# Patient Record
Sex: MALE | Race: WHITE | NOT HISPANIC OR LATINO | URBAN - METROPOLITAN AREA
[De-identification: names, ages, dates, MRNs, and addresses within clinical notes are randomized per-mention and may not be internally consistent; named-entity substitution may affect disease eponyms.]

---

## 2020-06-29 PROBLEM — Z00.00 ENCOUNTER FOR PREVENTIVE HEALTH EXAMINATION: Status: ACTIVE | Noted: 2020-06-29

## 2023-12-22 ENCOUNTER — INPATIENT (INPATIENT)
Facility: HOSPITAL | Age: 58
LOS: 0 days | Discharge: HOME | DRG: 72 | End: 2023-12-23
Attending: STUDENT IN AN ORGANIZED HEALTH CARE EDUCATION/TRAINING PROGRAM | Admitting: STUDENT IN AN ORGANIZED HEALTH CARE EDUCATION/TRAINING PROGRAM
Payer: COMMERCIAL

## 2023-12-22 VITALS
HEART RATE: 85 BPM | DIASTOLIC BLOOD PRESSURE: 87 MMHG | WEIGHT: 186.95 LBS | HEIGHT: 67 IN | OXYGEN SATURATION: 99 % | SYSTOLIC BLOOD PRESSURE: 130 MMHG | TEMPERATURE: 98 F | RESPIRATION RATE: 16 BRPM

## 2023-12-22 DIAGNOSIS — Z98.890 OTHER SPECIFIED POSTPROCEDURAL STATES: Chronic | ICD-10-CM

## 2023-12-22 DIAGNOSIS — G93.89 OTHER SPECIFIED DISORDERS OF BRAIN: ICD-10-CM

## 2023-12-22 LAB
APTT BLD: 28.2 SEC — SIGNIFICANT CHANGE UP (ref 24.5–35.6)
APTT BLD: 28.2 SEC — SIGNIFICANT CHANGE UP (ref 24.5–35.6)
HCT VFR BLD CALC: 46.8 % — SIGNIFICANT CHANGE UP (ref 39–50)
HCT VFR BLD CALC: 46.8 % — SIGNIFICANT CHANGE UP (ref 39–50)
HGB BLD-MCNC: 16.5 G/DL — SIGNIFICANT CHANGE UP (ref 13–17)
HGB BLD-MCNC: 16.5 G/DL — SIGNIFICANT CHANGE UP (ref 13–17)
INR BLD: 0.98 RATIO — SIGNIFICANT CHANGE UP (ref 0.85–1.18)
INR BLD: 0.98 RATIO — SIGNIFICANT CHANGE UP (ref 0.85–1.18)
MAGNESIUM SERPL-MCNC: 2 MG/DL — SIGNIFICANT CHANGE UP (ref 1.8–2.6)
MAGNESIUM SERPL-MCNC: 2 MG/DL — SIGNIFICANT CHANGE UP (ref 1.8–2.6)
MCHC RBC-ENTMCNC: 29.7 PG — SIGNIFICANT CHANGE UP (ref 27–34)
MCHC RBC-ENTMCNC: 29.7 PG — SIGNIFICANT CHANGE UP (ref 27–34)
MCHC RBC-ENTMCNC: 35.3 GM/DL — SIGNIFICANT CHANGE UP (ref 32–36)
MCHC RBC-ENTMCNC: 35.3 GM/DL — SIGNIFICANT CHANGE UP (ref 32–36)
MCV RBC AUTO: 84.3 FL — SIGNIFICANT CHANGE UP (ref 80–100)
MCV RBC AUTO: 84.3 FL — SIGNIFICANT CHANGE UP (ref 80–100)
PHOSPHATE SERPL-MCNC: 2.9 MG/DL — SIGNIFICANT CHANGE UP (ref 2.4–4.7)
PHOSPHATE SERPL-MCNC: 2.9 MG/DL — SIGNIFICANT CHANGE UP (ref 2.4–4.7)
PLATELET # BLD AUTO: 371 K/UL — SIGNIFICANT CHANGE UP (ref 150–400)
PLATELET # BLD AUTO: 371 K/UL — SIGNIFICANT CHANGE UP (ref 150–400)
PROTHROM AB SERPL-ACNC: 10.9 SEC — SIGNIFICANT CHANGE UP (ref 9.5–13)
PROTHROM AB SERPL-ACNC: 10.9 SEC — SIGNIFICANT CHANGE UP (ref 9.5–13)
RBC # BLD: 5.55 M/UL — SIGNIFICANT CHANGE UP (ref 4.2–5.8)
RBC # BLD: 5.55 M/UL — SIGNIFICANT CHANGE UP (ref 4.2–5.8)
RBC # FLD: 12.5 % — SIGNIFICANT CHANGE UP (ref 10.3–14.5)
RBC # FLD: 12.5 % — SIGNIFICANT CHANGE UP (ref 10.3–14.5)
WBC # BLD: 5.71 K/UL — SIGNIFICANT CHANGE UP (ref 3.8–10.5)
WBC # BLD: 5.71 K/UL — SIGNIFICANT CHANGE UP (ref 3.8–10.5)
WBC # FLD AUTO: 5.71 K/UL — SIGNIFICANT CHANGE UP (ref 3.8–10.5)
WBC # FLD AUTO: 5.71 K/UL — SIGNIFICANT CHANGE UP (ref 3.8–10.5)

## 2023-12-22 PROCEDURE — 99285 EMERGENCY DEPT VISIT HI MDM: CPT

## 2023-12-22 PROCEDURE — 93010 ELECTROCARDIOGRAM REPORT: CPT

## 2023-12-22 PROCEDURE — 74177 CT ABD & PELVIS W/CONTRAST: CPT | Mod: 26,MA

## 2023-12-22 PROCEDURE — 71260 CT THORAX DX C+: CPT | Mod: 26,MA

## 2023-12-22 PROCEDURE — 70553 MRI BRAIN STEM W/O & W/DYE: CPT | Mod: 26,MA

## 2023-12-22 RX ORDER — ACETAMINOPHEN 500 MG
650 TABLET ORAL EVERY 6 HOURS
Refills: 0 | Status: DISCONTINUED | OUTPATIENT
Start: 2023-12-22 | End: 2023-12-23

## 2023-12-22 RX ORDER — ENOXAPARIN SODIUM 100 MG/ML
40 INJECTION SUBCUTANEOUS EVERY 24 HOURS
Refills: 0 | Status: DISCONTINUED | OUTPATIENT
Start: 2023-12-22 | End: 2023-12-23

## 2023-12-22 RX ORDER — POLYETHYLENE GLYCOL 3350 17 G/17G
17 POWDER, FOR SOLUTION ORAL DAILY
Refills: 0 | Status: DISCONTINUED | OUTPATIENT
Start: 2023-12-22 | End: 2023-12-23

## 2023-12-22 RX ORDER — OXYCODONE HYDROCHLORIDE 5 MG/1
10 TABLET ORAL EVERY 4 HOURS
Refills: 0 | Status: DISCONTINUED | OUTPATIENT
Start: 2023-12-22 | End: 2023-12-23

## 2023-12-22 RX ORDER — ONDANSETRON 8 MG/1
4 TABLET, FILM COATED ORAL EVERY 6 HOURS
Refills: 0 | Status: DISCONTINUED | OUTPATIENT
Start: 2023-12-22 | End: 2023-12-23

## 2023-12-22 RX ORDER — LEVETIRACETAM 250 MG/1
500 TABLET, FILM COATED ORAL EVERY 12 HOURS
Refills: 0 | Status: DISCONTINUED | OUTPATIENT
Start: 2023-12-22 | End: 2023-12-23

## 2023-12-22 RX ORDER — INFLUENZA VIRUS VACCINE 15; 15; 15; 15 UG/.5ML; UG/.5ML; UG/.5ML; UG/.5ML
0.5 SUSPENSION INTRAMUSCULAR ONCE
Refills: 0 | Status: DISCONTINUED | OUTPATIENT
Start: 2023-12-22 | End: 2023-12-23

## 2023-12-22 RX ORDER — LABETALOL HCL 100 MG
10 TABLET ORAL
Refills: 0 | Status: DISCONTINUED | OUTPATIENT
Start: 2023-12-22 | End: 2023-12-23

## 2023-12-22 RX ORDER — HYDRALAZINE HCL 50 MG
10 TABLET ORAL
Refills: 0 | Status: DISCONTINUED | OUTPATIENT
Start: 2023-12-22 | End: 2023-12-23

## 2023-12-22 RX ORDER — OXYCODONE HYDROCHLORIDE 5 MG/1
5 TABLET ORAL EVERY 4 HOURS
Refills: 0 | Status: DISCONTINUED | OUTPATIENT
Start: 2023-12-22 | End: 2023-12-23

## 2023-12-22 RX ORDER — SENNA PLUS 8.6 MG/1
2 TABLET ORAL AT BEDTIME
Refills: 0 | Status: DISCONTINUED | OUTPATIENT
Start: 2023-12-22 | End: 2023-12-23

## 2023-12-22 RX ADMIN — ENOXAPARIN SODIUM 40 MILLIGRAM(S): 100 INJECTION SUBCUTANEOUS at 23:29

## 2023-12-22 RX ADMIN — SENNA PLUS 2 TABLET(S): 8.6 TABLET ORAL at 23:29

## 2023-12-22 NOTE — H&P ADULT - NSHPLABSRESULTS_GEN_ALL_CORE
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 Larger zone FLAIR signal involves basal ganglia, insula and   further sites of frontal and temporal lobes.    Likely diagnosis is high-grade glioma; correlate with surgical pathology.    ACC: [id number] EXAM: CT ANGIO BRAIN (W)AW IC  ACC: [id number] EXAM: CT ANGIO NECK (W)AW IC   ACC: [id number] EXAM: CT BRAIN O    PROCEDURE DATE: 12/22/2023    IMPRESSION: Right frontal lesion is suspect described above. Contrast enhanced MRI of brain is recommended for further evaluation.    CTA of the neck demonstrates no significant stenosis. CTA of the [place] demonstrates no evidence of aneurysms or significant stenosis.     CT Abdomen and Pelvis w/ IV Cont (12.22.23 @ 17:27)     IMPRESSION:  1.  No findings to suggest primary malignancy or metastatic disease in   the chest, abdomen or pelvis.    2.  Nonspecific 5 mm left lower lobe pulmonary nodule. Additional   calcified nodules in the left lower lobe lung likely postinfectious or   inflammatory.    3.  Diffuse bladder wall thickening and trabeculation. Findings could be   related to bladder outlet obstruction the setting of prostatomegaly or   cystitis. Correlate with urinalysis. 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 Larger zone FLAIR signal involves basal ganglia, insula and   further sites of frontal and temporal lobes.    Likely diagnosis is high-grade glioma; correlate with surgical pathology.    ACC: [id number] EXAM: CT ANGIO BRAIN (W)AW IC  ACC: [id number] EXAM: CT ANGIO NECK (W)AW IC   ACC: [id number] EXAM: CT BRAIN O    PROCEDURE DATE: 12/22/2023    IMPRESSION: Right frontal lesion is suspect described above. Contrast enhanced MRI of brain is recommended for further evaluation.    CTA of the neck demonstrates no significant stenosis. CTA of the [place] demonstrates no evidence of aneurysms or significant stenosis.     CT Abdomen and Pelvis w/ IV Cont (12.22.23 @ 17:27)     IMPRESSION:  1.  No findings to suggest primary malignancy or metastatic disease in   the chest, abdomen or pelvis.    2.  Nonspecific 5 mm left lower lobe pulmonary nodule. Additional   calcified nodules in the left lower lobe lung likely postinfectious or   inflammatory.    3.  Diffuse bladder wall thickening and trabeculation. Findings could be   related to bladder outlet obstruction the setting of prostatomegaly or   cystitis. Correlate with urinalysis.

## 2023-12-22 NOTE — ED PROVIDER NOTE - PROGRESS NOTE DETAILS
Cory: Patient signed out to me by previous team. Patient evaluated bedside and is comfortable w/o complaints at this time. Vitals stable. Pending MR read and neurosurgery recommendations. Keppra given.

## 2023-12-22 NOTE — ED ADULT TRIAGE NOTE - CHIEF COMPLAINT QUOTE
Pt BIBA transfer from [place] for evaluation of mass in the brain. Pt has been having intermittent and now worsening symptoms for the last 2 months. Pt with periods of confusion, slurred speech, vision changes and facial droop. Pt BIBA transfer from [place] for evaluation of mass in the brain. Pt has been having intermittent and now worsening symptoms for the last 2 months. Pt with periods of confusion, slurred speech, vision changes and facial droop.

## 2023-12-22 NOTE — CONSULT NOTE ADULT - ASSESSMENT
Assessment:  58M with PMH HTN, HLD, melanoma x3 who presented with 1 month of confusion, altered speaking, facial droop, and HA. Patient [place] [facility] [place], [place] showed R frontal lesion x2. Patient transferred to [facility] for further neurosurgical w/u and evaluation.      Plan:  - Discussed with Dr. Ventura  - Q2 hour neuro checks  - SBP goal normotensive  - Keppra 500 BID  - MRI brain w/wo stereotactic  - CT CAP for mets w/u   - No steroids  - Further plan pending imaging  Assessment:  58M with PMH HTN, HLD, melanoma x3 who presented with 1 month of confusion, altered speaking, facial droop, and HA. Patient presented to [facility] [place], CTH showed R frontal lesion x2. Patient transferred to [facility] for further neurosurgical w/u and evaluation.      Plan:  - Discussed with Dr. Ventura  - Q2 hour neuro checks  - SBP goal normotensive  - Keppra 500 BID  - MRI brain w/wo stereotactic  - CT CAP for mets w/u   - No steroids  - Further plan pending imaging

## 2023-12-22 NOTE — PATIENT PROFILE ADULT - FALL HARM RISK - UNIVERSAL INTERVENTIONS
Bed in lowest position, wheels locked, appropriate side rails in place/Call bell, personal items and telephone in reach/Instruct patient to call for assistance before getting out of bed or chair/Non-slip footwear when patient is out of bed/[place] to call system/Physically safe environment - no spills, clutter or unnecessary equipment/Purposeful Proactive Rounding/Room/bathroom lighting operational, light cord in reach Bed in lowest position, wheels locked, appropriate side rails in place/Call bell, personal items and telephone in reach/Instruct patient to call for assistance before getting out of bed or chair/Non-slip footwear when patient is out of bed/[place] to call system/Physically safe environment - no spills, clutter or unnecessary equipment/Purposeful Proactive Rounding/Room/bathroom lighting operational, light cord in reach

## 2023-12-22 NOTE — ED ADULT NURSE REASSESSMENT NOTE - NS ED NURSE REASSESS COMMENT FT1
Assumed care of patient from KANU Bejarano and [patient resting on stretcher quietly, no acute distress noted at this time, family at bedside and pt pending MRI results.

## 2023-12-22 NOTE — CONSULT NOTE ADULT - SUBJECTIVE AND OBJECTIVE BOX
HPI:  58M with PMH HTN, HLD, melanoma x3 last resection 1 year ago never requiring systemic treatment who presented today with being altered, L facial droop, and HA. Per patient, his children have noticed him being "off" for the past 1 month with intermittent confusion and altered speaking while talking with him on the phone. Yesterday, patient flew from [place] to visit family, and his children confirmed altered speaking and facial droop which prompted patient to visit [facility] this am. Patient admits to severe HA 10/10 currently, has tried using extra strength tylenol without relief, and nausea this am now resolved. Patient also admits to worsening vision / blurred vision x1 month. Patient denies weakness, numbness, tingling, difficulty walking, weight loss, CP, SOB, N/V.       PAST MEDICAL & SURGICAL HISTORY:  HTN  HLD  Melanoma x3 last resected 1 year ago, never required systemic treatment      SOCIAL HISTORY:  Tobacco Use: denies  EtOH use: former etoh abuse, quit 1 year ago  Substance: denies       Allergies  No Known Allergies      REVIEW OF SYSTEMS  Negative except as noted in HPI  CONSTITUTIONAL: No fever, weight loss, or fatigue  EYES: + blurred vision. No eye pain, visual disturbances, or discharge  ENMT:  No difficulty hearing, tinnitus, vertigo; No sinus or throat pain  RESPIRATORY: No cough; No shortness of breath  CARDIOVASCULAR: No chest pain  GASTROINTESTINAL: + nausea intermittent, No abdominal pain  NEUROLOGICAL: + headache, No memory loss, loss of strength, numbness, or tremors  MUSCULOSKELETAL: No joint pain or swelling; No muscle, back, or extremity pain      Home medications:  HCTZ   Statin       Vital Signs Last 24 Hrs  T(C): 36.7 (22 Dec 2023 13:36), Max: 36.7 (22 Dec 2023 13:36)  T(F): 98.1 (22 Dec 2023 13:36), Max: 98.1 (22 Dec 2023 13:36)  HR: 85 (22 Dec 2023 13:36) (85 - 85)  BP: 130/87 (22 Dec 2023 13:36) (130/87 - 130/87)  RR: 16 (22 Dec 2023 13:36) (16 - 16)  SpO2: 99% (22 Dec 2023 13:36) (99% - 99%)    Parameters below as of 22 Dec 2023 13:36  Patient On (Oxygen Delivery Method): room air      Physical Exam:  Constitutional: NAD, lying in bed  Neuro  * Mental Status:  GCS 15: Awake, alert, oriented to conversation. No aphasia or difficulty speaking. Mild dysarthria.  * Cranial Nerves: PERRL, EOMI, tongue midline, no gaze deviation, L facial droop   * Motor: RUE 5/5, LUE 5/5, RLE 5/5, LLE 5/5, no drift or dysmetria  * Sensory: Sensation intact to light touch  * Reflexes: not assessed       LABS:  Pending       RADIOLOGY & ADDITIONAL STUDIES:  12/22/23 CTH:   There is evidence of an abnormal lesion with some peripheral hyperattenuation identified in the right frontal region. This lesion does demonstrate central area of low attenuation and measures approximately 4.6 x 4.2 cm. This is likely compatible with underlying neoplastic process such as underlying glioma though the possibility of underlying metastasis or other similar etiologies cannot entirely excluded. Just adjacent to this area is a second low attenuation seen involving the lateral aspect of the right lenticular nucleus region. This area of low attenuation measures approximately 3.3 x 1.6 cm and could be compatible with a second lesion. Contrast enhanced MRI is recommended to better characterize both these findings.    Mass effect on the right lateral ventricle seen. Right to left shift (7.1 mm).    Evaluation of the osseous appropriate window appears unremarkable    The visualized paranasal sinuses mastoid and middle ear regions appear clear. Both distal common carotid, proximal internal and external carotid arteries appear normal as well as both vertebral arteries. Dominant left vertebral artery seen.    Both distal internal carotid arteries appear normal    Both anterior cerebral arteries appear slightly deviated to the left side though normal flow is seen. Both middle cerebral basilar and posterior cerebral arteries appear normal. The dural venous sinuses demonstrate normal enhancement. Evaluation soft tissue neck region appears normal    The visualized portion both lung apices appear clear.    Degenerative changes involving the cervical spine.    IMPRESSION: Right frontal lesion is suspect described above. Contrast enhanced MRI of brain is recommended for further evaluation.    CTA of the neck demonstrates no significant stenosis. CTA of the [place] of [place] demonstrates no evidence of aneurysms or significant stenosis.    --- End of Report ---  RADHA GARCIA MD; Attending Radiologist  This document has been electronically signed. Dec 22 2023 10:02AM HPI:  58M with PMH HTN, HLD, melanoma x3 last resection 1 year ago never requiring systemic treatment who presented today with being altered, L facial droop, and HA. Per patient, his children have noticed him being "off" for the past 1 month with intermittent confusion and altered speaking while talking with him on the phone. Yesterday, patient flew from [place] to visit family, and his children confirmed altered speaking and facial droop which prompted patient to visit [facility] this am. Patient admits to severe HA 10/10 currently, has tried using extra strength tylenol without relief, and nausea this am now resolved. Patient also admits to worsening vision / blurred vision x1 month. Patient denies weakness, numbness, tingling, difficulty walking, weight loss, CP, SOB, N/V.       PAST MEDICAL & SURGICAL HISTORY:  HTN  HLD  Melanoma x3 last resected 1 year ago, never required systemic treatment      SOCIAL HISTORY:  Tobacco Use: denies  EtOH use: former etoh abuse, quit 1 year ago  Substance: denies       Allergies  No Known Allergies      REVIEW OF SYSTEMS  Negative except as noted in HPI  CONSTITUTIONAL: No fever, weight loss, or fatigue  EYES: + blurred vision. No eye pain, visual disturbances, or discharge  ENMT:  No difficulty hearing, tinnitus, vertigo; No sinus or throat pain  RESPIRATORY: No cough; No shortness of breath  CARDIOVASCULAR: No chest pain  GASTROINTESTINAL: + nausea intermittent, No abdominal pain  NEUROLOGICAL: + headache, No memory loss, loss of strength, numbness, or tremors  MUSCULOSKELETAL: No joint pain or swelling; No muscle, back, or extremity pain      Home medications:  HCTZ   Statin       Vital Signs Last 24 Hrs  T(C): 36.7 (22 Dec 2023 13:36), Max: 36.7 (22 Dec 2023 13:36)  T(F): 98.1 (22 Dec 2023 13:36), Max: 98.1 (22 Dec 2023 13:36)  HR: 85 (22 Dec 2023 13:36) (85 - 85)  BP: 130/87 (22 Dec 2023 13:36) (130/87 - 130/87)  RR: 16 (22 Dec 2023 13:36) (16 - 16)  SpO2: 99% (22 Dec 2023 13:36) (99% - 99%)    Parameters below as of 22 Dec 2023 13:36  Patient On (Oxygen Delivery Method): room air      Physical Exam:  Constitutional: NAD, lying in bed  Neuro  * Mental Status:  GCS 15: Awake, alert, oriented to conversation. No aphasia or difficulty speaking. Mild dysarthria.  * Cranial Nerves: PERRL, EOMI, tongue midline, no gaze deviation, L facial droop   * Motor: RUE 5/5, LUE 5/5, RLE 5/5, LLE 5/5, no drift or dysmetria  * Sensory: Sensation intact to light touch  * Reflexes: not assessed       LABS:  Pending       RADIOLOGY & ADDITIONAL STUDIES:  12/22/23 CTH:   There is evidence of an abnormal lesion with some peripheral hyperattenuation identified in the right frontal region. This lesion does demonstrate central area of low attenuation and measures approximately 4.6 x 4.2 cm. This is likely compatible with underlying neoplastic process such as underlying glioma though the possibility of underlying metastasis or other similar etiologies cannot entirely excluded. Just adjacent to this area is a second low attenuation seen involving the lateral aspect of the right lenticular nucleus region. This area of low attenuation measures approximately 3.3 x 1.6 cm and could be compatible with a second lesion. Contrast enhanced MRI is recommended to better characterize both these findings.    Mass effect on the right lateral ventricle seen. Right to left shift (7.1 mm).    Evaluation of the osseous appropriate window appears unremarkable    The visualized paranasal sinuses mastoid and middle ear regions appear clear. Both distal common carotid, proximal internal and external carotid arteries appear normal as well as both vertebral arteries. Dominant left vertebral artery seen.    Both distal internal carotid arteries appear normal    Both anterior cerebral arteries appear slightly deviated to the left side though normal flow is seen. Both middle cerebral basilar and posterior cerebral arteries appear normal. The dural venous sinuses demonstrate normal enhancement. Evaluation soft tissue neck region appears normal    The visualized portion both lung apices appear clear.    Degenerative changes involving the cervical spine.    IMPRESSION: Right frontal lesion is suspect described above. Contrast enhanced MRI of brain is recommended for further evaluation.    CTA of the neck demonstrates no significant stenosis. CTA of the [place] demonstrates no evidence of aneurysms or significant stenosis.    --- End of Report ---  RADHA GARCIA MD; Attending Radiologist  This document has been electronically signed. Dec 22 2023 10:02AM

## 2023-12-22 NOTE — ED PROVIDER NOTE - OBJECTIVE STATEMENT
Pt is a 59 yo M transferred [place] [facility] [place].   Pt has had 2 wk hx of L facial droop, frontal headaches and forgetfulness.  Pt is in town visiting family for the holidays and was taken to [facility] [place] and found to have a brain mass and was brought here for further evaluation. no n/v. no cp. no sob. no other complaints. Pt is a 59 yo M transferred from [facility] [place] for brain mass.   Pt has had 2 wk hx of L facial droop, frontal headaches and forgetfulness.  Pt is in town visiting family for the holidays and was taken to [facility] [place] and found to have a brain mass and was brought here for further evaluation. no n/v. no cp. no sob. no other complaints.

## 2023-12-22 NOTE — ED ADULT NURSE NOTE - NSFALLUNIVINTERV_ED_ALL_ED
Bed/Stretcher in lowest position, wheels locked, appropriate side rails in place/Call bell, personal items and telephone in reach/Instruct patient to call for assistance before getting out of bed/chair/stretcher/Non-slip footwear applied when patient is off stretcher/[place] to call system/Physically safe environment - no spills, clutter or unnecessary equipment/Purposeful proactive rounding/Room/bathroom lighting operational, light cord in reach Bed/Stretcher in lowest position, wheels locked, appropriate side rails in place/Call bell, personal items and telephone in reach/Instruct patient to call for assistance before getting out of bed/chair/stretcher/Non-slip footwear applied when patient is off stretcher/[place] to call system/Physically safe environment - no spills, clutter or unnecessary equipment/Purposeful proactive rounding/Room/bathroom lighting operational, light cord in reach

## 2023-12-22 NOTE — H&P ADULT - HISTORY OF PRESENT ILLNESS
58M with PMHx of HTN, HLD, melanoma x3 last resection 1 year ago never requiring systemic treatment who presented today after being altered, L facial droop, and HA. Per patient, his children have noticed him being "off" for the past 1 month with intermittent confusion and altered speech while talking with him on the phone. Yesterday, patient flew from [place] to visit family, and his children confirmed altered speech and facial droop which prompted [place] [facility]. Patient admits to severe HA 10/10 currently, has tried using extra strength tylenol without relief, and nausea this am now resolved. Patient also admits to worsening vision / blurred vision x1 month. Patient denies weakness, numbness, tingling, difficulty walking, weight loss, CP, SOB, N/V. [place] [facility] [place] 4.6x4.2 cm R frontal mass w/ additional  3.3x1.6cm adjacent lesion. Pt was transferred to [facility] for further neurosurgical evaluation.    58M with PMHx of HTN, HLD, melanoma x3 last resection 1 year ago never requiring systemic treatment who presented today after being altered, L facial droop, and HA. Per patient, his children have noticed him being "off" for the past 1 month with intermittent confusion and altered speech while talking with him on the phone. Yesterday, patient flew from [place] to visit family, and his children confirmed altered speech and facial droop which prompted patient to visit [facility] [place] [place] this am. Patient admits to severe HA 10/10 currently, has tried using extra strength tylenol without relief, and nausea this am now resolved. Patient also admits to worsening vision / blurred vision x1 month. Patient denies weakness, numbness, tingling, difficulty walking, weight loss, CP, SOB, N/V. CTH at [facility] [place] [place] showed 4.6x4.2 cm R frontal mass w/ additional  3.3x1.6cm adjacent lesion. Pt was transferred to [facility] for further neurosurgical evaluation.

## 2023-12-22 NOTE — ED PROVIDER NOTE - CLINICAL SUMMARY MEDICAL DECISION MAKING FREE TEXT BOX
[place] [facility] [place] C/A/P neg for malignancy/metastatic disease.  Pt pending MRI and disposition as per neurousrgery. pt signed out to dr. galan. labs and [place] [facility] [place]  [place] C/A/P neg for malignancy/metastatic disease.  Pt pending MRI and disposition as per neurousrgery. pt signed out to dr. galan.

## 2023-12-22 NOTE — ED ADULT NURSE NOTE - CHIEF COMPLAINT QUOTE
Pt BIBA transfer from [place] for evaluation of mass in the brain. Pt has been having intermittent and now worsening symptoms for the last 2 months. Pt with periods of confusion, slurred speech, vision changes and facial droop. Pt BIBA transfer from [place] for evaluation of mass in the brain. Pt has been having intermittent and now worsening symptoms for the last 2 months. Pt with periods of confusion, slurred speech, vision changes and facial droop.

## 2023-12-22 NOTE — H&P ADULT - ASSESSMENT
ASSESSMENT:  58M with PMHx of  HTN, HLD, melanoma x3 who presented with 1 month of confusion, altered speech, facial droop, and HA. [place] [facility] [place] showed  4.6x4.2 cm R frontal mass w/ additional  3.3x1.6cm adjacent lesion. Patient transferred to [facility] for further neurosurgical w/u and evaluation.    PLAN:    -Q1h neuro checks  -MRI brain w/wo w/ brainlab protocol completed  -CT C/A/P completed  -STAT CTH for any changes in neuro exam  -Pain control prn, avoid oversedation  --160  -Keppra 500mg BID  -Baseline labs ordered  -Screening LED ordered  -No steroids for now   -Regular diet  -Bowel regimen: senna, miralax  -VTE prophylaxis: SCDs, hold chemoprophylaxis for now  -Activity: OOB as tolerated   -Further medical management per NSICU  -Discussed case with Dr. Ventura ASSESSMENT:  58M with PMHx of  HTN, HLD, melanoma x3 who presented with 1 month of confusion, altered speech, facial droop, and HA. [place] [facility] [place]  4.6x4.2 cm R frontal mass w/ additional  3.3x1.6cm adjacent lesion. Patient transferred to [facility] for further neurosurgical w/u and evaluation.    PLAN:    -Q1h neuro checks  -MRI brain w/wo w/ brainlab protocol completed  -CT C/A/P completed  -STAT CTH for any changes in neuro exam  -Pain control prn, avoid oversedation  --160  -Keppra 500mg BID  -Baseline labs ordered  -Screening LED ordered  -No steroids for now   -Regular diet  -Bowel regimen: senna, miralax  -VTE prophylaxis: SCDs, hold chemoprophylaxis for now  -Activity: OOB as tolerated   -Further medical management per NSICU  -Discussed case with Dr. Ventura ASSESSMENT:  58M with PMHx of  HTN, HLD, melanoma x3 who presented with 1 month of confusion, altered speech, facial droop, and HA. [place] [facility] [place] showed  4.6x4.2 cm R frontal mass w/ additional  3.3x1.6cm adjacent lesion. Patient transferred to [facility] for further neurosurgical w/u and evaluation.    PLAN:    -Q1h neuro checks  -MRI brain w/wo w/ brainlab protocol completed  -CT C/A/P completed  -STAT CTH for any changes in neuro exam  -Pain control prn, avoid oversedation  --160  -Keppra 500mg BID  -Baseline labs ordered  -Screening LED ordered  -No steroids for now   -Regular diet  -Bowel regimen: senna, miralax  -VTE prophylaxis: SCDs, lovenox  -Activity: OOB as tolerated   -Further medical management per NSICU  -Discussed case with Dr. Ventura ASSESSMENT:  58M with PMHx of  HTN, HLD, melanoma x3 who presented with 1 month of confusion, altered speech, facial droop, and HA. Patient presented to [facility] [place] showed  4.6x4.2 cm R frontal mass w/ additional  3.3x1.6cm adjacent lesion. Patient transferred to [facility] for further neurosurgical w/u and evaluation.    PLAN:    -Q1h neuro checks  -MRI brain w/wo w/ brainlab protocol completed  -CT C/A/P completed  -STAT CTH for any changes in neuro exam  -Pain control prn, avoid oversedation  --160  -Keppra 500mg BID  -Baseline labs ordered  -Screening LED ordered  -No steroids for now   -Regular diet  -Bowel regimen: senna, miralax  -VTE prophylaxis: SCDs, lovenox  -Activity: OOB as tolerated   -Further medical management per NSICU  -Discussed case with Dr. Ventura

## 2023-12-22 NOTE — H&P ADULT - NSHPPHYSICALEXAM_GEN_ALL_CORE
CONSTITUTIONAL: Well groomed, no apparent distress   EYES: PERRLA and symmetric, EOMI, No conjunctival or scleral injection, non-icteric   ENMT: Oral mucosa with moist membranes. Normal dentition   NECK: Supple, symmetric and without tracheal deviation    RESP: No respiratory distress   CV:No JVD; no peripheral edema   NEURO:   - Mental Status:  Alert, awake, oriented to person, place, and time; Speech is fluent though very mild dysarthria. Follows commands well.  Insight and knowledge appear appropriate.    -Cranial Nerves II-XII:      II:  Visual acuity is normal for age ; Visual fields are full to confrontation; Pupils are equal, round, and reactive to light.    III, IV, VI:  Extraocular movements are intact without nystagmus.    V:  Facial sensation is intact in the V1-V3 distribution bilaterally.    VII:  Face is symmetric with normal eye closure and smile    VIII:  Hearing is grossly intact    IX, X, XII:  speech is clear    XI:  Head turning and shoulder shrug are intact.    - Motor:  Strength is 5/5 in b/l UEs and LEs.  There is no pronator drift.    - Sensory:  Symmetric to light touch    - Coordination:  Dexterity appears normal    SKIN: No rashes or ulcers noted   PSYCH: Appropriate insight/judgment; A+O x 3, mood and affect appropriate

## 2023-12-22 NOTE — CONSULT NOTE ADULT - SUBJECTIVE AND OBJECTIVE BOX
Patient is a 58y old  Male who presents with a chief complaint of R frontal brain tumor (22 Dec 2023 21:06)    HPI:  58M with PMHx of HTN, HLD, melanoma x3 last resection 1 year ago never requiring systemic treatment who presented today after being altered, L facial droop, and HA. Per patient, his children have noticed him being "off" for the past 1 month with intermittent confusion and altered speech while talking with him on the phone. Yesterday, patient flew from [place] to visit family, and his children confirmed altered speech and facial droop which prompted patient to visit [facility] [place] this am. Patient admits to severe HA 10/10 currently, has tried using extra strength tylenol without relief, and nausea this am now resolved. Patient also admits to worsening vision / blurred vision x1 month. Patient denies weakness, numbness, tingling, difficulty walking, weight loss, CP, SOB, N/V. CTH at [facility] [place] showed 4.6x4.2 cm R frontal mass w/ additional  3.3x1.6cm adjacent lesion. Pt was transferred to [facility] for further neurosurgical evaluation. Pt in NSICU for further monitoring.       PAST MEDICAL & SURGICAL HISTORY:  Melanoma      Hypertension      HLD (hyperlipidemia)      S/P rotator cuff repair        FAMILY HISTORY:  No pertinent family history in first degree relatives        SOCIAL HISTORY:  Tobacco Use:  EtOH use:   Substance:    Allergies    No Known Allergies    Intolerances        REVIEW OF SYSTEMS  Negative except as noted in HPI  CONSTITUTIONAL: No fever, weight loss, or fatigue  EYES: + blurred vision. No eye pain, visual disturbances, or discharge  ENMT:  No difficulty hearing, tinnitus, vertigo; No sinus or throat pain  RESPIRATORY: No cough; No shortness of breath  CARDIOVASCULAR: No chest pain  GASTROINTESTINAL: + nausea intermittent, No abdominal pain  NEUROLOGICAL: + headache, No memory loss, loss of strength, numbness, or tremors  MUSCULOSKELETAL: No joint pain or swelling; No muscle, back, or extremity pain    HOME MEDICATIONS:  Home Medications:  hydroCHLOROthiazide 25 mg oral tablet: 1 tab(s) orally once a day (22 Dec 2023 21:11)  losartan 25 mg oral tablet: 1 tab(s) orally once a day (22 Dec 2023 21:12)      MEDICATIONS:  Antibiotics:    Neuro:  acetaminophen     Tablet .. 650 milliGRAM(s) Oral every 6 hours PRN  levETIRAcetam  IVPB 500 milliGRAM(s) IV Intermittent every 12 hours  ondansetron Injectable 4 milliGRAM(s) IV Push every 6 hours PRN  oxyCODONE    IR 5 milliGRAM(s) Oral every 4 hours PRN  oxyCODONE    IR 10 milliGRAM(s) Oral every 4 hours PRN    Anticoagulation:  enoxaparin Injectable 40 milliGRAM(s) SubCutaneous every 24 hours    OTHER:  hydrALAZINE Injectable 10 milliGRAM(s) IV Push every 2 hours PRN  influenza   Vaccine 0.5 milliLiter(s) IntraMuscular once  labetalol Injectable 10 milliGRAM(s) IV Push every 2 hours PRN  polyethylene glycol 3350 17 Gram(s) Oral daily  senna 2 Tablet(s) Oral at bedtime    IVF:  multivitamin 1 Tablet(s) Oral daily      Vital Signs Last 24 Hrs  T(C): 36.4 (22 Dec 2023 22:09), Max: 36.8 (22 Dec 2023 15:12)  T(F): 97.5 (22 Dec 2023 22:09), Max: 98.3 (22 Dec 2023 18:54)  HR: 80 (22 Dec 2023 23:00) (80 - 92)  BP: 148/85 (22 Dec 2023 23:00) (130/87 - 148/85)  BP(mean): 99 (22 Dec 2023 23:00) (99 - 107)  RR: 28 (22 Dec 2023 23:00) (16 - 28)  SpO2: 93% (22 Dec 2023 23:00) (93% - 99%)    Parameters below as of 22 Dec 2023 21:38  Patient On (Oxygen Delivery Method): room air          PHYSICAL EXAM:  GENERAL: NAD, well-groomed  HEAD:  Atraumatic, normocephalic  EYES: Conjunctiva and sclera clear  NECK: Supple, nontender to palpation  Constitutional: NAD, lying in bed  Neuro  * Mental Status:  GCS 15: Awake, alert, oriented to conversation. No aphasia or difficulty speaking. Mild dysarthria.  * Cranial Nerves: PERRL, EOMI, tongue midline, no gaze deviation, L facial droop   * Motor: RUE 5/5, LUE 5/5, RLE 5/5, LLE 5/5, no drift or dysmetria  * Sensory: Sensation intact to light touch  * Reflexes: not assessed   MUSCLE STRETCH REFLEXES: DTRs 2+ intact and symmetric  CHEST/LUNG: Chest rise and fall equally and bilaterally   HEART: +S1/+S2   ABDOMEN: Soft, nontender, nondistended  EXTREMITIES:  2+ peripheral pulses, no clubbing, cyanosis, or edema  SKIN: Warm, dry; no rashes or lesions    LABS:                        16.5   5.71  )-----------( 371      ( 22 Dec 2023 21:09 )             46.8       Phos  2.9     12-22  Mg     2.0     12-22      PT/INR - ( 22 Dec 2023 21:09 )   PT: 10.9 sec;   INR: 0.98 ratio         PTT - ( 22 Dec 2023 21:09 )  PTT:28.2 sec      CULTURES:      RADIOLOGY & ADDITIONAL STUDIES: Patient is a 58y old  Male who presents with a chief complaint of R frontal brain tumor (22 Dec 2023 21:06)    HPI:  58M with PMHx of HTN, HLD, melanoma x3 last resection 1 year ago never requiring systemic treatment who presented today after being altered, L facial droop, and HA. Per patient, his children have noticed him being "off" for the past 1 month with intermittent confusion and altered speech while talking with him on the phone. Yesterday, patient flew from [place] to visit family, and his children confirmed altered speech and facial droop which prompted patient to visit [facility] this [place]. Patient admits to severe HA 10/10 currently, has tried using extra strength tylenol without relief, and nausea this am now resolved. Patient also admits to worsening vision / blurred vision x1 month. Patient denies weakness, numbness, tingling, difficulty walking, weight loss, CP, SOB, N/V. [place] at [facility] showed 4.6x4.2 cm R frontal mass w/ additional  3.3x1.6cm adjacent lesion. Pt was transferred to [facility] for further neurosurgical evaluation. Pt in NSICU for further monitoring.       PAST MEDICAL & SURGICAL HISTORY:  Melanoma      Hypertension      HLD (hyperlipidemia)      S/P rotator cuff repair        FAMILY HISTORY:  No pertinent family history in first degree relatives        SOCIAL HISTORY:  Tobacco Use:  EtOH use:   Substance:    Allergies    No Known Allergies    Intolerances        REVIEW OF SYSTEMS  Negative except as noted in HPI  CONSTITUTIONAL: No fever, weight loss, or fatigue  EYES: + blurred vision. No eye pain, visual disturbances, or discharge  ENMT:  No difficulty hearing, tinnitus, vertigo; No sinus or throat pain  RESPIRATORY: No cough; No shortness of breath  CARDIOVASCULAR: No chest pain  GASTROINTESTINAL: + nausea intermittent, No abdominal pain  NEUROLOGICAL: + headache, No memory loss, loss of strength, numbness, or tremors  MUSCULOSKELETAL: No joint pain or swelling; No muscle, back, or extremity pain    HOME MEDICATIONS:  Home Medications:  hydroCHLOROthiazide 25 mg oral tablet: 1 tab(s) orally once a day (22 Dec 2023 21:11)  losartan 25 mg oral tablet: 1 tab(s) orally once a day (22 Dec 2023 21:12)      MEDICATIONS:  Antibiotics:    Neuro:  acetaminophen     Tablet .. 650 milliGRAM(s) Oral every 6 hours PRN  levETIRAcetam  IVPB 500 milliGRAM(s) IV Intermittent every 12 hours  ondansetron Injectable 4 milliGRAM(s) IV Push every 6 hours PRN  oxyCODONE    IR 5 milliGRAM(s) Oral every 4 hours PRN  oxyCODONE    IR 10 milliGRAM(s) Oral every 4 hours PRN    Anticoagulation:  enoxaparin Injectable 40 milliGRAM(s) SubCutaneous every 24 hours    OTHER:  hydrALAZINE Injectable 10 milliGRAM(s) IV Push every 2 hours PRN  influenza   Vaccine 0.5 milliLiter(s) IntraMuscular once  labetalol Injectable 10 milliGRAM(s) IV Push every 2 hours PRN  polyethylene glycol 3350 17 Gram(s) Oral daily  senna 2 Tablet(s) Oral at bedtime    IVF:  multivitamin 1 Tablet(s) Oral daily      Vital Signs Last 24 Hrs  T(C): 36.4 (22 Dec 2023 22:09), Max: 36.8 (22 Dec 2023 15:12)  T(F): 97.5 (22 Dec 2023 22:09), Max: 98.3 (22 Dec 2023 18:54)  HR: 80 (22 Dec 2023 23:00) (80 - 92)  BP: 148/85 (22 Dec 2023 23:00) (130/87 - 148/85)  BP(mean): 99 (22 Dec 2023 23:00) (99 - 107)  RR: 28 (22 Dec 2023 23:00) (16 - 28)  SpO2: 93% (22 Dec 2023 23:00) (93% - 99%)    Parameters below as of 22 Dec 2023 21:38  Patient On (Oxygen Delivery Method): room air          PHYSICAL EXAM:  GENERAL: NAD, well-groomed  HEAD:  Atraumatic, normocephalic  EYES: Conjunctiva and sclera clear  NECK: Supple, nontender to palpation  Constitutional: NAD, lying in bed  Neuro  * Mental Status:  GCS 15: Awake, alert, oriented to conversation. No aphasia or difficulty speaking. Mild dysarthria.  * Cranial Nerves: PERRL, EOMI, tongue midline, no gaze deviation, L facial droop   * Motor: RUE 5/5, LUE 5/5, RLE 5/5, LLE 5/5, no drift or dysmetria  * Sensory: Sensation intact to light touch  * Reflexes: not assessed   MUSCLE STRETCH REFLEXES: DTRs 2+ intact and symmetric  CHEST/LUNG: Chest rise and fall equally and bilaterally   HEART: +S1/+S2   ABDOMEN: Soft, nontender, nondistended  EXTREMITIES:  2+ peripheral pulses, no clubbing, cyanosis, or edema  SKIN: Warm, dry; no rashes or lesions    LABS:                        16.5   5.71  )-----------( 371      ( 22 Dec 2023 21:09 )             46.8       Phos  2.9     12-22  Mg     2.0     12-22      PT/INR - ( 22 Dec 2023 21:09 )   PT: 10.9 sec;   INR: 0.98 ratio         PTT - ( 22 Dec 2023 21:09 )  PTT:28.2 sec      CULTURES:      RADIOLOGY & ADDITIONAL STUDIES:

## 2023-12-22 NOTE — CONSULT NOTE ADULT - ASSESSMENT
Assessment:  58M with PMH HTN, HLD, melanoma x3 who presented with 1 month of confusion, altered speaking, facial droop, and HA. Patient presented to [facility] [place], CTH showed R frontal lesion x2. Patient transferred to [facility] for further neurosurgical w/u and evaluation.   In NSICU for further monitoring.       Plan:    Neuro:   - Q2 neuro checks   - Oxy IR and tylenol for pain   - Keppra   - MRI brain w/wo stereotactic  - CT CAP for mets w/u   - No steroids    CV:   - SBP goal normotensive   - Hydralazine and labetolol PRN     Respiratory:   - Satting well on RA   - continue to monitor    GI:  - Dash diet   - no issues   - Last BM outpatient     :   - voiding   - I/O's q1     Heme:   - No issues   - Continue to monitor H/H     ID:   - No issues   - continue to monitor fever curve and WBC count     Endo:   - No issues   - monitor glucose        Assessment:  58M with PMH HTN, HLD, melanoma x3 who presented with 1 month of confusion, altered speaking, facial droop, and HA. Patient presented to [facility], CTH showed R frontal lesion x2. Patient transferred to [facility] for further neurosurgical w/u and evaluation.   In NSICU for further monitoring.       Plan:    Neuro:   - Q2 neuro checks   - Oxy IR and tylenol for pain   - Keppra   - MRI brain w/wo stereotactic  - CT CAP for mets w/u   - No steroids    CV:   - SBP goal normotensive   - Hydralazine and labetolol PRN     Respiratory:   - Satting well on RA   - continue to monitor    GI:  - Dash diet   - no issues   - Last BM outpatient     :   - voiding   - I/O's q1     Heme:   - No issues   - Continue to monitor H/H     ID:   - No issues   - continue to monitor fever curve and WBC count     Endo:   - No issues   - monitor glucose

## 2023-12-22 NOTE — ED PROVIDER NOTE - PHYSICAL EXAMINATION
Constitutional - well-developed.   Head - NCAT. Airway patent.   Eyes - PERRL.   CV - RRR. no murmur. no edema.   Pulm - CTAB.   Abd - soft, nt. no rebound. no guarding.   Neuro - A&Ox3. strength 5/5 x4. sensation intact x4. normal gait. L facial droop  Skin - No rash. .  MSK - normal ROM.

## 2023-12-23 ENCOUNTER — TRANSCRIPTION ENCOUNTER (OUTPATIENT)
Age: 58
End: 2023-12-23

## 2023-12-23 VITALS — TEMPERATURE: 98 F

## 2023-12-23 LAB
A1C WITH ESTIMATED AVERAGE GLUCOSE RESULT: 5.8 % — HIGH (ref 4–5.6)
A1C WITH ESTIMATED AVERAGE GLUCOSE RESULT: 5.8 % — HIGH (ref 4–5.6)
ALBUMIN SERPL ELPH-MCNC: 4 G/DL — SIGNIFICANT CHANGE UP (ref 3.3–5.2)
ALBUMIN SERPL ELPH-MCNC: 4 G/DL — SIGNIFICANT CHANGE UP (ref 3.3–5.2)
ALP SERPL-CCNC: 77 U/L — SIGNIFICANT CHANGE UP (ref 40–120)
ALP SERPL-CCNC: 77 U/L — SIGNIFICANT CHANGE UP (ref 40–120)
ALT FLD-CCNC: 26 U/L — SIGNIFICANT CHANGE UP
ALT FLD-CCNC: 26 U/L — SIGNIFICANT CHANGE UP
ANION GAP SERPL CALC-SCNC: 10 MMOL/L — SIGNIFICANT CHANGE UP (ref 5–17)
ANION GAP SERPL CALC-SCNC: 10 MMOL/L — SIGNIFICANT CHANGE UP (ref 5–17)
APPEARANCE UR: CLEAR — SIGNIFICANT CHANGE UP
APPEARANCE UR: CLEAR — SIGNIFICANT CHANGE UP
AST SERPL-CCNC: 20 U/L — SIGNIFICANT CHANGE UP
AST SERPL-CCNC: 20 U/L — SIGNIFICANT CHANGE UP
BILIRUB SERPL-MCNC: 0.4 MG/DL — SIGNIFICANT CHANGE UP (ref 0.4–2)
BILIRUB SERPL-MCNC: 0.4 MG/DL — SIGNIFICANT CHANGE UP (ref 0.4–2)
BILIRUB UR-MCNC: NEGATIVE — SIGNIFICANT CHANGE UP
BILIRUB UR-MCNC: NEGATIVE — SIGNIFICANT CHANGE UP
BUN SERPL-MCNC: 15.1 MG/DL — SIGNIFICANT CHANGE UP (ref 8–20)
BUN SERPL-MCNC: 15.1 MG/DL — SIGNIFICANT CHANGE UP (ref 8–20)
CALCIUM SERPL-MCNC: 8.3 MG/DL — LOW (ref 8.4–10.5)
CALCIUM SERPL-MCNC: 8.3 MG/DL — LOW (ref 8.4–10.5)
CHLORIDE SERPL-SCNC: 108 MMOL/L — SIGNIFICANT CHANGE UP (ref 96–108)
CHLORIDE SERPL-SCNC: 108 MMOL/L — SIGNIFICANT CHANGE UP (ref 96–108)
CO2 SERPL-SCNC: 23 MMOL/L — SIGNIFICANT CHANGE UP (ref 22–29)
CO2 SERPL-SCNC: 23 MMOL/L — SIGNIFICANT CHANGE UP (ref 22–29)
COLOR SPEC: YELLOW — SIGNIFICANT CHANGE UP
COLOR SPEC: YELLOW — SIGNIFICANT CHANGE UP
CREAT SERPL-MCNC: 0.77 MG/DL — SIGNIFICANT CHANGE UP (ref 0.5–1.3)
CREAT SERPL-MCNC: 0.77 MG/DL — SIGNIFICANT CHANGE UP (ref 0.5–1.3)
DIFF PNL FLD: NEGATIVE — SIGNIFICANT CHANGE UP
DIFF PNL FLD: NEGATIVE — SIGNIFICANT CHANGE UP
EGFR: 103 ML/MIN/1.73M2 — SIGNIFICANT CHANGE UP
EGFR: 103 ML/MIN/1.73M2 — SIGNIFICANT CHANGE UP
ESTIMATED AVERAGE GLUCOSE: 120 MG/DL — HIGH (ref 68–114)
ESTIMATED AVERAGE GLUCOSE: 120 MG/DL — HIGH (ref 68–114)
GLUCOSE SERPL-MCNC: 141 MG/DL — HIGH (ref 70–99)
GLUCOSE SERPL-MCNC: 141 MG/DL — HIGH (ref 70–99)
GLUCOSE UR QL: NEGATIVE MG/DL — SIGNIFICANT CHANGE UP
GLUCOSE UR QL: NEGATIVE MG/DL — SIGNIFICANT CHANGE UP
HCT VFR BLD CALC: 43.4 % — SIGNIFICANT CHANGE UP (ref 39–50)
HCT VFR BLD CALC: 43.4 % — SIGNIFICANT CHANGE UP (ref 39–50)
HCV AB S/CO SERPL IA: 0.09 S/CO — SIGNIFICANT CHANGE UP (ref 0–0.99)
HCV AB S/CO SERPL IA: 0.09 S/CO — SIGNIFICANT CHANGE UP (ref 0–0.99)
HCV AB SERPL-IMP: SIGNIFICANT CHANGE UP
HCV AB SERPL-IMP: SIGNIFICANT CHANGE UP
HGB BLD-MCNC: 15 G/DL — SIGNIFICANT CHANGE UP (ref 13–17)
HGB BLD-MCNC: 15 G/DL — SIGNIFICANT CHANGE UP (ref 13–17)
HIV 1 & 2 AB SERPL IA.RAPID: SIGNIFICANT CHANGE UP
HIV 1 & 2 AB SERPL IA.RAPID: SIGNIFICANT CHANGE UP
KETONES UR-MCNC: NEGATIVE MG/DL — SIGNIFICANT CHANGE UP
KETONES UR-MCNC: NEGATIVE MG/DL — SIGNIFICANT CHANGE UP
LEUKOCYTE ESTERASE UR-ACNC: NEGATIVE — SIGNIFICANT CHANGE UP
LEUKOCYTE ESTERASE UR-ACNC: NEGATIVE — SIGNIFICANT CHANGE UP
MAGNESIUM SERPL-MCNC: 1.8 MG/DL — SIGNIFICANT CHANGE UP (ref 1.6–2.6)
MAGNESIUM SERPL-MCNC: 1.8 MG/DL — SIGNIFICANT CHANGE UP (ref 1.6–2.6)
MCHC RBC-ENTMCNC: 29.9 PG — SIGNIFICANT CHANGE UP (ref 27–34)
MCHC RBC-ENTMCNC: 29.9 PG — SIGNIFICANT CHANGE UP (ref 27–34)
MCHC RBC-ENTMCNC: 34.6 GM/DL — SIGNIFICANT CHANGE UP (ref 32–36)
MCHC RBC-ENTMCNC: 34.6 GM/DL — SIGNIFICANT CHANGE UP (ref 32–36)
MCV RBC AUTO: 86.5 FL — SIGNIFICANT CHANGE UP (ref 80–100)
MCV RBC AUTO: 86.5 FL — SIGNIFICANT CHANGE UP (ref 80–100)
MRSA PCR RESULT.: SIGNIFICANT CHANGE UP
NITRITE UR-MCNC: NEGATIVE — SIGNIFICANT CHANGE UP
NITRITE UR-MCNC: NEGATIVE — SIGNIFICANT CHANGE UP
PH UR: 6 — SIGNIFICANT CHANGE UP (ref 5–8)
PH UR: 6 — SIGNIFICANT CHANGE UP (ref 5–8)
PHOSPHATE SERPL-MCNC: 1.6 MG/DL — LOW (ref 2.4–4.7)
PHOSPHATE SERPL-MCNC: 1.6 MG/DL — LOW (ref 2.4–4.7)
PLATELET # BLD AUTO: 327 K/UL — SIGNIFICANT CHANGE UP (ref 150–400)
PLATELET # BLD AUTO: 327 K/UL — SIGNIFICANT CHANGE UP (ref 150–400)
POTASSIUM SERPL-MCNC: 4.5 MMOL/L — SIGNIFICANT CHANGE UP (ref 3.5–5.3)
POTASSIUM SERPL-MCNC: 4.5 MMOL/L — SIGNIFICANT CHANGE UP (ref 3.5–5.3)
POTASSIUM SERPL-SCNC: 4.5 MMOL/L — SIGNIFICANT CHANGE UP (ref 3.5–5.3)
POTASSIUM SERPL-SCNC: 4.5 MMOL/L — SIGNIFICANT CHANGE UP (ref 3.5–5.3)
PROT SERPL-MCNC: 6.5 G/DL — LOW (ref 6.6–8.7)
PROT SERPL-MCNC: 6.5 G/DL — LOW (ref 6.6–8.7)
PROT UR-MCNC: NEGATIVE MG/DL — SIGNIFICANT CHANGE UP
PROT UR-MCNC: NEGATIVE MG/DL — SIGNIFICANT CHANGE UP
RBC # BLD: 5.02 M/UL — SIGNIFICANT CHANGE UP (ref 4.2–5.8)
RBC # BLD: 5.02 M/UL — SIGNIFICANT CHANGE UP (ref 4.2–5.8)
RBC # FLD: 12.5 % — SIGNIFICANT CHANGE UP (ref 10.3–14.5)
RBC # FLD: 12.5 % — SIGNIFICANT CHANGE UP (ref 10.3–14.5)
S AUREUS DNA NOSE QL NAA+PROBE: SIGNIFICANT CHANGE UP
SODIUM SERPL-SCNC: 141 MMOL/L — SIGNIFICANT CHANGE UP (ref 135–145)
SODIUM SERPL-SCNC: 141 MMOL/L — SIGNIFICANT CHANGE UP (ref 135–145)
SP GR SPEC: 1.02 — SIGNIFICANT CHANGE UP (ref 1–1.03)
SP GR SPEC: 1.02 — SIGNIFICANT CHANGE UP (ref 1–1.03)
TSH SERPL-MCNC: 0.53 UIU/ML — SIGNIFICANT CHANGE UP (ref 0.27–4.2)
TSH SERPL-MCNC: 0.53 UIU/ML — SIGNIFICANT CHANGE UP (ref 0.27–4.2)
UROBILINOGEN FLD QL: 1 MG/DL — SIGNIFICANT CHANGE UP (ref 0.2–1)
UROBILINOGEN FLD QL: 1 MG/DL — SIGNIFICANT CHANGE UP (ref 0.2–1)
WBC # BLD: 10.25 K/UL — SIGNIFICANT CHANGE UP (ref 3.8–10.5)
WBC # BLD: 10.25 K/UL — SIGNIFICANT CHANGE UP (ref 3.8–10.5)
WBC # FLD AUTO: 10.25 K/UL — SIGNIFICANT CHANGE UP (ref 3.8–10.5)
WBC # FLD AUTO: 10.25 K/UL — SIGNIFICANT CHANGE UP (ref 3.8–10.5)

## 2023-12-23 PROCEDURE — 93306 TTE W/DOPPLER COMPLETE: CPT | Mod: 26

## 2023-12-23 PROCEDURE — 93970 EXTREMITY STUDY: CPT | Mod: 26

## 2023-12-23 PROCEDURE — 36415 COLL VENOUS BLD VENIPUNCTURE: CPT

## 2023-12-23 PROCEDURE — 85027 COMPLETE CBC AUTOMATED: CPT

## 2023-12-23 PROCEDURE — 93005 ELECTROCARDIOGRAM TRACING: CPT

## 2023-12-23 PROCEDURE — 83735 ASSAY OF MAGNESIUM: CPT

## 2023-12-23 PROCEDURE — 86703 HIV-1/HIV-2 1 RESULT ANTBDY: CPT

## 2023-12-23 PROCEDURE — 83036 HEMOGLOBIN GLYCOSYLATED A1C: CPT

## 2023-12-23 PROCEDURE — 84443 ASSAY THYROID STIM HORMONE: CPT

## 2023-12-23 PROCEDURE — 86803 HEPATITIS C AB TEST: CPT

## 2023-12-23 PROCEDURE — 99233 SBSQ HOSP IP/OBS HIGH 50: CPT

## 2023-12-23 PROCEDURE — 99285 EMERGENCY DEPT VISIT HI MDM: CPT | Mod: 25

## 2023-12-23 PROCEDURE — 85610 PROTHROMBIN TIME: CPT

## 2023-12-23 PROCEDURE — 71260 CT THORAX DX C+: CPT | Mod: MA

## 2023-12-23 PROCEDURE — 74177 CT ABD & PELVIS W/CONTRAST: CPT | Mod: MA

## 2023-12-23 PROCEDURE — 99223 1ST HOSP IP/OBS HIGH 75: CPT

## 2023-12-23 PROCEDURE — 81003 URINALYSIS AUTO W/O SCOPE: CPT

## 2023-12-23 PROCEDURE — 84100 ASSAY OF PHOSPHORUS: CPT

## 2023-12-23 PROCEDURE — 87640 STAPH A DNA AMP PROBE: CPT

## 2023-12-23 PROCEDURE — 87641 MR-STAPH DNA AMP PROBE: CPT

## 2023-12-23 PROCEDURE — 85730 THROMBOPLASTIN TIME PARTIAL: CPT

## 2023-12-23 PROCEDURE — 93970 EXTREMITY STUDY: CPT

## 2023-12-23 PROCEDURE — 70553 MRI BRAIN STEM W/O & W/DYE: CPT | Mod: MA

## 2023-12-23 PROCEDURE — 80053 COMPREHEN METABOLIC PANEL: CPT

## 2023-12-23 PROCEDURE — C8929: CPT

## 2023-12-23 RX ORDER — POVIDONE-IODINE 5 %
1 AEROSOL (ML) TOPICAL ONCE
Refills: 0 | Status: DISCONTINUED | OUTPATIENT
Start: 2023-12-23 | End: 2023-12-23

## 2023-12-23 RX ORDER — DEXAMETHASONE 0.5 MG/5ML
10 ELIXIR ORAL ONCE
Refills: 0 | Status: COMPLETED | OUTPATIENT
Start: 2023-12-23 | End: 2023-12-23

## 2023-12-23 RX ORDER — MAGNESIUM SULFATE 500 MG/ML
1 VIAL (ML) INJECTION ONCE
Refills: 0 | Status: COMPLETED | OUTPATIENT
Start: 2023-12-23 | End: 2023-12-23

## 2023-12-23 RX ORDER — LEVETIRACETAM 250 MG/1
500 TABLET, FILM COATED ORAL
Refills: 0 | Status: DISCONTINUED | OUTPATIENT
Start: 2023-12-23 | End: 2023-12-23

## 2023-12-23 RX ORDER — LEVETIRACETAM 250 MG/1
1 TABLET, FILM COATED ORAL
Qty: 60 | Refills: 0
Start: 2023-12-23 | End: 2024-01-21

## 2023-12-23 RX ORDER — DEXAMETHASONE 0.5 MG/5ML
1 ELIXIR ORAL
Qty: 56 | Refills: 0
Start: 2023-12-23 | End: 2024-01-05

## 2023-12-23 RX ORDER — ACETAMINOPHEN 500 MG
2 TABLET ORAL
Qty: 0 | Refills: 0 | DISCHARGE
Start: 2023-12-23

## 2023-12-23 RX ORDER — DEXAMETHASONE 0.5 MG/5ML
4 ELIXIR ORAL EVERY 6 HOURS
Refills: 0 | Status: DISCONTINUED | OUTPATIENT
Start: 2023-12-23 | End: 2023-12-23

## 2023-12-23 RX ORDER — LOSARTAN POTASSIUM 100 MG/1
25 TABLET, FILM COATED ORAL DAILY
Refills: 0 | Status: DISCONTINUED | OUTPATIENT
Start: 2023-12-23 | End: 2023-12-23

## 2023-12-23 RX ADMIN — LEVETIRACETAM 400 MILLIGRAM(S): 250 TABLET, FILM COATED ORAL at 05:14

## 2023-12-23 RX ADMIN — Medication 85 MILLIMOLE(S): at 06:31

## 2023-12-23 RX ADMIN — Medication 1 TABLET(S): at 13:00

## 2023-12-23 RX ADMIN — Medication 650 MILLIGRAM(S): at 09:21

## 2023-12-23 RX ADMIN — Medication 102 MILLIGRAM(S): at 13:00

## 2023-12-23 RX ADMIN — LEVETIRACETAM 500 MILLIGRAM(S): 250 TABLET, FILM COATED ORAL at 17:04

## 2023-12-23 RX ADMIN — Medication 100 GRAM(S): at 05:14

## 2023-12-23 RX ADMIN — Medication 650 MILLIGRAM(S): at 10:00

## 2023-12-23 NOTE — DISCHARGE NOTE PROVIDER - CARE PROVIDER_API CALL
Tracie Deleon  Neurosurgery  [address] [place] [id number]  Phone: [phone]  Fax: [phone]  Follow Up Time:    Tracie Deleon  Neurosurgery  [address] [place] [id number]  Phone: [phone]  Fax: [phone]  Follow Up Time:

## 2023-12-23 NOTE — DISCHARGE NOTE PROVIDER - NSDCMRMEDTOKEN_GEN_ALL_CORE_FT
acetaminophen 325 mg oral tablet: 2 tab(s) orally every 6 hours As needed Temp greater or equal to 38C (100.4F), Mild Pain (1 - 3)  dexAMETHasone 4 mg oral tablet: 1 tab(s) orally every 6 hours  hydroCHLOROthiazide 25 mg oral tablet: 1 tab(s) orally once a day  levETIRAcetam 500 mg oral tablet: 1 tab(s) orally 2 times a day  losartan 25 mg oral tablet: 1 tab(s) orally once a day  Multiple Vitamins oral tablet: 1 tab(s) orally once a day

## 2023-12-23 NOTE — PROGRESS NOTE ADULT - SUBJECTIVE AND OBJECTIVE BOX
Chief complaint:   Patient is a 58y old  Male who presents with a chief complaint of R frontal brain tumor (23 Dec 2023 01:42)    HPI:  58M with PMHx of HTN, HLD, melanoma x3 last resection 1 year ago never requiring systemic treatment who presented today after being altered, L facial droop, and HA. Per patient, his children have noticed him being "off" for the past 1 month with intermittent confusion and altered speech while talking with him on the phone. Yesterday, patient flew from [place] to visit family, and his children confirmed altered speech and facial droop which prompted patient to visit [facility] this am. Patient admits to severe HA 10/10 currently, has tried using extra strength tylenol without relief, and nausea this am now resolved. Patient also admits to worsening vision / blurred vision x1 month. Patient denies weakness, numbness, tingling, difficulty walking, weight loss, CP, SOB, N/V. CTH at [facility] showed 4.6x4.2 cm R frontal mass w/ additional  3.3x1.6cm adjacent lesion. Pt was transferred to [facility] for further neurosurgical evaluation.    (22 Dec 2023 21:06)        24hr EVENTS:      ROS: [ ]  Unable to assess due to mental status   All other systems negative    -----------------------------------------------------------------------------------------------------------------------------------------------------------------------------------  ICU Vital Signs Last 24 Hrs  T(C): 36.6 (23 Dec 2023 04:51), Max: 36.8 (22 Dec 2023 15:12)  T(F): 97.9 (23 Dec 2023 04:51), Max: 98.3 (22 Dec 2023 18:54)  HR: 82 (23 Dec 2023 09:00) (65 - 92)  BP: 132/89 (23 Dec 2023 09:00) (96/67 - 148/85)  BP(mean): 100 (23 Dec 2023 09:00) (77 - 107)  ABP: --  ABP(mean): --  RR: 19 (23 Dec 2023 09:00) (12 - 28)  SpO2: 86% (23 Dec 2023 09:00) (86% - 99%)    O2 Parameters below as of 23 Dec 2023 08:00  Patient On (Oxygen Delivery Method): room air            I&O's Summary    22 Dec 2023 07:01  -  23 Dec 2023 07:00  --------------------------------------------------------  IN: 0 mL / OUT: 400 mL / NET: -400 mL    23 Dec 2023 07:01  -  23 Dec 2023 09:33  --------------------------------------------------------  IN: 240 mL / OUT: 0 mL / NET: 240 mL        MEDICATIONS  (STANDING):  enoxaparin Injectable 40 milliGRAM(s) SubCutaneous every 24 hours  influenza   Vaccine 0.5 milliLiter(s) IntraMuscular once  levETIRAcetam  IVPB 500 milliGRAM(s) IV Intermittent every 12 hours  multivitamin 1 Tablet(s) Oral daily  polyethylene glycol 3350 17 Gram(s) Oral daily  senna 2 Tablet(s) Oral at bedtime      RESPIRATORY:        IMAGING:   Recent imaging studies were reviewed.    LAB RESULTS:                          15.0   10.25 )-----------( 327      ( 23 Dec 2023 02:45 )             43.4       PT/INR - ( 22 Dec 2023 21:09 )   PT: 10.9 sec;   INR: 0.98 ratio         PTT - ( 22 Dec 2023 21:09 )  PTT:28.2 sec    12-23    141  |  108  |  15.1  ----------------------------<  141<H>  4.5   |  23.0  |  0.77    Ca    8.3<L>      23 Dec 2023 02:45  Phos  1.6     12-23  Mg     1.8     12-23    TPro  6.5<L>  /  Alb  4.0  /  TBili  0.4  /  DBili  x   /  AST  20  /  ALT  26  /  AlkPhos  77  12-23      -----------------------------------------------------------------------------------------------------------------------------------------------------------------------------------    PHYSICAL EXAM:  General: Calm, intubated  HEENT: MMM  Neuro:  -Mental status- No acute distress  -CN- PERRL 3mm, EOMI, tongue midline, face symmetric    CV: RRR  Pulm: clear to auscultation  Abd: Soft, nontender, nondistended  Ext: no noted edema in lower ext  Skin: warm, dry       Chief complaint:   Patient is a 58y old  Male who presents with a chief complaint of R frontal brain tumor (23 Dec 2023 01:42)    HPI:  58M with PMHx of HTN, HLD, melanoma x3 last resection 1 year ago never requiring systemic treatment who presented today after being altered, L facial droop, and HA. Per patient, his children have noticed him being "off" for the past 1 month with intermittent confusion and altered speech while talking with him on the phone. Yesterday, patient flew from [place] to visit family, and his children confirmed altered speech and facial droop which prompted patient to visit [facility] this am. Patient admits to severe HA 10/10 currently, has tried using extra strength tylenol without relief, and nausea this am now resolved. Patient also admits to worsening vision / blurred vision x1 month. Patient denies weakness, numbness, tingling, difficulty walking, weight loss, CP, SOB, N/V. CTH at [facility] showed 4.6x4.2 cm R frontal mass w/ additional  3.3x1.6cm adjacent lesion. Pt was transferred to [facility] for further neurosurgical evaluation.    (22 Dec 2023 21:06)        24hr EVENTS:      ROS: [ ]  Unable to assess due to mental status   All other systems negative    -----------------------------------------------------------------------------------------------------------------------------------------------------------------------------------  ICU Vital Signs Last 24 Hrs  T(C): 36.6 (23 Dec 2023 04:51), Max: 36.8 (22 Dec 2023 15:12)  T(F): 97.9 (23 Dec 2023 04:51), Max: 98.3 (22 Dec 2023 18:54)  HR: 82 (23 Dec 2023 09:00) (65 - 92)  BP: 132/89 (23 Dec 2023 09:00) (96/67 - 148/85)  BP(mean): 100 (23 Dec 2023 09:00) (77 - 107)  ABP: --  ABP(mean): --  RR: 19 (23 Dec 2023 09:00) (12 - 28)  SpO2: 86% (23 Dec 2023 09:00) (86% - 99%)    O2 Parameters below as of 23 Dec 2023 08:00  Patient On (Oxygen Delivery Method): room air            I&O's Summary    22 Dec 2023 07:01  -  23 Dec 2023 07:00  --------------------------------------------------------  IN: 0 mL / OUT: 400 mL / NET: -400 mL    23 Dec 2023 07:01  -  23 Dec 2023 09:33  --------------------------------------------------------  IN: 240 mL / OUT: 0 mL / NET: 240 mL        MEDICATIONS  (STANDING):  enoxaparin Injectable 40 milliGRAM(s) SubCutaneous every 24 hours  influenza   Vaccine 0.5 milliLiter(s) IntraMuscular once  levETIRAcetam  IVPB 500 milliGRAM(s) IV Intermittent every 12 hours  multivitamin 1 Tablet(s) Oral daily  polyethylene glycol 3350 17 Gram(s) Oral daily  senna 2 Tablet(s) Oral at bedtime      RESPIRATORY:        IMAGING:   Recent imaging studies were reviewed.    LAB RESULTS:                          15.0   10.25 )-----------( 327      ( 23 Dec 2023 02:45 )             43.4       PT/INR - ( 22 Dec 2023 21:09 )   PT: 10.9 sec;   INR: 0.98 ratio         PTT - ( 22 Dec 2023 21:09 )  PTT:28.2 sec    12-23    141  |  108  |  15.1  ----------------------------<  141<H>  4.5   |  23.0  |  0.77    Ca    8.3<L>      23 Dec 2023 02:45  Phos  1.6     12-23  Mg     1.8     12-23    TPro  6.5<L>  /  Alb  4.0  /  TBili  0.4  /  DBili  x   /  AST  20  /  ALT  26  /  AlkPhos  77  12-23      -----------------------------------------------------------------------------------------------------------------------------------------------------------------------------------    PHYSICAL EXAM:  General: Calm, intubated  HEENT: MMM  Neuro:  -Mental status- No acute distress  -CN- PERRL 3mm, EOMI, tongue midline, face symmetric    CV: RRR  Pulm: clear to auscultation  Abd: Soft, nontender, nondistended  Ext: no noted edema in lower ext  Skin: warm, dry       Chief complaint:   Patient is a 58y old  Male who presents with a chief complaint of R frontal brain tumor (23 Dec 2023 01:42)    HPI:  58M with PMHx of HTN, HLD, melanoma x3 last resection 1 year ago never requiring systemic treatment who presented today after being altered, L facial droop, and HA. Per patient, his children have noticed him being "off" for the past 1 month with intermittent confusion and altered speech while talking with him on the phone. Yesterday, patient flew from [place] to visit family, and his children confirmed altered speech and facial droop which prompted [place] [facility] [place] Patient admits to severe HA 10/10 currently, has tried using extra strength tylenol without relief, and nausea this am now resolved. Patient also admits to worsening vision / blurred vision x1 month. Patient denies weakness, numbness, tingling, difficulty walking, weight loss, CP, SOB, N/V. [place] [facility] [place] 4.6x4.2 cm R frontal mass w/ additional  3.3x1.6cm adjacent lesion. Pt was transferred to [facility] for further neurosurgical evaluation.    (22 Dec 2023 21:06)    24hr EVENTS: no acute issues    ROS: no complaints  All other systems negative    -----------------------------------------------------------------------------------------------------------------------------------------------------------------------------------  ICU Vital Signs Last 24 Hrs  T(C): 36.6 (23 Dec 2023 04:51), Max: 36.8 (22 Dec 2023 15:12)  T(F): 97.9 (23 Dec 2023 04:51), Max: 98.3 (22 Dec 2023 18:54)  HR: 82 (23 Dec 2023 09:00) (65 - 92)  BP: 132/89 (23 Dec 2023 09:00) (96/67 - 148/85)  BP(mean): 100 (23 Dec 2023 09:00) (77 - 107)  ABP: --  ABP(mean): --  RR: 19 (23 Dec 2023 09:00) (12 - 28)  SpO2: 86% (23 Dec 2023 09:00) (86% - 99%)    O2 Parameters below as of 23 Dec 2023 08:00  Patient On (Oxygen Delivery Method): room air      I&O's Summary    22 Dec 2023 07:01  -  23 Dec 2023 07:00  --------------------------------------------------------  IN: 0 mL / OUT: 400 mL / NET: -400 mL    23 Dec 2023 07:01  -  23 Dec 2023 09:33  --------------------------------------------------------  IN: 240 mL / OUT: 0 mL / NET: 240 mL        MEDICATIONS  (STANDING):  enoxaparin Injectable 40 milliGRAM(s) SubCutaneous every 24 hours  influenza   Vaccine 0.5 milliLiter(s) IntraMuscular once  levETIRAcetam  IVPB 500 milliGRAM(s) IV Intermittent every 12 hours  multivitamin 1 Tablet(s) Oral daily  polyethylene glycol 3350 17 Gram(s) Oral daily  senna 2 Tablet(s) Oral at bedtime    IMAGING:   Recent imaging studies were reviewed.    LAB RESULTS:                          15.0   10.25 )-----------( 327      ( 23 Dec 2023 02:45 )             43.4       PT/INR - ( 22 Dec 2023 21:09 )   PT: 10.9 sec;   INR: 0.98 ratio         PTT - ( 22 Dec 2023 21:09 )  PTT:28.2 sec    12-23    141  |  108  |  15.1  ----------------------------<  141<H>  4.5   |  23.0  |  0.77    Ca    8.3<L>      23 Dec 2023 02:45  Phos  1.6     12-23  Mg     1.8     12-23    TPro  6.5<L>  /  Alb  4.0  /  TBili  0.4  /  DBili  x   /  AST  20  /  ALT  26  /  AlkPhos  77  12-23      -----------------------------------------------------------------------------------------------------------------------------------------------------------------------------------    PHYSICAL EXAM:  General: Calm, laying in bed  HEENT: MMM  Neuro:  -Mental status- No acute distress, AOx3, conversational, following commands  -CN- PERRL 3mm, EOMI, tongue midline, L facial droop  -Motor- full strength in all ext  -Sensation- intact to LT   -Coordination- no dysmetria noted    CV: RRR  Pulm: Clear to auscultation  Abd: Soft, nontender, nondistended  Ext: No edema  Skin: warm, dry       Chief complaint:   Patient is a 58y old  Male who presents with a chief complaint of R frontal brain tumor (23 Dec 2023 01:42)    HPI:  58M with PMHx of HTN, HLD, melanoma x3 last resection 1 year ago never requiring systemic treatment who presented today after being altered, L facial droop, and HA. Per patient, his children have noticed him being "off" for the past 1 month with intermittent confusion and altered speech while talking with him on the phone. Yesterday, patient flew from [place] to visit family, and his children confirmed altered speech and facial droop which prompted patient to visit [facility] this am. Patient admits to severe HA 10/10 currently, has tried using extra strength tylenol without relief, and nausea this am now resolved. Patient also admits to worsening vision / blurred vision x1 month. Patient denies weakness, numbness, tingling, difficulty walking, weight loss, CP, SOB, N/V. CTH at [facility] showed 4.6x4.2 cm R frontal mass w/ additional  3.3x1.6cm adjacent lesion. Pt was transferred to [facility] for further neurosurgical evaluation.    (22 Dec 2023 21:06)    24hr EVENTS: no acute issues    ROS: no complaints  All other systems negative    -----------------------------------------------------------------------------------------------------------------------------------------------------------------------------------  ICU Vital Signs Last 24 Hrs  T(C): 36.6 (23 Dec 2023 04:51), Max: 36.8 (22 Dec 2023 15:12)  T(F): 97.9 (23 Dec 2023 04:51), Max: 98.3 (22 Dec 2023 18:54)  HR: 82 (23 Dec 2023 09:00) (65 - 92)  BP: 132/89 (23 Dec 2023 09:00) (96/67 - 148/85)  BP(mean): 100 (23 Dec 2023 09:00) (77 - 107)  ABP: --  ABP(mean): --  RR: 19 (23 Dec 2023 09:00) (12 - 28)  SpO2: 86% (23 Dec 2023 09:00) (86% - 99%)    O2 Parameters below as of 23 Dec 2023 08:00  Patient On (Oxygen Delivery Method): room air      I&O's Summary    22 Dec 2023 07:01  -  23 Dec 2023 07:00  --------------------------------------------------------  IN: 0 mL / OUT: 400 mL / NET: -400 mL    23 Dec 2023 07:01  -  23 Dec 2023 09:33  --------------------------------------------------------  IN: 240 mL / OUT: 0 mL / NET: 240 mL        MEDICATIONS  (STANDING):  enoxaparin Injectable 40 milliGRAM(s) SubCutaneous every 24 hours  influenza   Vaccine 0.5 milliLiter(s) IntraMuscular once  levETIRAcetam  IVPB 500 milliGRAM(s) IV Intermittent every 12 hours  multivitamin 1 Tablet(s) Oral daily  polyethylene glycol 3350 17 Gram(s) Oral daily  senna 2 Tablet(s) Oral at bedtime    IMAGING:   Recent imaging studies were reviewed.    LAB RESULTS:                          15.0   10.25 )-----------( 327      ( 23 Dec 2023 02:45 )             43.4       PT/INR - ( 22 Dec 2023 21:09 )   PT: 10.9 sec;   INR: 0.98 ratio         PTT - ( 22 Dec 2023 21:09 )  PTT:28.2 sec    12-23    141  |  108  |  15.1  ----------------------------<  141<H>  4.5   |  23.0  |  0.77    Ca    8.3<L>      23 Dec 2023 02:45  Phos  1.6     12-23  Mg     1.8     12-23    TPro  6.5<L>  /  Alb  4.0  /  TBili  0.4  /  DBili  x   /  AST  20  /  ALT  26  /  AlkPhos  77  12-23      -----------------------------------------------------------------------------------------------------------------------------------------------------------------------------------    PHYSICAL EXAM:  General: Calm, laying in bed  HEENT: MMM  Neuro:  -Mental status- No acute distress, AOx3, conversational, following commands  -CN- PERRL 3mm, EOMI, tongue midline, L facial droop  -Motor- full strength in all ext  -Sensation- intact to LT   -Coordination- no dysmetria noted    CV: RRR  Pulm: Clear to auscultation  Abd: Soft, nontender, nondistended  Ext: No edema  Skin: warm, dry

## 2023-12-23 NOTE — CONSULT NOTE ADULT - SUBJECTIVE AND OBJECTIVE BOX
Patient is a 58y old  Male who presents with a chief complaint of R frontal brain tumor , admitted to NS service , R craniotomy planned .   Medicine consult for optimization / clearance recommended .   Patient seen and examined , AAOX4, No complaints . Son ( Mr Fermín Mario Jr ) at bed sided - Podiatry resident .   Patient denies sob/chest pain , denies n/v, voiding .     CC: headache , , altered speech and confusion for last month - now resolved       HPI:  58M with PMHx of HTN, HLD, prostatitis in the past ,  melanoma x3 last resection 1 year ago never requiring systemic treatment who presented today after being altered, L facial droop, and HA. Per patient, his children have noticed him being "off" for the past 1 month with intermittent confusion and altered speech while talking with him on the phone. Yesterday, patient flew from [place] to visit family, and his children confirmed altered speech and facial droop which prompted [facility]. Patient admits to severe HA 10/10 currently, has tried using extra strength tylenol without relief, and nausea this am now resolved. Patient also admits to worsening vision / blurred vision x1 month. Patient denies weakness, numbness, tingling, difficulty walking, weight loss, CP, SOB, N/V. [facility] showed 4.6x4.2 cm R frontal mass w/ additional  3.3x1.6cm adjacent lesion. Pt was transferred to [facility] for further neurosurgical evaluation.         PAST MEDICAL & SURGICAL HISTORY:  Melanoma      Hypertension      HLD (hyperlipidemia)      S/P rotator cuff repair    Prostatitis           Social History:  Tobacco - socially smoked x 15 yrs , quit 15 yrs ago   ETOH - use to drink heavy x1 yr , quit 1 yr ago   Illicit drug abuse - denies  Use THC gummies PRN since 3/23 for shoulder pain   Lives in [place] , has 2 kids     FAMILY HISTORY:  Brother with DM-2  Father with CAD , multiple stents ( first at age 50) - alive        Allergies    No Known Allergies    Intolerances        HOME MEDICATIONS :     HCTZ  25 mg daily   Rosuvastatin 20 mg daily       REVIEW OF SYSTEMS:    As above , now all systems reviewed and are negative .     MEDICATIONS  (STANDING):  dexAMETHasone     Tablet 4 milliGRAM(s) Oral every 6 hours  enoxaparin Injectable 40 milliGRAM(s) SubCutaneous every 24 hours  influenza   Vaccine 0.5 milliLiter(s) IntraMuscular once  levETIRAcetam 500 milliGRAM(s) Oral two times a day  losartan 25 milliGRAM(s) Oral daily  multivitamin 1 Tablet(s) Oral daily  polyethylene glycol 3350 17 Gram(s) Oral daily  senna 2 Tablet(s) Oral at bedtime    MEDICATIONS  (PRN):  acetaminophen     Tablet .. 650 milliGRAM(s) Oral every 6 hours PRN Temp greater or equal to 38C (100.4F), Mild Pain (1 - 3)  hydrALAZINE Injectable 10 milliGRAM(s) IV Push every 2 hours PRN SBP>160  labetalol Injectable 10 milliGRAM(s) IV Push every 2 hours PRN Systolic blood pressure > 160  ondansetron Injectable 4 milliGRAM(s) IV Push every 6 hours PRN Nausea and/or Vomiting  oxyCODONE    IR 10 milliGRAM(s) Oral every 4 hours PRN Severe Pain (7 - 10)  oxyCODONE    IR 5 milliGRAM(s) Oral every 4 hours PRN Moderate Pain (4 - 6)      Vital Signs Last 24 Hrs  T(C): 36.5 (23 Dec 2023 11:15), Max: 36.8 (22 Dec 2023 15:12)  T(F): 97.7 (23 Dec 2023 11:15), Max: 98.3 (22 Dec 2023 18:54)  HR: 88 (23 Dec 2023 11:00) (65 - 92)  BP: 142/93 (23 Dec 2023 11:00) (96/67 - 148/85)  BP(mean): 108 (23 Dec 2023 11:00) (77 - 108)  RR: 19 (23 Dec 2023 11:00) (12 - 28)  SpO2: 92% (23 Dec 2023 11:00) (83% - 98%)    Parameters below as of 23 Dec 2023 08:00  Patient On (Oxygen Delivery Method): room air        PHYSICAL EXAM:    GENERAL: NAD, well-groomed, well-developed  HEAD:  Atraumatic, Normocephalic  EYES: EOMI, PERRLA, conjunctiva and sclera clear  NECK: Supple, No JVD, Normal thyroid  NERVOUS SYSTEM:  Alert & Oriented X 4, L facial drop +   CHEST/LUNG: CTA  b/l,  no rales, rhonchi, wheezing, or rubs  HEART: Regular rate and rhythm; No murmurs, rubs, or gallops  ABDOMEN: Soft, Nontender, Nondistended; Bowel sounds present  EXTREMITIES:  2+ Peripheral Pulses, No clubbing, cyanosis, or edema ,   LYMPH: No lymphadenopathy noted  SKIN: No rashes or lesions    LABS:                        15.0   10.25 )-----------( 327      ( 23 Dec 2023 02:45 )             43.4     12-23    141  |  108  |  15.1  ----------------------------<  141<H>  4.5   |  23.0  |  0.77    Ca    8.3<L>      23 Dec 2023 02:45  Phos  1.6     12-23  Mg     1.8     12-23    TPro  6.5<L>  /  Alb  4.0  /  TBili  0.4  /  DBili  x   /  AST  20  /  ALT  26  /  AlkPhos  77  12-23    PT/INR - ( 22 Dec 2023 21:09 )   PT: 10.9 sec;   INR: 0.98 ratio         PTT - ( 22 Dec 2023 21:09 )  PTT:28.2 sec    A1C with Estimated Average Glucose in AM (12.23.23 @ 02:45)    A1C with Estimated Average Glucose Result: 5.8 %   Estimated Average Glucose: 120 mg/dL      RADIOLOGY & ADDITIONAL STUDIES:    < from: US Duplex Venous Lower Ext Complete, Bilateral (12.23.23 @ 09:46) >    ACC: [id number] EXAM:  US DPLX LWR EXT VEINS COMPL BI   ORDERED BY: SUSY MAGUIRE     PROCEDURE DATE:  12/23/2023          INTERPRETATION:  CLINICAL INFORMATION: Evaluate for deep vein thrombosis.    COMPARISON: None available.    TECHNIQUE: Duplex sonography of the BILATERAL LOWER extremity veins with   color and spectral Doppler, with and without compression.    FINDINGS:    RIGHT:  Normal compressibility of the RIGHT common femoral, femoral and popliteal   veins.  Doppler examination shows normal spontaneous and phasic flow.  No RIGHT calf vein thrombosis is detected.    LEFT:  Normal compressibility of the LEFT common femoral, femoral and popliteal   veins.  Doppler examination shows normal spontaneous and phasic flow.  No LEFT calf vein thrombosis is detected.    IMPRESSION:  No evidence of deep venous thrombosis in either lower extremity.    < end of copied text >    < from: MR Brain Stereotactic w/wo IV Cont (12.22.23 @ 18:41) >    ACC: [id number] EXAM:  MR BRAIN WAW IC FOR SRS   ORDERED BY: KYAW MERCHANT     PROCEDURE DATE:  12/22/2023          INTERPRETATION:  PROCEDURE: MRI Brain with and without contrast    INDICATION: Brain mass[]    TECHNIQUE: Multiplanar multisequence technique MR imaging of the brain is   obtained using standard protocol. Imaging is acquired both before and   after the IV administration of 8 cc Gadavist, 2 cc discarded.    Exam is done with navigational/isovolumetric technique. Fiducial markers  are placed.    COMPARISON: Same day head CT    < end of copied text >  < from: MR Brain Stereotactic w/wo IV Cont (12.22.23 @ 18:41) >  IMPRESSION:    Large complex mass in the right frontal lobe, comprised of a dominant   necrotic mass, inferior cystic component and suspected leptomeningeal   spread, and satellite enhancing nodules posterior to it and within the   insula. Larger zone FLAIR signal involves basal ganglia, insula and   further sites of frontal and temporal lobes.    Likely diagnosis is high-grade glioma; correlate with surgical pathology.    --- End of Report ---    < end of copied text >    < from: CT Abdomen and Pelvis w/ IV Cont (12.22.23 @ 17:27) >    ACC: [id number] EXAM:  CT ABDOMEN AND PELVIS IC   ORDERED BY: KYAW MERCHANT     ACC: [id number] EXAM:  CT CHEST IC   ORDERED BY: KYAW MERCHANT     PROCEDURE DATE:  12/22/2023          INTERPRETATION:  CLINICAL INFORMATION: New intracranial lesions. Evaluate   for malignancy/metastasis.    < end of copied text >    EKG - NSR -at 82 bpm , can't r/o anterior infarct , age undetermined              Patient is a 58y old  Male who presents with a chief complaint of R frontal brain tumor , admitted to NS service , R craniotomy planned .   Medicine consult for optimization / clearance recommended .   Patient seen and examined , AAOX4, No complaints . Son ( Mr Fermín Mario Jr ) at bed sided - Podiatry resident .   Patient denies sob/chest pain , denies n/v, voiding .     CC: headache , , altered speech and confusion for last month - now resolved       HPI:  58M with PMHx of HTN, HLD, prostatitis in the past ,  melanoma x3 last resection 1 year ago never requiring systemic treatment who presented today after being altered, L facial droop, and HA. Per patient, his children have noticed him being "off" for the past 1 month with intermittent confusion and altered speech while talking with him on the phone. Yesterday, patient flew from [place] to visit family, and his children confirmed altered speech and facial droop which prompted patient to visit [facility] [place]. Patient admits to severe HA 10/10 currently, has tried using extra strength tylenol without relief, and nausea this am now resolved. Patient also admits to worsening vision / blurred vision x1 month. Patient denies weakness, numbness, tingling, difficulty walking, weight loss, CP, SOB, N/V. [place] at [facility] [place] 4.6x4.2 cm R frontal mass w/ additional  3.3x1.6cm adjacent lesion. Pt was transferred to [facility] for further neurosurgical evaluation.         PAST MEDICAL & SURGICAL HISTORY:  Melanoma      Hypertension      HLD (hyperlipidemia)      S/P rotator cuff repair    Prostatitis           Social History:  Tobacco - socially smoked x 15 yrs , quit 15 yrs ago   ETOH - use to drink heavy x1 yr , quit 1 yr ago   Illicit drug abuse - denies  Use THC gummies PRN since 3/23 for shoulder pain   Lives in [place] , has 2 kids     FAMILY HISTORY:  Brother with DM-2  Father with CAD , multiple stents ( first at age 50) - alive        Allergies    No Known Allergies    Intolerances        HOME MEDICATIONS :     HCTZ  25 mg daily   Rosuvastatin 20 mg daily       REVIEW OF SYSTEMS:    As above , now all systems reviewed and are negative .     MEDICATIONS  (STANDING):  dexAMETHasone     Tablet 4 milliGRAM(s) Oral every 6 hours  enoxaparin Injectable 40 milliGRAM(s) SubCutaneous every 24 hours  influenza   Vaccine 0.5 milliLiter(s) IntraMuscular once  levETIRAcetam 500 milliGRAM(s) Oral two times a day  losartan 25 milliGRAM(s) Oral daily  multivitamin 1 Tablet(s) Oral daily  polyethylene glycol 3350 17 Gram(s) Oral daily  senna 2 Tablet(s) Oral at bedtime    MEDICATIONS  (PRN):  acetaminophen     Tablet .. 650 milliGRAM(s) Oral every 6 hours PRN Temp greater or equal to 38C (100.4F), Mild Pain (1 - 3)  hydrALAZINE Injectable 10 milliGRAM(s) IV Push every 2 hours PRN SBP>160  labetalol Injectable 10 milliGRAM(s) IV Push every 2 hours PRN Systolic blood pressure > 160  ondansetron Injectable 4 milliGRAM(s) IV Push every 6 hours PRN Nausea and/or Vomiting  oxyCODONE    IR 10 milliGRAM(s) Oral every 4 hours PRN Severe Pain (7 - 10)  oxyCODONE    IR 5 milliGRAM(s) Oral every 4 hours PRN Moderate Pain (4 - 6)      Vital Signs Last 24 Hrs  T(C): 36.5 (23 Dec 2023 11:15), Max: 36.8 (22 Dec 2023 15:12)  T(F): 97.7 (23 Dec 2023 11:15), Max: 98.3 (22 Dec 2023 18:54)  HR: 88 (23 Dec 2023 11:00) (65 - 92)  BP: 142/93 (23 Dec 2023 11:00) (96/67 - 148/85)  BP(mean): 108 (23 Dec 2023 11:00) (77 - 108)  RR: 19 (23 Dec 2023 11:00) (12 - 28)  SpO2: 92% (23 Dec 2023 11:00) (83% - 98%)    Parameters below as of 23 Dec 2023 08:00  Patient On (Oxygen Delivery Method): room air        PHYSICAL EXAM:    GENERAL: NAD, well-groomed, well-developed  HEAD:  Atraumatic, Normocephalic  EYES: EOMI, PERRLA, conjunctiva and sclera clear  NECK: Supple, No JVD, Normal thyroid  NERVOUS SYSTEM:  Alert & Oriented X 4, L facial drop +   CHEST/LUNG: CTA  b/l,  no rales, rhonchi, wheezing, or rubs  HEART: Regular rate and rhythm; No murmurs, rubs, or gallops  ABDOMEN: Soft, Nontender, Nondistended; Bowel sounds present  EXTREMITIES:  2+ Peripheral Pulses, No clubbing, cyanosis, or edema ,   LYMPH: No lymphadenopathy noted  SKIN: No rashes or lesions    LABS:                        15.0   10.25 )-----------( 327      ( 23 Dec 2023 02:45 )             43.4     12-23    141  |  108  |  15.1  ----------------------------<  141<H>  4.5   |  23.0  |  0.77    Ca    8.3<L>      23 Dec 2023 02:45  Phos  1.6     12-23  Mg     1.8     12-23    TPro  6.5<L>  /  Alb  4.0  /  TBili  0.4  /  DBili  x   /  AST  20  /  ALT  26  /  AlkPhos  77  12-23    PT/INR - ( 22 Dec 2023 21:09 )   PT: 10.9 sec;   INR: 0.98 ratio         PTT - ( 22 Dec 2023 21:09 )  PTT:28.2 sec    A1C with Estimated Average Glucose in AM (12.23.23 @ 02:45)    A1C with Estimated Average Glucose Result: 5.8 %   Estimated Average Glucose: 120 mg/dL      RADIOLOGY & ADDITIONAL STUDIES:    < from: US Duplex Venous Lower Ext Complete, Bilateral (12.23.23 @ 09:46) >    ACC: [id number] EXAM:  US DPLX LWR EXT VEINS COMPL BI   ORDERED BY: SUSY MAGUIRE     PROCEDURE DATE:  12/23/2023          INTERPRETATION:  CLINICAL INFORMATION: Evaluate for deep vein thrombosis.    COMPARISON: None available.    TECHNIQUE: Duplex sonography of the BILATERAL LOWER extremity veins with   color and spectral Doppler, with and without compression.    FINDINGS:    RIGHT:  Normal compressibility of the RIGHT common femoral, femoral and popliteal   veins.  Doppler examination shows normal spontaneous and phasic flow.  No RIGHT calf vein thrombosis is detected.    LEFT:  Normal compressibility of the LEFT common femoral, femoral and popliteal   veins.  Doppler examination shows normal spontaneous and phasic flow.  No LEFT calf vein thrombosis is detected.    IMPRESSION:  No evidence of deep venous thrombosis in either lower extremity.    < end of copied text >    < from: MR Brain Stereotactic w/wo IV Cont (12.22.23 @ 18:41) >    ACC: [id number] EXAM:  MR BRAIN WAW IC FOR SRS   ORDERED BY: KYAW MERCHANT     PROCEDURE DATE:  12/22/2023          INTERPRETATION:  PROCEDURE: MRI Brain with and without contrast    INDICATION: Brain mass[]    TECHNIQUE: Multiplanar multisequence technique MR imaging of the brain is   obtained using standard protocol. Imaging is acquired both before and   after the IV administration of 8 cc Gadavist, 2 cc discarded.    Exam is done with navigational/isovolumetric technique. Fiducial markers  are placed.    COMPARISON: Same day head CT    < end of copied text >  < from: MR Brain Stereotactic w/wo IV Cont (12.22.23 @ 18:41) >  IMPRESSION:    Large complex mass in the right frontal lobe, comprised of a dominant   necrotic mass, inferior cystic component and suspected leptomeningeal   spread, and satellite enhancing nodules posterior to it and within the   insula. Larger zone FLAIR signal involves basal ganglia, insula and   further sites of frontal and temporal lobes.    Likely diagnosis is high-grade glioma; correlate with surgical pathology.    --- End of Report ---    < end of copied text >    < from: CT Abdomen and Pelvis w/ IV Cont (12.22.23 @ 17:27) >    ACC: [id number] EXAM:  CT ABDOMEN AND PELVIS IC   ORDERED BY: KYAW MERCHANT     ACC: [id number] EXAM:  CT CHEST IC   ORDERED BY: KYAW MERCHANT     PROCEDURE DATE:  12/22/2023          INTERPRETATION:  CLINICAL INFORMATION: New intracranial lesions. Evaluate   for malignancy/metastasis.    < end of copied text >    EKG - NSR -at 82 bpm , can't r/o anterior infarct , age undetermined

## 2023-12-23 NOTE — DISCHARGE NOTE PROVIDER - NSDCFUADDINST_GEN_ALL_CORE_FT
please do not engage in strenuous activity, heavy lifting, drive, or return to work or school until cleared by surgeon.

## 2023-12-23 NOTE — CONSULT NOTE ADULT - ASSESSMENT
58M with PMHx of HTN, HLD, prostatitis in the past ,  melanoma x3 last resection 1 year ago never requiring systemic treatment who presented today after being altered, L facial droop, and HA. Per patient, his children have noticed him being "off" for the past 1 month with intermittent confusion and altered speech while talking with him on the phone. Yesterday, patient flew from [place] to visit family, and his children confirmed altered speech and facial droop which prompted patient to visit [facility] [place] this am. Patient admits to severe HA 10/10 currently, has tried using extra strength tylenol without relief, and nausea this am now resolved. Patient also admits to worsening vision / blurred vision x1 month. Patient denies weakness, numbness, tingling, difficulty walking, weight loss, CP, SOB, N/V. CTH at [facility] [place] showed 4.6x4.2 cm R frontal mass w/ additional  3.3x1.6cm adjacent lesion. Pt was transferred to [facility] for further neurosurgical evaluation.       R frontal mass , planned for R craniotomy ,   - on Keppra for seizure prophylaxis - continue as per NS  - on Dexamethasone - continue as per NS   - Neuro checks as per NS  - SBP<160  - pain mgmt     HTN - takes only HCTZ 25 mg daily - as per patient/son BP well controlled prior hospitalization ,   Last reading  - ? due to steroids , if BP remains > 150 may need Rx for Losartan 25 mg daily   HLD - takes Rosuvastatin 20 mg daily - continue     Prediabetes : preop A1C  5.8 , life style changes and need to follow up with PMD in 3 months to check A1C and follow further recommendations .   Consider low carbs diet while on steroids , ISSC while hospitalized     Hx of Melanoma , s/p excision x3 , last excision 1 yr ago     LLL 5 mm nodule- repeat CT chest in 3- 6 months     CT abd- noted with :  Diffuse bladder wall thickening and trabeculation. Findings could be   related to bladder outlet obstruction the setting of prostatomegaly or   cystitis. Enlarge prostate noted . Will recommend bladder scan for PVR ,   Flomax 0.4 mg daily and UA     Hypophosphatemia - supplement .     Revised Cardiac Risk Assessment   [ - ]History of ischemic heart disease   [- ]History of congestive heart failure   [ - ]History of cerebrovascular disease (stroke or transient ischemic attack)   [-  ]History of diabetes requiring preoperative insulin use   [-  ]Chronic kidney disease (creatinine > 2 mg/dL)   [ -  ]Undergoing suprainguinal vascular, intraperitoneal, or intrathoracic surgery     Labs / EKG noted , no prior EKG to compare, as per son ( podiatry resident ) - known abnormal EKG,   Had ECHO / stress test last year in [place]  - no ischemia or severe valvular disease  .   Son willing to get those results to be faxed . Patient denies sob/ chest pain , METS> 4     0 predictors = 0.4%, 1 predictor = 1.0%, 2 predictors = 2.4%, > 3 predictors = 5.4%    * Overall this patient has a  0.4   % risk of cardiac death, nonfatal myocardial infarction, and nonfatal cardiac arrest perioperatively.     Patient does not have any known history of severe valvular disease and is not in decompensated CHF. No recent MI. Baseline functional capacity is > 4 METS. Patient is currently hemodynamically stable and medically optimized pending UA and stress test /ECHO report from [place] .   Call with abnormal UA .     Thank you for the courtesy of this consult .   Call with any questions .        58M with PMHx of HTN, HLD, prostatitis in the past ,  melanoma x3 last resection 1 year ago never requiring systemic treatment who presented today after being altered, L facial droop, and HA. Per patient, his children have noticed him being "off" for the past 1 month with intermittent confusion and altered speech while talking with him on the phone. Yesterday, patient flew from [place] to visit family, and his children confirmed altered speech and facial droop which prompted patient to visit [facility] [place] this am. Patient admits to severe HA 10/10 currently, has tried using extra strength tylenol without relief, and nausea this am now resolved. Patient also admits to worsening vision / blurred vision x1 month. Patient denies weakness, numbness, tingling, difficulty walking, weight loss, CP, SOB, N/V. CTH at [facility] showed 4.6x4.2 cm R frontal mass w/ additional  3.3x1.6cm adjacent lesion. Pt was transferred to [facility] for further neurosurgical evaluation.       R frontal mass , planned for R craniotomy ,   - on Keppra for seizure prophylaxis - continue as per NS  - on Dexamethasone - continue as per NS   - Neuro checks as per NS  - SBP<160  - pain mgmt     HTN - takes only HCTZ 25 mg daily - as per patient/son BP well controlled prior hospitalization ,   Last reading  - ? due to steroids , if BP remains > 150 may need Rx for Losartan 25 mg daily   HLD - takes Rosuvastatin 20 mg daily - continue     Prediabetes : preop A1C  5.8 , life style changes and need to follow up with PMD in 3 months to check A1C and follow further recommendations .   Consider low carbs diet while on steroids , ISSC while hospitalized     Hx of Melanoma , s/p excision x3 , last excision 1 yr ago     LLL 5 mm nodule- repeat CT chest in 3- 6 months     CT abd- noted with :  Diffuse bladder wall thickening and trabeculation. Findings could be   related to bladder outlet obstruction the setting of prostatomegaly or   cystitis. Enlarge prostate noted . Will recommend bladder scan for PVR ,   Flomax 0.4 mg daily and UA     Hypophosphatemia - supplement .     Revised Cardiac Risk Assessment   [ - ]History of ischemic heart disease   [- ]History of congestive heart failure   [ - ]History of cerebrovascular disease (stroke or transient ischemic attack)   [-  ]History of diabetes requiring preoperative insulin use   [-  ]Chronic kidney disease (creatinine > 2 mg/dL)   [ -  ]Undergoing suprainguinal vascular, intraperitoneal, or intrathoracic surgery     Labs / EKG noted , no prior EKG to compare, as per son ( podiatry resident ) - known abnormal EKG,   Had ECHO / stress test last year in [place]  - no ischemia or severe valvular disease  .   Son willing to get those results to be faxed . Patient denies sob/ chest pain , METS> 4     0 predictors = 0.4%, 1 predictor = 1.0%, 2 predictors = 2.4%, > 3 predictors = 5.4%    * Overall this patient has a  0.4   % risk of cardiac death, nonfatal myocardial infarction, and nonfatal cardiac arrest perioperatively.     Patient does not have any known history of severe valvular disease and is not in decompensated CHF. No recent MI. Baseline functional capacity is > 4 METS. Patient is currently hemodynamically stable and medically optimized pending UA and stress test /ECHO report from [place] .   Call with abnormal UA .     Thank you for the courtesy of this consult .   Call with any questions .

## 2023-12-23 NOTE — DISCHARGE NOTE PROVIDER - HOSPITAL COURSE
58M with PMHx of HTN, HLD, melanoma x3 last resection 1 year ago never requiring systemic treatment who presented today after being altered, L facial droop, and HA. Per patient, his children have noticed him being "off" for the past 1 month with intermittent confusion and altered speech while talking with him on the phone. Yesterday, patient flew from [place] to visit family, and his children confirmed altered speech and facial droop which prompted patient to visit [facility] this am. Patient admits to severe HA 10/10 currently, has tried using extra strength tylenol without relief, and nausea this am now resolved. Patient also admits to worsening vision / blurred vision x1 month. Patient denies weakness, numbness, tingling, difficulty walking, weight loss, CP, SOB, N/V. CTH at [facility] showed 4.6x4.2 cm R frontal mass w/ additional  3.3x1.6cm adjacent lesion. Pt was transferred to [facility] for further neurosurgical evaluation. He had a CT head that showed right frontal abnormal lesion 4.6 x 4.2 cm + R lenticular nucleus 3.3 x 1.6 cm lesion.  He also had a MRI of the brain.  Patient will be followed up as an outpatient on 12/27/2023 and scheduled for surgery.  He will be staying with his son who is a physician. 58M with PMHx of HTN, HLD, melanoma x3 last resection 1 year ago never requiring systemic treatment who presented today after being altered, L facial droop, and HA. Per patient, his children have noticed him being "off" for the past 1 month with intermittent confusion and altered speech while talking with him on the phone. Yesterday, patient flew from [place] to visit family, and his children confirmed altered speech and facial droop which prompted patient to [place] [facility] [place]. Patient admits to severe HA 10/10 currently, has tried using extra strength tylenol without relief, and nausea this am now resolved. Patient also admits to worsening vision / blurred vision x1 month. Patient denies weakness, numbness, tingling, difficulty walking, weight loss, CP, SOB, N/V. [place] [facility] [place] 4.6x4.2 cm R frontal mass w/ additional  3.3x1.6cm adjacent lesion. Pt was transferred to [facility] for further neurosurgical evaluation. He had a CT head that showed right frontal abnormal lesion 4.6 x 4.2 cm + R lenticular nucleus 3.3 x 1.6 cm lesion.  He also had a MRI of the brain.  Patient will be followed up as an outpatient on 12/27/2023 and scheduled for surgery.  He will be staying with his son who is a physician.

## 2023-12-23 NOTE — DISCHARGE NOTE NURSING/CASE MANAGEMENT/SOCIAL WORK - NSDCPEFALRISK_GEN_ALL_CORE
For information on Fall & Injury Prevention, visit: [email or web address] OR  [email or web address] OR  https://www.cdc.gov/steadi/patient.html For information on Fall & Injury Prevention, visit: [email or web address] OR  [email or web address] OR  https://www.cdc.gov/steadi/patient.html

## 2023-12-23 NOTE — DISCHARGE NOTE NURSING/CASE MANAGEMENT/SOCIAL WORK - PATIENT PORTAL LINK FT
You can access the FollowMyHealth Patient Portal offered by [facility] by registering at the following website: [email or web address]. By joining [facility]’s FollowMyHealth portal, you will also be able to view your health information using other applications (apps) compatible with our system. You can access the FollowMyHealth Patient Portal offered by [facility] by registering at the following website: [email or web address]. By joining [facility]’s FollowMyHealth portal, you will also be able to view your health information using other applications (apps) compatible with our system.

## 2023-12-23 NOTE — PROGRESS NOTE ADULT - CRITICAL CARE ATTENDING COMMENT
I spent 60 minutes of critical care time examining patient, reviewing vitals, labs, medications, imaging and discussing with the team goals of care to prevent life-threatening in this patient who is at high risk for deterioration or death due to:  brain mass

## 2023-12-23 NOTE — PROGRESS NOTE ADULT - ASSESSMENT
ASSESSMENT/PLAN: 58M with PMHx of HTN, HLD, melanoma x3 last resection 1 year ago never requiring systemic treatment who presented today after being altered, L facial droop, and HA. R frontal mass on MRI    NEURO:   SBP<160  q2hr neurochecks  keppra 500mg bid  pain mgt: tylenol and oxy 5 prn  Activity: [x] mobilize as tolerated [] Bedrest [x] PT [x] OT [] PMNR    PULM: room air   SpO2>92%  incentive spirometry   OOB    CV:  HCTZ and losartan home meds  SBP goal <160    RENAL: monitor I/O  replete lytes prn  voiding  Fluids: IVF while NPO    GI:  Diet: advance diet as tolerated  GI prophylaxis [x] not indicated   zofran prn for nausea  Bowel regimen [x] senna [] other: miralax    ENDO:   Goal euglycemia (-180)  A1c 5.8    HEME/ONC:  VTE prophylaxis: [] SCDs [x] chemoprophylaxis      ID: afebrile   no leukocytosis    MISC:    I affirm that this patient is critically ill and at high risk for sudden, fatal deterioration due to one or more of the above stated active issues.     This time does not include bedside procedures that are documented separately.   ASSESSMENT/PLAN: 58M with PMHx of HTN, HLD, melanoma x3 last resection 1 year ago never requiring systemic treatment who presented today after being altered, L facial droop, and HA. R frontal mass on MRI    NEURO:   SBP<160  q4hr neurochecks  keppra 500mg bid  start decadron  follow up in neurosurgery clinic  pain mgt: tylenol and oxy 5 prn  Activity: [x] mobilize as tolerated [] Bedrest [x] PT [x] OT [] PMNR    PULM: room air   SpO2>92%  incentive spirometry   OOB    CV:  HCTZ and losartan home meds  SBP goal <160    RENAL: monitor I/O  replete lytes prn  voiding  Fluids: IVF while NPO    GI:  Diet: advance diet as tolerated  GI prophylaxis [x] not indicated   zofran prn for nausea  Bowel regimen [x] senna [] other: miralax    ENDO:   Goal euglycemia (-180)  A1c 5.8    HEME/ONC:  VTE prophylaxis: [] SCDs [x] chemoprophylaxis      ID: afebrile   no leukocytosis      This time does not include bedside procedures that are documented separately.

## 2023-12-23 NOTE — PROGRESS NOTE ADULT - SUBJECTIVE AND OBJECTIVE BOX
HPI:  58M with PMHx of HTN, HLD, melanoma x3 last resection 1 year ago never requiring systemic treatment who presented today after being altered, L facial droop, and HA. Per patient, his children have noticed him being "off" for the past 1 month with intermittent confusion and altered speech while talking with him on the phone. Yesterday, patient flew from [place] to visit family, and his children confirmed altered speech and facial droop which prompted patient to visit [facility] this am. Patient admits to severe HA 10/10 currently, has tried using extra strength tylenol without relief, and nausea this am now resolved. Patient also admits to worsening vision / blurred vision x1 month. Patient denies weakness, numbness, tingling, difficulty walking, weight loss, CP, SOB, N/V. CTH at [facility] showed 4.6x4.2 cm R frontal mass w/ additional  3.3x1.6cm adjacent lesion. Pt was transferred to [facility] for further neurosurgical evaluation.    (22 Dec 2023 21:06)      INTERVAL HPI/OVERNIGHT EVENTS:  12/22: admit to ICU for further monitoring     Vital Signs Last 24 Hrs  T(C): 36.6 (23 Dec 2023 00:15), Max: 36.8 (22 Dec 2023 15:12)  T(F): 97.9 (23 Dec 2023 00:15), Max: 98.3 (22 Dec 2023 18:54)  HR: 82 (23 Dec 2023 01:00) (80 - 92)  BP: 120/69 (23 Dec 2023 01:00) (108/74 - 148/85)  BP(mean): 86 (23 Dec 2023 01:00) (83 - 107)  RR: 18 (23 Dec 2023 01:00) (16 - 28)  SpO2: 94% (23 Dec 2023 01:00) (93% - 99%)    Parameters below as of 22 Dec 2023 21:38  Patient On (Oxygen Delivery Method): room air        PHYSICAL EXAM:  GENERAL: NAD, well-groomed  HEAD:  Atraumatic, normocephalic  MENTAL STATUS: AAO x3; Awake/Comatose; Opens eyes spontaneously, Appropriately conversant without aphasia  CRANIAL NERVES: PERRL. EOMI without nystagmus. Facial sensation intact V1-3 distribution b/l. Face symmetric w/ normal eye closure and smile, tongue midline. Hearing grossly intact. Speech clear. Head turning and shoulder shrug intact.   MOTOR: strength 5/5 b/l upper and lower extremities  SENSATION: grossly intact to light touch all extremities  CHEST/LUNG: Chest rise and fall equally and bilaterally   HEART: +S1/+S2;   ABDOMEN: Soft, nontender, nondistended   EXTREMITIES:  2+ peripheral pulses, no clubbing, cyanosis, or edema  SKIN: Warm, dry; no rashes or lesions    LABS:                        16.5   5.71  )-----------( 371      ( 22 Dec 2023 21:09 )             46.8       Phos  2.9     12-22  Mg     2.0     12-22      PT/INR - ( 22 Dec 2023 21:09 )   PT: 10.9 sec;   INR: 0.98 ratio         PTT - ( 22 Dec 2023 21:09 )  PTT:28.2 sec        RADIOLOGY & ADDITIONAL TESTS:    ASSESSMENT & PLAN:     Assessment:  58M with PMH HTN, HLD, melanoma x3 who presented with 1 month of confusion, altered speaking, facial droop, and HA. Patient presented to [facility], CTH showed R frontal lesion x2. Patient transferred to [facility] for further neurosurgical w/u and evaluation.      Plan:  - Discussed with Dr. 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 Larger zone FLAIR signal involves basal ganglia, insula and further sites of frontal and temporal lobes.  Likely diagnosis is high-grade glioma; correlate with surgical pathology.  - CT CAP for mets w/u   - No steroids  - Further plan pending imaging      HPI:  58M with PMHx of HTN, HLD, melanoma x3 last resection 1 year ago never requiring systemic treatment who presented today after being altered, L facial droop, and HA. Per patient, his children have noticed him being "off" for the past 1 month with intermittent confusion and altered speech while talking with him on the phone. Yesterday, patient flew from [place] to visit family, and his children confirmed altered speech and facial droop which prompted patient to visit [facility] this am. Patient admits to severe HA 10/10 currently, has tried using extra strength tylenol without relief, and nausea this am now resolved. Patient also admits to worsening vision / blurred vision x1 month. Patient denies weakness, numbness, tingling, difficulty walking, weight loss, CP, SOB, N/V. CTH at [facility] showed 4.6x4.2 cm R frontal mass w/ additional  3.3x1.6cm adjacent lesion. Pt was transferred to [facility] for further neurosurgical evaluation.    (22 Dec 2023 21:06)      INTERVAL HPI/OVERNIGHT EVENTS:  12/22: admit to ICU for further monitoring     Vital Signs Last 24 Hrs  T(C): 36.6 (23 Dec 2023 00:15), Max: 36.8 (22 Dec 2023 15:12)  T(F): 97.9 (23 Dec 2023 00:15), Max: 98.3 (22 Dec 2023 18:54)  HR: 82 (23 Dec 2023 01:00) (80 - 92)  BP: 120/69 (23 Dec 2023 01:00) (108/74 - 148/85)  BP(mean): 86 (23 Dec 2023 01:00) (83 - 107)  RR: 18 (23 Dec 2023 01:00) (16 - 28)  SpO2: 94% (23 Dec 2023 01:00) (93% - 99%)    Parameters below as of 22 Dec 2023 21:38  Patient On (Oxygen Delivery Method): room air        PHYSICAL EXAM:  GENERAL: NAD, well-groomed  HEAD:  Atraumatic, normocephalic  MENTAL STATUS: AAO x3; Awake/Comatose; Opens eyes spontaneously, Appropriately conversant without aphasia  CRANIAL NERVES: PERRL. EOMI without nystagmus. Facial sensation intact V1-3 distribution b/l. Face symmetric w/ normal eye closure and smile, tongue midline. Hearing grossly intact. Speech clear. Head turning and shoulder shrug intact.   MOTOR: strength 5/5 b/l upper and lower extremities  SENSATION: grossly intact to light touch all extremities  CHEST/LUNG: Chest rise and fall equally and bilaterally   HEART: +S1/+S2;   ABDOMEN: Soft, nontender, nondistended   EXTREMITIES:  2+ peripheral pulses, no clubbing, cyanosis, or edema  SKIN: Warm, dry; no rashes or lesions    LABS:                        16.5   5.71  )-----------( 371      ( 22 Dec 2023 21:09 )             46.8       Phos  2.9     12-22  Mg     2.0     12-22      PT/INR - ( 22 Dec 2023 21:09 )   PT: 10.9 sec;   INR: 0.98 ratio         PTT - ( 22 Dec 2023 21:09 )  PTT:28.2 sec        RADIOLOGY & ADDITIONAL TESTS:    ASSESSMENT & PLAN:     Assessment:  58M with PMH HTN, HLD, melanoma x3 who presented with 1 month of confusion, altered speaking, facial droop, and HA. Patient presented to [facility], CTH showed R frontal lesion x2. Patient transferred to [facility] for further neurosurgical w/u and evaluation.      Plan:  - Discussed with Dr. 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 Larger zone FLAIR signal involves basal ganglia, insula and further sites of frontal and temporal lobes.  Likely diagnosis is high-grade glioma; correlate with surgical pathology.  - CT CAP for mets w/u   - No steroids  - Further plan pending imaging

## 2023-12-26 ENCOUNTER — NON-APPOINTMENT (OUTPATIENT)
Age: 58
End: 2023-12-26

## 2023-12-26 PROBLEM — I10 ESSENTIAL (PRIMARY) HYPERTENSION: Chronic | Status: ACTIVE | Noted: 2023-12-22

## 2023-12-26 PROBLEM — C43.9 MALIGNANT MELANOMA OF SKIN, UNSPECIFIED: Chronic | Status: ACTIVE | Noted: 2023-12-22

## 2023-12-26 PROBLEM — E78.5 HYPERLIPIDEMIA, UNSPECIFIED: Chronic | Status: ACTIVE | Noted: 2023-12-22

## 2023-12-27 ENCOUNTER — APPOINTMENT (OUTPATIENT)
Dept: NEUROSURGERY | Facility: CLINIC | Age: 58
End: 2023-12-27
Payer: COMMERCIAL

## 2023-12-27 VITALS
DIASTOLIC BLOOD PRESSURE: 95 MMHG | OXYGEN SATURATION: 96 % | HEART RATE: 71 BPM | WEIGHT: 187 LBS | SYSTOLIC BLOOD PRESSURE: 148 MMHG | HEIGHT: 70.5 IN | TEMPERATURE: 98 F | BODY MASS INDEX: 26.47 KG/M2

## 2023-12-27 DIAGNOSIS — Z78.9 OTHER SPECIFIED HEALTH STATUS: ICD-10-CM

## 2023-12-27 DIAGNOSIS — Z86.39 PERSONAL HISTORY OF OTHER ENDOCRINE, NUTRITIONAL AND METABOLIC DISEASE: ICD-10-CM

## 2023-12-27 DIAGNOSIS — Z86.79 PERSONAL HISTORY OF OTHER DISEASES OF THE CIRCULATORY SYSTEM: ICD-10-CM

## 2023-12-27 PROCEDURE — 99215 OFFICE O/P EST HI 40 MIN: CPT

## 2023-12-27 NOTE — HISTORY OF PRESENT ILLNESS
[FreeTextEntry1] : Mr. Fabiano Kovacs is a very pleasant 58-year-old male with a history of hypertension, hyperlipidemia, skin melanoma resected 3 times previously (last about 1 year ago) who presents with several months of altered mental status, repetitive speech, blurry vision, worsening memory, and headaches.  Last week, he flew from [place] to visit his family and given altered speech and new left facial droop, he was brought to the ER where he was found to have a large 5 x 4 cm right frontal contrast-enhancing lesion with surrounding mass effect and edema.  He was ultimately discharged home on steroids and Keppra.  He and his children endorse that his personality has been different, he has been more quiet with his speech, he has been much more repetitive and forgetful.  His children notes that he is a  and he began to miss flights or forget his suitcase which is very uncharacteristic of him.  Several months ago he also began having low grade headaches, which he has never had before.  Other than his current left facial droop, he denies any weakness of his left side although his niece has noticed some shaking of his left hand.  No obvious signs of seizures.  A few weeks ago, his son was visiting him in [place] and he had an episode of several minutes of vomiting and unresponsiveness.  He is currently on Keppra and Decadron 4 mg every 6 hours.  No recent aspirin usage.  He has been taking Tylenol for his headaches.  He is here with his mother, his children and his niece.

## 2023-12-27 NOTE — PHYSICAL EXAM
[FreeTextEntry1] : AOx3 PERRL EOMI TM Lower left facial droop No pronator drift Shoulder shrug symmetric BUE/BLE 5/5 strength

## 2023-12-27 NOTE — ASSESSMENT
[FreeTextEntry1] : Mr. Fabiano Kovacs is a very pleasant 58-year-old male with a history of hypertension, hyperlipidemia, skin melanoma resected 3 times previously (last about 1 year ago) who presents with several months of altered mental status, repetitive speech, blurry vision, worsening memory, headaches, and a recently new left facial droop. I reviewed his imaging findings with him and his family demonstrating a large right frontal mass that is the cause of his symptoms. I reviewed the differential diagnosis for this lesion including a primary brain tumor (glioma, astrocytoma, etc.) vs. metastasis vs. lymphoma. I explained my recommendation for surgery in the form of a right craniotomy with 5-ALA assisted tumor resection with the goals of surgery being 1. diagnosis and 2. resection of as much as safely possible. I extensively reviewed the details of surgery, the risks and benefits, and the expected postoperative care in detail. I explained that once we have a diagnosis, he will likely need further treatment with neurooncology/radiation oncology for chemotherapy, radiation, or both. He and his family understood the risks of surgery include, but are not limited to pain, bleeding, infection, CSF leak, seizures, neurological injury including vision, motor, cognition, gait, coordination, sensation changes; blood clots, UTI, pneumonia, stroke, coma, and death.  I also reviewed the risks of 5-ala usage including liver dysfunction and photosensitivity.  In particular because there is a smaller area of contrast enhancement that is located in the temporal lobe near the sylvian veins, there is a chance that this area will not be reachable or safe to resect given the proximity to the vessels. He agreed to proceed with surgery and signed consent. A copy of the consent form as well as possible risks/complications of the procedure were provided to Mr. Kovacs. He will remain on the Keppra and decrease his steroid dose to 4 twice daily prior to Friday.  He knows not to take any additional blood thinners or supplements. All questions answered. He knows to call the office with any issues.

## 2023-12-29 ENCOUNTER — APPOINTMENT (OUTPATIENT)
Dept: NEUROSURGERY | Facility: HOSPITAL | Age: 58
End: 2023-12-29

## 2023-12-29 ENCOUNTER — RESULT REVIEW (OUTPATIENT)
Age: 58
End: 2023-12-29

## 2023-12-29 ENCOUNTER — TRANSCRIPTION ENCOUNTER (OUTPATIENT)
Age: 58
End: 2023-12-29

## 2023-12-29 ENCOUNTER — INPATIENT (INPATIENT)
Facility: HOSPITAL | Age: 58
LOS: 1 days | Discharge: HOME | DRG: 25 | End: 2023-12-31
Attending: STUDENT IN AN ORGANIZED HEALTH CARE EDUCATION/TRAINING PROGRAM | Admitting: STUDENT IN AN ORGANIZED HEALTH CARE EDUCATION/TRAINING PROGRAM
Payer: COMMERCIAL

## 2023-12-29 VITALS
SYSTOLIC BLOOD PRESSURE: 113 MMHG | WEIGHT: 196.21 LBS | DIASTOLIC BLOOD PRESSURE: 95 MMHG | HEIGHT: 70 IN | OXYGEN SATURATION: 99 % | RESPIRATION RATE: 16 BRPM | HEART RATE: 66 BPM | TEMPERATURE: 98 F

## 2023-12-29 DIAGNOSIS — D49.6 NEOPLASM OF UNSPECIFIED BEHAVIOR OF BRAIN: ICD-10-CM

## 2023-12-29 DIAGNOSIS — Z98.890 OTHER SPECIFIED POSTPROCEDURAL STATES: Chronic | ICD-10-CM

## 2023-12-29 LAB
ABO RH CONFIRMATION: SIGNIFICANT CHANGE UP
ABO RH CONFIRMATION: SIGNIFICANT CHANGE UP
ANION GAP SERPL CALC-SCNC: 14 MMOL/L — SIGNIFICANT CHANGE UP (ref 5–17)
ANION GAP SERPL CALC-SCNC: 14 MMOL/L — SIGNIFICANT CHANGE UP (ref 5–17)
APTT BLD: 24.3 SEC — LOW (ref 24.5–35.6)
APTT BLD: 24.3 SEC — LOW (ref 24.5–35.6)
BLD GP AB SCN SERPL QL: SIGNIFICANT CHANGE UP
BLD GP AB SCN SERPL QL: SIGNIFICANT CHANGE UP
BUN SERPL-MCNC: 17.7 MG/DL — SIGNIFICANT CHANGE UP (ref 8–20)
BUN SERPL-MCNC: 17.7 MG/DL — SIGNIFICANT CHANGE UP (ref 8–20)
CALCIUM SERPL-MCNC: 8.4 MG/DL — SIGNIFICANT CHANGE UP (ref 8.4–10.5)
CALCIUM SERPL-MCNC: 8.4 MG/DL — SIGNIFICANT CHANGE UP (ref 8.4–10.5)
CHLORIDE SERPL-SCNC: 100 MMOL/L — SIGNIFICANT CHANGE UP (ref 96–108)
CHLORIDE SERPL-SCNC: 100 MMOL/L — SIGNIFICANT CHANGE UP (ref 96–108)
CO2 SERPL-SCNC: 22 MMOL/L — SIGNIFICANT CHANGE UP (ref 22–29)
CO2 SERPL-SCNC: 22 MMOL/L — SIGNIFICANT CHANGE UP (ref 22–29)
CREAT SERPL-MCNC: 0.79 MG/DL — SIGNIFICANT CHANGE UP (ref 0.5–1.3)
CREAT SERPL-MCNC: 0.79 MG/DL — SIGNIFICANT CHANGE UP (ref 0.5–1.3)
EGFR: 102 ML/MIN/1.73M2 — SIGNIFICANT CHANGE UP
EGFR: 102 ML/MIN/1.73M2 — SIGNIFICANT CHANGE UP
GLUCOSE BLDC GLUCOMTR-MCNC: 110 MG/DL — HIGH (ref 70–99)
GLUCOSE BLDC GLUCOMTR-MCNC: 110 MG/DL — HIGH (ref 70–99)
GLUCOSE BLDC GLUCOMTR-MCNC: 118 MG/DL — HIGH (ref 70–99)
GLUCOSE BLDC GLUCOMTR-MCNC: 118 MG/DL — HIGH (ref 70–99)
GLUCOSE BLDC GLUCOMTR-MCNC: 96 MG/DL — SIGNIFICANT CHANGE UP (ref 70–99)
GLUCOSE BLDC GLUCOMTR-MCNC: 96 MG/DL — SIGNIFICANT CHANGE UP (ref 70–99)
GLUCOSE SERPL-MCNC: 120 MG/DL — HIGH (ref 70–99)
GLUCOSE SERPL-MCNC: 120 MG/DL — HIGH (ref 70–99)
HCT VFR BLD CALC: 39.9 % — SIGNIFICANT CHANGE UP (ref 39–50)
HCT VFR BLD CALC: 39.9 % — SIGNIFICANT CHANGE UP (ref 39–50)
HGB BLD-MCNC: 13.8 G/DL — SIGNIFICANT CHANGE UP (ref 13–17)
HGB BLD-MCNC: 13.8 G/DL — SIGNIFICANT CHANGE UP (ref 13–17)
INR BLD: 0.94 RATIO — SIGNIFICANT CHANGE UP (ref 0.85–1.18)
INR BLD: 0.94 RATIO — SIGNIFICANT CHANGE UP (ref 0.85–1.18)
MAGNESIUM SERPL-MCNC: 1.9 MG/DL — SIGNIFICANT CHANGE UP (ref 1.6–2.6)
MAGNESIUM SERPL-MCNC: 1.9 MG/DL — SIGNIFICANT CHANGE UP (ref 1.6–2.6)
MCHC RBC-ENTMCNC: 30.1 PG — SIGNIFICANT CHANGE UP (ref 27–34)
MCHC RBC-ENTMCNC: 30.1 PG — SIGNIFICANT CHANGE UP (ref 27–34)
MCHC RBC-ENTMCNC: 34.6 GM/DL — SIGNIFICANT CHANGE UP (ref 32–36)
MCHC RBC-ENTMCNC: 34.6 GM/DL — SIGNIFICANT CHANGE UP (ref 32–36)
MCV RBC AUTO: 86.9 FL — SIGNIFICANT CHANGE UP (ref 80–100)
MCV RBC AUTO: 86.9 FL — SIGNIFICANT CHANGE UP (ref 80–100)
MRSA PCR RESULT.: SIGNIFICANT CHANGE UP
MRSA PCR RESULT.: SIGNIFICANT CHANGE UP
PHOSPHATE SERPL-MCNC: 5.2 MG/DL — HIGH (ref 2.4–4.7)
PHOSPHATE SERPL-MCNC: 5.2 MG/DL — HIGH (ref 2.4–4.7)
PLATELET # BLD AUTO: 297 K/UL — SIGNIFICANT CHANGE UP (ref 150–400)
PLATELET # BLD AUTO: 297 K/UL — SIGNIFICANT CHANGE UP (ref 150–400)
POTASSIUM SERPL-MCNC: 4.2 MMOL/L — SIGNIFICANT CHANGE UP (ref 3.5–5.3)
POTASSIUM SERPL-MCNC: 4.2 MMOL/L — SIGNIFICANT CHANGE UP (ref 3.5–5.3)
POTASSIUM SERPL-SCNC: 4.2 MMOL/L — SIGNIFICANT CHANGE UP (ref 3.5–5.3)
POTASSIUM SERPL-SCNC: 4.2 MMOL/L — SIGNIFICANT CHANGE UP (ref 3.5–5.3)
PROTHROM AB SERPL-ACNC: 10.4 SEC — SIGNIFICANT CHANGE UP (ref 9.5–13)
PROTHROM AB SERPL-ACNC: 10.4 SEC — SIGNIFICANT CHANGE UP (ref 9.5–13)
RBC # BLD: 4.59 M/UL — SIGNIFICANT CHANGE UP (ref 4.2–5.8)
RBC # BLD: 4.59 M/UL — SIGNIFICANT CHANGE UP (ref 4.2–5.8)
RBC # FLD: 13.2 % — SIGNIFICANT CHANGE UP (ref 10.3–14.5)
RBC # FLD: 13.2 % — SIGNIFICANT CHANGE UP (ref 10.3–14.5)
S AUREUS DNA NOSE QL NAA+PROBE: SIGNIFICANT CHANGE UP
S AUREUS DNA NOSE QL NAA+PROBE: SIGNIFICANT CHANGE UP
SODIUM SERPL-SCNC: 136 MMOL/L — SIGNIFICANT CHANGE UP (ref 135–145)
SODIUM SERPL-SCNC: 136 MMOL/L — SIGNIFICANT CHANGE UP (ref 135–145)
WBC # BLD: 14.73 K/UL — HIGH (ref 3.8–10.5)
WBC # BLD: 14.73 K/UL — HIGH (ref 3.8–10.5)
WBC # FLD AUTO: 14.73 K/UL — HIGH (ref 3.8–10.5)
WBC # FLD AUTO: 14.73 K/UL — HIGH (ref 3.8–10.5)

## 2023-12-29 PROCEDURE — 61510 CRNEC TREPH EXC BRN TUM STTL: CPT

## 2023-12-29 PROCEDURE — 70450 CT HEAD/BRAIN W/O DYE: CPT | Mod: 26

## 2023-12-29 PROCEDURE — 61781 SCAN PROC CRANIAL INTRA: CPT | Mod: 82

## 2023-12-29 PROCEDURE — 61781 SCAN PROC CRANIAL INTRA: CPT

## 2023-12-29 PROCEDURE — 93010 ELECTROCARDIOGRAM REPORT: CPT

## 2023-12-29 PROCEDURE — 88331 PATH CONSLTJ SURG 1 BLK 1SPC: CPT | Mod: 26

## 2023-12-29 PROCEDURE — 99291 CRITICAL CARE FIRST HOUR: CPT

## 2023-12-29 PROCEDURE — 88307 TISSUE EXAM BY PATHOLOGIST: CPT | Mod: 26

## 2023-12-29 PROCEDURE — 69990 MICROSURGERY ADD-ON: CPT | Mod: 82,59

## 2023-12-29 PROCEDURE — 69990 MICROSURGERY ADD-ON: CPT | Mod: 59

## 2023-12-29 PROCEDURE — 88334 PATH CONSLTJ SURG CYTO XM EA: CPT | Mod: 26,59

## 2023-12-29 PROCEDURE — 88341 IMHCHEM/IMCYTCHM EA ADD ANTB: CPT | Mod: 26

## 2023-12-29 PROCEDURE — 88342 IMHCHEM/IMCYTCHM 1ST ANTB: CPT | Mod: 26

## 2023-12-29 PROCEDURE — 61510 CRNEC TREPH EXC BRN TUM STTL: CPT | Mod: 82

## 2023-12-29 DEVICE — AVITENE: Type: IMPLANTABLE DEVICE | Status: FUNCTIONAL

## 2023-12-29 DEVICE — SCREW UN3 SLF DRILL 1.5X4MM MUST ORDER IN MULTIPLES OF 5: Type: IMPLANTABLE DEVICE | Status: FUNCTIONAL

## 2023-12-29 DEVICE — PLATE COVER BURRHOLE UN3 W/TAB 14MM: Type: IMPLANTABLE DEVICE | Status: FUNCTIONAL

## 2023-12-29 DEVICE — MAYFIELD SKULL PIN ADULT STEEL: Type: IMPLANTABLE DEVICE | Status: FUNCTIONAL

## 2023-12-29 DEVICE — SURGIFOAM PAD 8CM X 12.5CM X 10MM (100): Type: IMPLANTABLE DEVICE | Status: FUNCTIONAL

## 2023-12-29 DEVICE — KIT A-LINE 1LUM 20G X 12CM SAFE KIT: Type: IMPLANTABLE DEVICE | Status: FUNCTIONAL

## 2023-12-29 DEVICE — FLOSEAL FAST PREP 10ML: Type: IMPLANTABLE DEVICE | Status: FUNCTIONAL

## 2023-12-29 RX ORDER — HYDRALAZINE HCL 50 MG
10 TABLET ORAL
Refills: 0 | Status: DISCONTINUED | OUTPATIENT
Start: 2023-12-29 | End: 2023-12-31

## 2023-12-29 RX ORDER — SENNA PLUS 8.6 MG/1
2 TABLET ORAL AT BEDTIME
Refills: 0 | Status: DISCONTINUED | OUTPATIENT
Start: 2023-12-29 | End: 2023-12-31

## 2023-12-29 RX ORDER — LABETALOL HCL 100 MG
10 TABLET ORAL
Refills: 0 | Status: DISCONTINUED | OUTPATIENT
Start: 2023-12-29 | End: 2023-12-31

## 2023-12-29 RX ORDER — CEFAZOLIN SODIUM 1 G
2000 VIAL (EA) INJECTION ONCE
Refills: 0 | Status: DISCONTINUED | OUTPATIENT
Start: 2023-12-29 | End: 2023-12-29

## 2023-12-29 RX ORDER — SODIUM CHLORIDE 9 MG/ML
1000 INJECTION INTRAMUSCULAR; INTRAVENOUS; SUBCUTANEOUS
Refills: 0 | Status: DISCONTINUED | OUTPATIENT
Start: 2023-12-29 | End: 2023-12-29

## 2023-12-29 RX ORDER — PANTOPRAZOLE SODIUM 20 MG/1
40 TABLET, DELAYED RELEASE ORAL
Refills: 0 | Status: DISCONTINUED | OUTPATIENT
Start: 2023-12-29 | End: 2023-12-31

## 2023-12-29 RX ORDER — CEFAZOLIN SODIUM 1 G
2000 VIAL (EA) INJECTION EVERY 8 HOURS
Refills: 0 | Status: DISCONTINUED | OUTPATIENT
Start: 2023-12-29 | End: 2023-12-29

## 2023-12-29 RX ORDER — LEVETIRACETAM 250 MG/1
1000 TABLET, FILM COATED ORAL
Refills: 0 | Status: DISCONTINUED | OUTPATIENT
Start: 2023-12-29 | End: 2023-12-31

## 2023-12-29 RX ORDER — OXYCODONE HYDROCHLORIDE 5 MG/1
10 TABLET ORAL EVERY 4 HOURS
Refills: 0 | Status: DISCONTINUED | OUTPATIENT
Start: 2023-12-29 | End: 2023-12-31

## 2023-12-29 RX ORDER — POLYETHYLENE GLYCOL 3350 17 G/17G
17 POWDER, FOR SOLUTION ORAL DAILY
Refills: 0 | Status: DISCONTINUED | OUTPATIENT
Start: 2023-12-29 | End: 2023-12-29

## 2023-12-29 RX ORDER — AMINOLEVULINIC ACID HYDROCHLORIDE 1500 MG/1
1780 POWDER, FOR SOLUTION ORAL ONCE
Refills: 0 | Status: COMPLETED | OUTPATIENT
Start: 2023-12-29 | End: 2023-12-29

## 2023-12-29 RX ORDER — CEFAZOLIN SODIUM 1 G
2000 VIAL (EA) INJECTION EVERY 8 HOURS
Refills: 0 | Status: COMPLETED | OUTPATIENT
Start: 2023-12-29 | End: 2023-12-30

## 2023-12-29 RX ORDER — DEXTROSE 50 % IN WATER 50 %
25 SYRINGE (ML) INTRAVENOUS ONCE
Refills: 0 | Status: DISCONTINUED | OUTPATIENT
Start: 2023-12-29 | End: 2023-12-30

## 2023-12-29 RX ORDER — ACETAMINOPHEN 500 MG
650 TABLET ORAL EVERY 6 HOURS
Refills: 0 | Status: DISCONTINUED | OUTPATIENT
Start: 2023-12-29 | End: 2023-12-31

## 2023-12-29 RX ORDER — SODIUM CHLORIDE 9 MG/ML
1000 INJECTION, SOLUTION INTRAVENOUS
Refills: 0 | Status: DISCONTINUED | OUTPATIENT
Start: 2023-12-29 | End: 2023-12-30

## 2023-12-29 RX ORDER — CHLORHEXIDINE GLUCONATE 213 G/1000ML
1 SOLUTION TOPICAL DAILY
Refills: 0 | Status: DISCONTINUED | OUTPATIENT
Start: 2023-12-29 | End: 2023-12-31

## 2023-12-29 RX ORDER — SODIUM CHLORIDE 9 MG/ML
3 INJECTION INTRAMUSCULAR; INTRAVENOUS; SUBCUTANEOUS EVERY 8 HOURS
Refills: 0 | Status: DISCONTINUED | OUTPATIENT
Start: 2023-12-29 | End: 2023-12-29

## 2023-12-29 RX ORDER — DEXTROSE 50 % IN WATER 50 %
15 SYRINGE (ML) INTRAVENOUS ONCE
Refills: 0 | Status: DISCONTINUED | OUTPATIENT
Start: 2023-12-29 | End: 2023-12-30

## 2023-12-29 RX ORDER — INSULIN LISPRO 100/ML
VIAL (ML) SUBCUTANEOUS
Refills: 0 | Status: DISCONTINUED | OUTPATIENT
Start: 2023-12-29 | End: 2023-12-30

## 2023-12-29 RX ORDER — ACETAMINOPHEN 500 MG
1000 TABLET ORAL ONCE
Refills: 0 | Status: COMPLETED | OUTPATIENT
Start: 2023-12-29 | End: 2023-12-29

## 2023-12-29 RX ORDER — DEXAMETHASONE 0.5 MG/5ML
4 ELIXIR ORAL EVERY 6 HOURS
Refills: 0 | Status: DISCONTINUED | OUTPATIENT
Start: 2023-12-29 | End: 2023-12-31

## 2023-12-29 RX ORDER — SENNA PLUS 8.6 MG/1
2 TABLET ORAL AT BEDTIME
Refills: 0 | Status: DISCONTINUED | OUTPATIENT
Start: 2023-12-29 | End: 2023-12-29

## 2023-12-29 RX ORDER — BENZOCAINE AND MENTHOL 5; 1 G/100ML; G/100ML
1 LIQUID ORAL
Refills: 0 | Status: DISCONTINUED | OUTPATIENT
Start: 2023-12-29 | End: 2023-12-31

## 2023-12-29 RX ORDER — POLYETHYLENE GLYCOL 3350 17 G/17G
17 POWDER, FOR SOLUTION ORAL DAILY
Refills: 0 | Status: DISCONTINUED | OUTPATIENT
Start: 2023-12-29 | End: 2023-12-31

## 2023-12-29 RX ORDER — OXYCODONE HYDROCHLORIDE 5 MG/1
5 TABLET ORAL EVERY 4 HOURS
Refills: 0 | Status: DISCONTINUED | OUTPATIENT
Start: 2023-12-29 | End: 2023-12-31

## 2023-12-29 RX ORDER — LOSARTAN POTASSIUM 100 MG/1
1 TABLET, FILM COATED ORAL
Refills: 0 | DISCHARGE

## 2023-12-29 RX ORDER — GLUCAGON INJECTION, SOLUTION 0.5 MG/.1ML
1 INJECTION, SOLUTION SUBCUTANEOUS ONCE
Refills: 0 | Status: DISCONTINUED | OUTPATIENT
Start: 2023-12-29 | End: 2023-12-30

## 2023-12-29 RX ORDER — ACETAMINOPHEN 500 MG
975 TABLET ORAL ONCE
Refills: 0 | Status: COMPLETED | OUTPATIENT
Start: 2023-12-29 | End: 2023-12-29

## 2023-12-29 RX ADMIN — Medication 400 MILLIGRAM(S): at 20:02

## 2023-12-29 RX ADMIN — AMINOLEVULINIC ACID HYDROCHLORIDE 1780 MILLIGRAM(S): 1500 POWDER, FOR SOLUTION ORAL at 06:43

## 2023-12-29 RX ADMIN — SENNA PLUS 2 TABLET(S): 8.6 TABLET ORAL at 22:04

## 2023-12-29 RX ADMIN — BENZOCAINE AND MENTHOL 1 LOZENGE: 5; 1 LIQUID ORAL at 20:02

## 2023-12-29 RX ADMIN — Medication 2000 MILLIGRAM(S): at 18:53

## 2023-12-29 RX ADMIN — OXYCODONE HYDROCHLORIDE 10 MILLIGRAM(S): 5 TABLET ORAL at 16:02

## 2023-12-29 RX ADMIN — BENZOCAINE AND MENTHOL 1 LOZENGE: 5; 1 LIQUID ORAL at 23:30

## 2023-12-29 RX ADMIN — OXYCODONE HYDROCHLORIDE 10 MILLIGRAM(S): 5 TABLET ORAL at 17:00

## 2023-12-29 RX ADMIN — Medication 4 MILLIGRAM(S): at 17:07

## 2023-12-29 RX ADMIN — SODIUM CHLORIDE 3 MILLILITER(S): 9 INJECTION INTRAMUSCULAR; INTRAVENOUS; SUBCUTANEOUS at 06:39

## 2023-12-29 RX ADMIN — Medication 975 MILLIGRAM(S): at 06:29

## 2023-12-29 RX ADMIN — Medication 1000 MILLIGRAM(S): at 20:45

## 2023-12-29 NOTE — PROGRESS NOTE ADULT - ASSESSMENT
Assessment:   58M PMHx HTN, HLD, melanoma x3 (last resection 2022) originally presented to [facility] on 12/22/23 with 1 month of confusion, altered speech, L facial droop and HA. Patient was discharged and returned today for elective R craniotomy for tumor resection, now POD#0.     PLAN:  - Q1h neuro checks  - CTH in AM  - MRI w/wo during this admission  - 1 subgaleal hemovac drain to full suction; monitor output - plan to remove tomorrow   - s/p 5-ALA, remain in dark room x 48 hours (until 12/31 @ ~7am), due to photosensitivity  - Pain control prn, avoid oversedation  - Normotensive  - Keppra 1000mg BID  - Decadron 4q6h  - Protonix while taking steroids  - Bowel regimen: Senna, Miralax  - Ancef x 3 doses  - VTE prophylaxis: SCDs, hold chemoprophylaxis due to post-op bleeding risk. May start chemical ppx tomorrow night (12/30)  - Activity: PT, OT  - Further medical management per NSICU  - Discussed case with Dr. Deleon Assessment:   58M PMHx HTN, HLD, melanoma x3 (last resection 2022) originally presented to [facility] on 12/22/23 with 1 month of confusion, altered speech, L facial droop and HA. Patient was discharged and returned today for elective R craniotomy for tumor resection, now POD#0.     PLAN:  - Q1h neuro checks  - CTH in AM  - MRI w/wo during this admission  - 1 subgaleal hemovac drain to full suction; monitor output - plan to remove tomorrow   - s/p 5-ALA, remain in dark room x 48 hours (until 12/31 @ ~7am), due to photosensitivity  - Pain control prn, avoid oversedation  - Normotensive  - Keppra 1000mg BID  - Decadron 4q6h  - Protonix while taking steroids  - Bowel regimen: Senna, Miralax  - Ancef x 3 doses  - VTE prophylaxis: SCDs, hold chemoprophylaxis due to post-op bleeding risk. May start chemical ppx tomorrow night (12/30)  - Activity: PT, OT  - Further medical management per NSICU  - Discussed case with Dr. Deleon Assessment:   58M PMHx HTN, HLD, melanoma x3 (last resection 2022) originally presented to [facility] on 12/22/23 with 1 month of confusion, altered speech, L facial droop and HA. Patient was discharged and returned today for elective R craniotomy for tumor resection, now POD#0.     PLAN:  - Q1h neuro checks  - CTH in AM  - MRI w/wo during this admission  - 1 subgaleal hemovac drain to full suction; monitor output - plan to remove tomorrow   - s/p 5-ALA, remain in dark room x 48 hours (until 12/31 @ ~7am), due to photosensitivity  - Pain control prn, avoid oversedation  - Normotensive  - Keppra 1000mg BID  - Decadron 4q6h  - Protonix while taking steroids  - CLD overnight, diet tomorrow if exam ok in morning  - Bowel regimen: Senna, Miralax  - Ancef x 3 doses  - VTE prophylaxis: SCDs, hold chemoprophylaxis due to post-op bleeding risk. May start chemical ppx tomorrow night (12/30)  - Activity: PT, OT  - Further medical management per NSICU  - Discussed case with Dr. Deleon Assessment:   58M PMHx HTN, HLD, melanoma x3 (last resection 2022) originally presented to [facility] on 12/22/23 with 1 month of confusion, altered speech, L facial droop and HA. Patient was discharged and returned today for elective R craniotomy for tumor resection, now POD#0.     PLAN:  - Q1h neuro checks  - CTH in AM  - MRI w/wo during this admission  - 1 subgaleal hemovac drain to full suction; monitor output - plan to remove tomorrow   - s/p 5-ALA, remain in dark room x 48 hours (until 12/31 @ ~7am), due to photosensitivity  - Pain control prn, avoid oversedation  - Normotensive  - Keppra 1000mg BID  - Decadron 4q6h  - Protonix while taking steroids  - CLD overnight, diet tomorrow if exam ok in morning  - Bowel regimen: Senna, Miralax  - Ancef x 3 doses  - VTE prophylaxis: SCDs, hold chemoprophylaxis due to post-op bleeding risk. May start chemical ppx tomorrow night (12/30)  - Activity: PT, OT  - Further medical management per NSICU  - Discussed case with Dr. Deleon

## 2023-12-29 NOTE — H&P ADULT - NS ATTEND AMEND GEN_ALL_CORE FT
I agree with the above. I personally examined and saw the patient. Left facial droop, otherwise oriented and following commands, no pronator drift, full strength throughout. OR today.

## 2023-12-29 NOTE — H&P ADULT - ASSESSMENT
58M with PMHx of  HTN, HLD, melanoma x3 who presented with 1 month of confusion, altered speech, facial droop, and HA. Patient presented to [facility] [place], CTH showed  4.6x4.2 cm R frontal mass w/ additional  3.3x1.6cm adjacent lesion. Patient transferred to [facility] for further neurosurgical w/u and evaluation. Pt now returned for elective procedure and is s/p R craniotomy for tumor resection.    PLAN:    - Q1h neuro checks  - CTH overnight  - MRB to be done whenever possible  - STAT CTH for any changes in neuro exam  - Got 5-ALA at 7am for proceudre; must remain in dark environment for 48 hours from administration (until 12/31/23 @7am)  - Pain control prn, avoid oversedation  - -160  - Keppra 1g BID until f/u w/ Dr. Deleon  - Decadron 4Q6  - Regular diet  - Bowel regimen: senna, miralax  - VTE prophylaxis: SCDs; start Lovenox tomorrow night (12/30/23)  - Activity: OOB as tolerated   - Further medical management per NSICU  - Discussed case with Dr. Deleon 58M with PMHx of  HTN, HLD, melanoma x3 who presented with 1 month of confusion, altered speech, facial droop, and HA. Patient presented to [facility] [place], CTH showed  4.6x4.2 cm R frontal mass w/ additional  3.3x1.6cm adjacent lesion. Patient transferred to [facility] for further neurosurgical w/u and evaluation. Pt now returned for elective procedure and is s/p R craniotomy for tumor resection.    PLAN:    - Q1h neuro checks  - CTH overnight  - MRB to be done whenever possible  - STAT CTH for any changes in neuro exam  - Got 5-ALA at 7am for proceudre; must remain in dark environment for 48 hours from administration (until 12/31/23 @7am)  - Pain control prn, avoid oversedation  - -160  - Keppra 1g BID until f/u w/ Dr. Deleon  - Decadron 4Q6  - Regular diet  - Bowel regimen: senna, miralax  - VTE prophylaxis: SCDs; start Lovenox tomorrow night (12/30/23)  - Activity: OOB as tolerated   - Further medical management per NSICU  - Discussed case with Dr. Deleon 58M with PMHx of  HTN, HLD, melanoma x3 who presented with 1 month of confusion, altered speech, facial droop, and HA. Patient presented to [facility] [place], CTH showed  4.6x4.2 cm R frontal mass w/ additional  3.3x1.6cm adjacent lesion. Patient transferred to [facility] for further neurosurgical w/u and evaluation. Pt now returned for elective R craniotomy for tumor resection.    PLAN:    - Q1h neuro checks after proccedure  - CTH overnight  - MRB to be done whenever possible  - STAT CTH for any changes in neuro exam  - Will get  5-ALA at 7am for proceudre; must remain in dark environment for 48 hours from administration (until 12/31/23 @7am)  - Pain control prn, avoid oversedation  - -160  - Keppra 1g BID until f/u w/ Dr. Deleon  - Decadron 4Q6  - Regular diet  - Bowel regimen: senna, miralax  - VTE prophylaxis: SCDs; start Lovenox tomorrow night (12/30/23)  - Activity: OOB as tolerated   - Further medical management per NSICU  - Discussed case with Dr. Deleon 58M with PMHx of  HTN, HLD, melanoma x3 who presented with 1 month of confusion, altered speech, facial droop, and HA. Patient presented to [facility] [place], CTH showed  4.6x4.2 cm R frontal mass w/ additional  3.3x1.6cm adjacent lesion. Patient transferred to [facility] for further neurosurgical w/u and evaluation. Pt now returned for elective R craniotomy for tumor resection.    PLAN:    - Q1h neuro checks after proccedure  - CTH overnight  - MRB to be done whenever possible  - STAT CTH for any changes in neuro exam  - Will get  5-ALA at 7am for proceudre; must remain in dark environment for 48 hours from administration (until 12/31/23 @7am)  - Pain control prn, avoid oversedation  - -160  - Keppra 1g BID until f/u w/ Dr. Deleon  - Decadron 4Q6  - Regular diet  - Bowel regimen: senna, miralax  - VTE prophylaxis: SCDs; start Lovenox tomorrow night (12/30/23)  - Activity: OOB as tolerated   - Further medical management per NSICU  - Discussed case with Dr. Deleon 58M with PMHx of  HTN, HLD, melanoma x3 who presented with 1 month of confusion, altered speech, facial droop, and HA. Patient presented to [facility] [place], CTH showed  4.6x4.2 cm R frontal mass w/ additional  3.3x1.6cm adjacent lesion. Patient transferred to [facility] for further neurosurgical w/u and evaluation. Pt now returned for elective R craniotomy for tumor resection.    PLAN:  - Q1h neuro checks after proccedure  - CTH overnight  - MRB to be done whenever possible  - STAT CTH for any changes in neuro exam  - Will get  5-ALA at 7am for proceudre; must remain in dark environment for 48 hours from administration (until 12/31/23 @7am)  - Pain control prn, avoid oversedation  - -160  - Keppra 1g BID until f/u w/ Dr. Deleon  - Decadron 4Q6 postop, PPI, insulin  - NPO/IVF  - Bowel regimen: senna, miralax  - VTE prophylaxis: SCDs; start Lovenox tomorrow night (12/30/23)  - Activity: OOB as tolerated   - Further medical management per NSICU  - Discussed case with Dr. Deleon 58M with PMHx of  HTN, HLD, melanoma x3 who presented with 1 month of confusion, altered speech, facial droop, and HA. [place] [facility] [place] showed  4.6x4.2 cm R frontal mass w/ additional  3.3x1.6cm adjacent lesion. Patient transferred to [facility] for further neurosurgical w/u and evaluation. Pt now returned for elective R craniotomy for tumor resection.    PLAN:  - Q1h neuro checks after proccedure  - CTH overnight  - MRB to be done whenever possible  - STAT CTH for any changes in neuro exam  - Will get  5-ALA at 7am for proceudre; must remain in dark environment for 48 hours from administration (until 12/31/23 @7am)  - Pain control prn, avoid oversedation  - -160  - Keppra 1g BID until f/u w/ Dr. Deleon  - Decadron 4Q6 postop, PPI, insulin  - NPO/IVF  - Bowel regimen: senna, miralax  - VTE prophylaxis: SCDs; start Lovenox tomorrow night (12/30/23)  - Activity: OOB as tolerated   - Further medical management per NSICU  - Discussed case with Dr. Deleon

## 2023-12-29 NOTE — BRIEF OPERATIVE NOTE - COMMENTS
Neuromonitoring at baseline at conclusion of case Neuromonitoring at baseline at conclusion of case.

## 2023-12-29 NOTE — PATIENT PROFILE ADULT - FALL HARM RISK - UNIVERSAL INTERVENTIONS
Bed in lowest position, wheels locked, appropriate side rails in place/Call bell, personal items and telephone in reach/Instruct patient to call for assistance before getting out of bed or chair/Non-slip footwear when patient is out of bed/[place] to call system/Physically safe environment - no spills, clutter or unnecessary equipment/Purposeful Proactive Rounding/Room/bathroom lighting operational, light cord in reach Bed in lowest position, wheels locked, appropriate side rails in place/Call bell, personal items and telephone in reach/Instruct patient to call for assistance before getting out of bed or chair/Non-slip footwear when patient is out of bed/[place] to call system/Physically safe environment - no spills, clutter or unnecessary equipment/Purposeful Proactive Rounding/Room/bathroom lighting operational, light cord in reach

## 2023-12-29 NOTE — H&P ADULT - NSHPLABSRESULTS_GEN_ALL_CORE
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 Larger zone FLAIR signal involves basal ganglia, insula and   further sites of frontal and temporal lobes.    Likely diagnosis is high-grade glioma; correlate with surgical pathology.    < from: CT Abdomen and Pelvis w/ IV Cont (12.22.23 @ 17:27) >    IMPRESSION:  1.  No findings to suggest primary malignancy or metastatic disease in   the chest, abdomen or pelvis.    2.  Nonspecific 5 mm left lower lobe pulmonary nodule. Additional   calcified nodules in the left lower lobe lung likely postinfectious or   inflammatory.    3.  Diffuse bladder wall thickening and trabeculation. Findings could be   related to bladder outlet obstruction the setting of prostatomegaly or   cystitis. Correlate with urinalysis.

## 2023-12-29 NOTE — CONSULT NOTE ADULT - SUBJECTIVE AND OBJECTIVE BOX
HPI: 58M with PMHx of HTN, HLD, melanoma x3 last resection 1 year ago never requiring systemic treatment who presented today after being altered, L facial droop, and HA. Per patient, his children have noticed him being "off" for the past 1 month with intermittent confusion and altered speech while talking with him on the phone. Yesterday, patient flew from [place] to visit family, and his children confirmed altered speech and facial droop which prompted patient to visit [facility] [place] this [place]. Patient admits to severe HA 10/10 currently, has tried using extra strength tylenol without relief, and nausea this am now resolved. Patient also admits to worsening vision / blurred vision x1 month. Patient denies weakness, numbness, tingling, difficulty walking, weight loss, CP, SOB, N/V. CTH at [facility] showed 4.6x4.2 cm R frontal mass w/ additional  3.3x1.6cm adjacent lesion. Pt was transferred to [facility] for further neurosurgical evaluation. He had a CT head that showed right frontal abnormal lesion 4.6 x 4.2 cm + R lenticular nucleus 3.3 x 1.6 cm lesion.  He also had a MRI of the brain.  Patient followed up as an outpatient on 12/27/2023 and was scheduled for surgery on 12/29/23.   Patient is now s/p R craniotomy for tumor resection. NSICU consulted for close monitoring of pt s/p elective procedure and Q1 neurochecks.    PAST MEDICAL & SURGICAL HISTORY:  Melanoma  Hypertension  HLD (hyperlipidemia)  S/P rotator cuff repair    FAMILY HISTORY:  No pertinent family history in first degree relatives    SOCIAL HISTORY:  Tobacco Use: Denies   EtOH use: Former ETOH abuse, quit 1 year ago  Substance: THC gummies otherwise none    Allergies    No Known Allergies  Intolerances    REVIEW OF SYSTEMS  As noted in HPI    HOME MEDICATIONS:  Home Medications:  acetaminophen 325 mg oral tablet: 2 tab(s) orally every 6 hours As needed Temp greater or equal to 38C (100.4F), Mild Pain (1 - 3) (29 Dec 2023 05:48)  hydroCHLOROthiazide 25 mg oral tablet: 1 tab(s) orally once a day (29 Dec 2023 05:48)  Multiple Vitamins oral tablet: 1 tab(s) orally once a day (29 Dec 2023 05:48)    Neuro:  acetaminophen     Tablet .. 650 milliGRAM(s) Oral every 6 hours PRN  levETIRAcetam 1000 milliGRAM(s) Oral two times a day  oxyCODONE    IR 10 milliGRAM(s) Oral every 4 hours PRN  oxyCODONE    IR 5 milliGRAM(s) Oral every 4 hours PRN    OTHER:  dexAMETHasone     Tablet 4 milliGRAM(s) Oral every 6 hours  dextrose 50% Injectable 25 Gram(s) IV Push once  dextrose Oral Gel 15 Gram(s) Oral once PRN  glucagon  Injectable 1 milliGRAM(s) IntraMuscular once  insulin lispro (ADMELOG) corrective regimen sliding scale   SubCutaneous three times a day before meals  pantoprazole    Tablet 40 milliGRAM(s) Oral before breakfast  polyethylene glycol 3350 17 Gram(s) Oral daily  senna 2 Tablet(s) Oral at bedtime    IVF:  dextrose 5%. 1000 milliLiter(s) IV Continuous <Continuous>    Vital Signs Last 24 Hrs  T(C): 36.7 (29 Dec 2023 13:05), Max: 36.7 (29 Dec 2023 13:05)  T(F): 98 (29 Dec 2023 13:05), Max: 98 (29 Dec 2023 13:05)  HR: 75 (29 Dec 2023 13:30) (66 - 79)  BP: 135/76 (29 Dec 2023 13:30) (113/95 - 139/74)  BP(mean): 92 (29 Dec 2023 13:30) (91 - 92)  RR: 13 (29 Dec 2023 13:30) (13 - 19)  SpO2: 98% (29 Dec 2023 13:30) (98% - 99%)    Parameters below as of 29 Dec 2023 13:05  Patient On (Oxygen Delivery Method): nasal cannula  O2 Flow (L/min): 3    PHYSICAL EXAM:  CONSTITUTIONAL: Well groomed, no apparent distress   EYES: PERRLA and symmetric, EOMI, No conjunctival or scleral injection, non-icteric   ENMT: Oral mucosa with moist membranes. Normal dentition   NECK: Supple, symmetric and without tracheal deviation    RESP: No respiratory distress   CV:No JVD; no peripheral edema   NEURO:   - Mental Status:  Alert, awake, oriented to person, place, and time; Speech is fluent though very mild dysarthria. Follows commands well.  Insight and knowledge appear appropriate.    -Cranial Nerves II-XII:      II:  Visual acuity is normal for age ; Visual fields are full to confrontation; Pupils are equal, round, and reactive to light.    III, IV, VI:  Extraocular movements are intact without nystagmus.    V:  Facial sensation is intact in the V1-V3 distribution bilaterally.    VII:  Face is symmetric with normal eye closure and smile    VIII:  Hearing is grossly intact    IX, X, XII:  speech is clear    XI:  Head turning and shoulder shrug are intact.    - Motor:  Strength is 5/5 in b/l UEs and LEs.  There is no pronator drift.    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 Larger zone FLAIR signal involves basal ganglia, insula and   further sites of frontal and temporal lobes.    Likely diagnosis is high-grade glioma; correlate with surgical pathology.    < from: CT Abdomen and Pelvis w/ IV Cont (12.22.23 @ 17:27) >    IMPRESSION:  1.  No findings to suggest primary malignancy or metastatic disease in   the chest, abdomen or pelvis.    2.  Nonspecific 5 mm left lower lobe pulmonary nodule. Additional   calcified nodules in the left lower lobe lung likely postinfectious or   inflammatory.    3.  Diffuse bladder wall thickening and trabeculation. Findings could be   related to bladder outlet obstruction the setting of prostatomegaly or   cystitis. Correlate with urinalysis. HPI: 58M with PMHx of HTN, HLD, melanoma x3 last resection 1 year ago never requiring systemic treatment who presented today after being altered, L facial droop, and HA. Per patient, his children have noticed him being "off" for the past 1 month with intermittent confusion and altered speech while talking with him on the phone. Yesterday, patient flew from [place] to visit family, and his children confirmed altered speech and facial droop which prompted patient to visit [facility] [place]. Patient admits to severe HA 10/10 currently, has tried using extra strength tylenol without relief, and nausea this am now resolved. Patient also admits to worsening vision / blurred vision x1 month. Patient denies weakness, numbness, tingling, difficulty walking, weight loss, CP, SOB, N/V. CTH at [facility] [place] showed 4.6x4.2 cm R frontal mass w/ additional  3.3x1.6cm adjacent lesion. Pt was transferred to [facility] for further neurosurgical evaluation. He had a CT head that showed right frontal abnormal lesion 4.6 x 4.2 cm + R lenticular nucleus 3.3 x 1.6 cm lesion.  He also had a MRI of the brain.  Patient followed up as an outpatient on 12/27/2023 and was scheduled for surgery on 12/29/23.   Patient is now s/p R craniotomy for tumor resection. NSICU consulted for close monitoring of pt s/p elective procedure and Q1 neurochecks.    PAST MEDICAL & SURGICAL HISTORY:  Melanoma  Hypertension  HLD (hyperlipidemia)  S/P rotator cuff repair    FAMILY HISTORY:  No pertinent family history in first degree relatives    SOCIAL HISTORY:  Tobacco Use: Denies   EtOH use: Former ETOH abuse, quit 1 year ago  Substance: THC gummies otherwise none    Allergies    No Known Allergies  Intolerances    REVIEW OF SYSTEMS  As noted in HPI    HOME MEDICATIONS:  Home Medications:  acetaminophen 325 mg oral tablet: 2 tab(s) orally every 6 hours As needed Temp greater or equal to 38C (100.4F), Mild Pain (1 - 3) (29 Dec 2023 05:48)  hydroCHLOROthiazide 25 mg oral tablet: 1 tab(s) orally once a day (29 Dec 2023 05:48)  Multiple Vitamins oral tablet: 1 tab(s) orally once a day (29 Dec 2023 05:48)    Neuro:  acetaminophen     Tablet .. 650 milliGRAM(s) Oral every 6 hours PRN  levETIRAcetam 1000 milliGRAM(s) Oral two times a day  oxyCODONE    IR 10 milliGRAM(s) Oral every 4 hours PRN  oxyCODONE    IR 5 milliGRAM(s) Oral every 4 hours PRN    OTHER:  dexAMETHasone     Tablet 4 milliGRAM(s) Oral every 6 hours  dextrose 50% Injectable 25 Gram(s) IV Push once  dextrose Oral Gel 15 Gram(s) Oral once PRN  glucagon  Injectable 1 milliGRAM(s) IntraMuscular once  insulin lispro (ADMELOG) corrective regimen sliding scale   SubCutaneous three times a day before meals  pantoprazole    Tablet 40 milliGRAM(s) Oral before breakfast  polyethylene glycol 3350 17 Gram(s) Oral daily  senna 2 Tablet(s) Oral at bedtime    IVF:  dextrose 5%. 1000 milliLiter(s) IV Continuous <Continuous>    Vital Signs Last 24 Hrs  T(C): 36.7 (29 Dec 2023 13:05), Max: 36.7 (29 Dec 2023 13:05)  T(F): 98 (29 Dec 2023 13:05), Max: 98 (29 Dec 2023 13:05)  HR: 75 (29 Dec 2023 13:30) (66 - 79)  BP: 135/76 (29 Dec 2023 13:30) (113/95 - 139/74)  BP(mean): 92 (29 Dec 2023 13:30) (91 - 92)  RR: 13 (29 Dec 2023 13:30) (13 - 19)  SpO2: 98% (29 Dec 2023 13:30) (98% - 99%)    Parameters below as of 29 Dec 2023 13:05  Patient On (Oxygen Delivery Method): nasal cannula  O2 Flow (L/min): 3    PHYSICAL EXAM:  CONSTITUTIONAL: Well groomed, no apparent distress   EYES: PERRLA and symmetric, EOMI, No conjunctival or scleral injection, non-icteric   ENMT: Oral mucosa with moist membranes. Normal dentition   NECK: Supple, symmetric and without tracheal deviation    RESP: No respiratory distress   CV:No JVD; no peripheral edema   NEURO:   - Mental Status:  Alert, awake, oriented to person, place, and time; Speech is fluent though very mild dysarthria. Follows commands well.  Insight and knowledge appear appropriate.    -Cranial Nerves II-XII:      II:  Visual acuity is normal for age ; Visual fields are full to confrontation; Pupils are equal, round, and reactive to light.    III, IV, VI:  Extraocular movements are intact without nystagmus.    V:  Facial sensation is intact in the V1-V3 distribution bilaterally.    VII:  Face is symmetric with normal eye closure and smile    VIII:  Hearing is grossly intact    IX, X, XII:  speech is clear    XI:  Head turning and shoulder shrug are intact.    - Motor:  Strength is 5/5 in b/l UEs and LEs.  There is no pronator drift.    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 Larger zone FLAIR signal involves basal ganglia, insula and   further sites of frontal and temporal lobes.    Likely diagnosis is high-grade glioma; correlate with surgical pathology.    < from: CT Abdomen and Pelvis w/ IV Cont (12.22.23 @ 17:27) >    IMPRESSION:  1.  No findings to suggest primary malignancy or metastatic disease in   the chest, abdomen or pelvis.    2.  Nonspecific 5 mm left lower lobe pulmonary nodule. Additional   calcified nodules in the left lower lobe lung likely postinfectious or   inflammatory.    3.  Diffuse bladder wall thickening and trabeculation. Findings could be   related to bladder outlet obstruction the setting of prostatomegaly or   cystitis. Correlate with urinalysis.

## 2023-12-29 NOTE — PROGRESS NOTE ADULT - SUBJECTIVE AND OBJECTIVE BOX
POST-OPERATIVE NOTE    Procedure: R craniotomy for tumor resection w/ 5-ALA    Diagnosis/Indication: R frontal brain tumor    Surgeon: Dr. Tracie Deleon    INTERVAL HPI/ACUTE EVENTS:  59yo male s/p R craniotomy for brain tumor resection, now POD#0. Patient seen lying comfortably in bed. Report dull ache (3/10) at incision site. Denies weakness, numbness, n/v.    VITALS:  T(C): 36.7 (12-29-23 @ 13:05), Max: 36.7 (12-29-23 @ 13:05)  HR: 73 (12-29-23 @ 15:00) (66 - 79)  BP: 147/93 (12-29-23 @ 15:00) (113/95 - 148/94)  RR: 20 (12-29-23 @ 15:00) (13 - 20)  SpO2: 96% (12-29-23 @ 15:00) (96% - 99%)  Wt(kg): --    PHYSICAL EXAM:  GENERAL: NAD, well-groomed  HEAD:  S/p R crani. Head wrap w/ kerlex in place  DRAINS: Subgaleal hemovac to full suction  WOUND: Dressing clean dry intact  CECILY COMA SCORE: E-4 V-5 M-6 = 15  MENTAL STATUS: AAO x3; Awake; Opens eyes spontaneously; Appropriately conversant without aphasia; following simple commands  CRANIAL NERVES: PERRL, EOMI without nystagmus. Facial sensation intact V1-3 distribution b/l. Face grossly symmetric. Tongue midline. Hearing grossly intact. Speech clear.    MOTOR: strength 5/5 b/l upper and lower extremities  SENSATION: grossly intact to light touch all extremities  COORDINATION: Gait not assessed  CHEST/LUNG: Non-labored breathing  EXTREMITIES: no clubbing, cyanosis, or edema  SKIN: Warm, dry    LABS:                        13.8   14.73 )-----------( 297      ( 29 Dec 2023 15:10 )             39.9       RADIOLOGY/OTHER:    < from: MR Brain Stereotactic w/wo IV Cont (12.22.23 @ 18:41) >    There is a heterogeneous mass lesion in the right frontal lobe, centered   in white matter/periventricular location with central necrosis and   irregular peripheral enhancement. This measures 3.8 x 3.9 x 2.6 cm   (transverse x AP x CC; series 6, image 95). Beyond the dominant mass   there are satellite foci of enhancement at its posterior aspect   (measuring up to 6 mm on image 100, and there are separate nodular and   minimally necrotic nodules at the right insula, measuring up to 11 mm on   image 107. Inferior to the dominant nodule is a cystic area with thinner   peripheral enhancement, cyst examination measuring 2.8 x 3.6 x 1.5 cm.   Lateral to this there is linear plaque-like multifocal enhancement (image   116) suspicious for local leptomeningeal deposition.    Larger zone of masslike and nonenhancing FLAIR signal diffusely involves   the right basal ganglia, as well as cortical sites in the right inferior   frontal lobe,the entirety of the insula, and a portion of the anterior   and central temporal lobe. Minimal extension across midline at the   hypothalamus level is suggested with tumor signal at right optic tract   level and equivocal at anterior commissure. No extension through corpus   callosum.    Considerable mass effect includes effacement of the right lateral   ventricle as well as the third ventricle. Right temporal horn is effaced   from nonenhancing tumor but not dilated or entrapment. Midline shift to   the left at septum pellucidum level is measured at 8 mm there is similar   to the recent CT. No transtentorial downward herniation.    No additional mass or signal abnormality in the left cerebral hemisphere,   the brainstem or either cerebellar hemisphere.    Diffusion imaging is negative for recent infarct. Peripheral hemorrhage   signal is identified in the dominant mass and minimally at the inferior   cystic focus.    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 Larger zone FLAIR signal involves basal ganglia, insula and   further sites of frontal and temporal lobes.    Likely diagnosis is high-grade glioma; correlate with surgical pathology.    --- End of Report ---    < from: CT Head No Cont (12.29.23 @ 06:19) >  There is a cystic lesion in the region of the right caudate head which   does not demonstrate diffusion restriction on MRI or thin rim   enhancement, therefore not likely being an abscess. On CT this measures   4.1 cm in AP diameter by 4.2 cm transversely by 4.6 cmin craniocaudal   diameter and appears bilobed on the coronal imaging. There is mass effect   on the frontal horn of the right lateral ventricle and midline shift to   the left. There is a small amount of vasogenic edema. This likely   represents a neoplasm and may represent a primary or secondary neoplasm   based on its appearance. There is no hydrocephalus.      Impression: Cystic appearing right caudate head mass with mass effect and   vasogenic edema. Mild midline shift to the left likely represents a   neoplasm, primary or secondary. No significant change since 12/22/2023.    --- End of Report ---     POST-OPERATIVE NOTE    Procedure: R craniotomy for tumor resection w/ 5-ALA    Diagnosis/Indication: R frontal brain tumor    Surgeon: Dr. Tracie Deleon    INTERVAL HPI/ACUTE EVENTS:  57yo male s/p R craniotomy for brain tumor resection, now POD#0. Patient seen lying comfortably in bed. Report dull ache (3/10) at incision site. Denies weakness, numbness, n/v.    VITALS:  T(C): 36.7 (12-29-23 @ 13:05), Max: 36.7 (12-29-23 @ 13:05)  HR: 73 (12-29-23 @ 15:00) (66 - 79)  BP: 147/93 (12-29-23 @ 15:00) (113/95 - 148/94)  RR: 20 (12-29-23 @ 15:00) (13 - 20)  SpO2: 96% (12-29-23 @ 15:00) (96% - 99%)  Wt(kg): --    PHYSICAL EXAM:  GENERAL: NAD, well-groomed  HEAD:  S/p R crani. Head wrap w/ kerlex in place  DRAINS: Subgaleal hemovac to full suction  WOUND: Dressing clean dry intact  CECILY COMA SCORE: E-4 V-5 M-6 = 15  MENTAL STATUS: AAO x3; Awake; Opens eyes spontaneously; Appropriately conversant without aphasia; following simple commands  CRANIAL NERVES: PERRL, EOMI without nystagmus. Facial sensation intact V1-3 distribution b/l. Face grossly symmetric. Tongue midline. Hearing grossly intact. Speech clear.    MOTOR: strength 5/5 b/l upper and lower extremities  SENSATION: grossly intact to light touch all extremities  COORDINATION: Gait not assessed  CHEST/LUNG: Non-labored breathing  EXTREMITIES: no clubbing, cyanosis, or edema  SKIN: Warm, dry    LABS:                        13.8   14.73 )-----------( 297      ( 29 Dec 2023 15:10 )             39.9       RADIOLOGY/OTHER:    < from: MR Brain Stereotactic w/wo IV Cont (12.22.23 @ 18:41) >    There is a heterogeneous mass lesion in the right frontal lobe, centered   in white matter/periventricular location with central necrosis and   irregular peripheral enhancement. This measures 3.8 x 3.9 x 2.6 cm   (transverse x AP x CC; series 6, image 95). Beyond the dominant mass   there are satellite foci of enhancement at its posterior aspect   (measuring up to 6 mm on image 100, and there are separate nodular and   minimally necrotic nodules at the right insula, measuring up to 11 mm on   image 107. Inferior to the dominant nodule is a cystic area with thinner   peripheral enhancement, cyst examination measuring 2.8 x 3.6 x 1.5 cm.   Lateral to this there is linear plaque-like multifocal enhancement (image   116) suspicious for local leptomeningeal deposition.    Larger zone of masslike and nonenhancing FLAIR signal diffusely involves   the right basal ganglia, as well as cortical sites in the right inferior   frontal lobe,the entirety of the insula, and a portion of the anterior   and central temporal lobe. Minimal extension across midline at the   hypothalamus level is suggested with tumor signal at right optic tract   level and equivocal at anterior commissure. No extension through corpus   callosum.    Considerable mass effect includes effacement of the right lateral   ventricle as well as the third ventricle. Right temporal horn is effaced   from nonenhancing tumor but not dilated or entrapment. Midline shift to   the left at septum pellucidum level is measured at 8 mm there is similar   to the recent CT. No transtentorial downward herniation.    No additional mass or signal abnormality in the left cerebral hemisphere,   the brainstem or either cerebellar hemisphere.    Diffusion imaging is negative for recent infarct. Peripheral hemorrhage   signal is identified in the dominant mass and minimally at the inferior   cystic focus.    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 Larger zone FLAIR signal involves basal ganglia, insula and   further sites of frontal and temporal lobes.    Likely diagnosis is high-grade glioma; correlate with surgical pathology.    --- End of Report ---    < from: CT Head No Cont (12.29.23 @ 06:19) >  There is a cystic lesion in the region of the right caudate head which   does not demonstrate diffusion restriction on MRI or thin rim   enhancement, therefore not likely being an abscess. On CT this measures   4.1 cm in AP diameter by 4.2 cm transversely by 4.6 cmin craniocaudal   diameter and appears bilobed on the coronal imaging. There is mass effect   on the frontal horn of the right lateral ventricle and midline shift to   the left. There is a small amount of vasogenic edema. This likely   represents a neoplasm and may represent a primary or secondary neoplasm   based on its appearance. There is no hydrocephalus.      Impression: Cystic appearing right caudate head mass with mass effect and   vasogenic edema. Mild midline shift to the left likely represents a   neoplasm, primary or secondary. No significant change since 12/22/2023.    --- End of Report ---

## 2023-12-29 NOTE — H&P ADULT - HISTORY OF PRESENT ILLNESS
58M with PMHx of HTN, HLD, melanoma x3 last resection 1 year ago never requiring systemic treatment who presented today after being altered, L facial droop, and HA. Per patient, his children have noticed him being "off" for the past 1 month with intermittent confusion and altered speech while talking with him on the phone. Yesterday, patient flew from [place] to visit family, and his children confirmed altered speech and facial droop which prompted [place] [facility] [place]. Patient admits to severe HA 10/10 currently, has tried using extra strength tylenol without relief, and nausea this am now resolved. Patient also admits to worsening vision / blurred vision x1 month. Patient denies weakness, numbness, tingling, difficulty walking, weight loss, CP, SOB, N/V. [place] [facility] [place] 4.6x4.2 cm R frontal mass w/ additional  3.3x1.6cm adjacent lesion. Pt was transferred to [facility] for further neurosurgical evaluation. He had a CT head that showed right frontal abnormal lesion 4.6 x 4.2 cm + R lenticular nucleus 3.3 x 1.6 cm lesion.  He also had a MRI of the brain.  Patient followed up as an outpatient on 12/27/2023 and was scheduled for surgery on 12/29/23. 58M with PMHx of HTN, HLD, melanoma x3 last resection 1 year ago never requiring systemic treatment who presented today after being altered, L facial droop, and HA. Per patient, his children have noticed him being "off" for the past 1 month with intermittent confusion and altered speech while talking with him on the phone. Yesterday, patient flew from [place] to visit family, and his children confirmed altered speech and facial droop which prompted patient to [place] [facility] [place]. Patient admits to severe HA 10/10 currently, has tried using extra strength tylenol without relief, and nausea this am now resolved. Patient also admits to worsening vision / blurred vision x1 month. Patient denies weakness, numbness, tingling, difficulty walking, weight loss, CP, SOB, N/V. [place] [facility] [place] 4.6x4.2 cm R frontal mass w/ additional  3.3x1.6cm adjacent lesion. Pt was transferred to [facility] for further neurosurgical evaluation. He had a CT head that showed right frontal abnormal lesion 4.6 x 4.2 cm + R lenticular nucleus 3.3 x 1.6 cm lesion.  He also had a MRI of the brain.  Patient followed up as an outpatient on 12/27/2023 and was scheduled for surgery on 12/29/23.

## 2023-12-29 NOTE — PATIENT PROFILE ADULT - NSPROMEDSBROUGHTTOHOSP_GEN_A_NUR
ITP ASSESSMENT   Assessment Day: 60 Day    Session Number: 15  Precautions: Sternal/ V-tach    Diagnosis: CAB    Risk Stratification: High    Referring Provider: Uriel BOUDREAUX  Exercise Assessment: Reassessment    Pt is tolerating 40 min of exercise without sx's. Met level was 2.4. Pt did had a 11 sec run of V-tach,  NSTEMI and MONROE x 3 on 4-17-19.                        Exercise Plan  Goals Next 30 days  ADL'S: Resume springyard work    Leisure: Start a home walking program    Work: Continue to attend [facility] 2 x week      Education Goals: All goals in this section met    Education Goals Met: Patient can state cardiac s/s and appropriate emergency response.;Has system for taking medication.;Medication review.                          Goals Met  30 day ADL'S goals met: Goal not met    30 day Leisure goals met: Goal met.    30 Day Progression: Pt is progressing, new goals set.      Initial ADL's goals met: Pt. has returned to driving.    Initial Leisure goals met: Pt. is able to tolerate 2-3 flights slowly without sx.    Initial Progression: Pt. is tolerating a 3 met level. He is feeling much better with the discontinuation of many meds.      Exercise Prescription  Exercise Mode: Treadmill;Bike;Nustep;Hallway Walking    Frequency: 2 x week    Duration: 40 min    Intensity / THR: 20-30 beats above resting heart rate    RPE 11-14  Progression / Met level: 2.5-3.5    Resistive Training?: Yes      Current Exercise (mins/week): 10      Interventions  Home Exercise:  Mode: Walking program    Frequency: 5-6 x week    Duration: 30 min      Education Material : Educational videos;Provide written material;Individual education and counseling;Offer educational classes      Education Completed  Exercise Education Completed: Signs and Symptoms;Medication review;RPE;Warm up/cool down;BP/HR Reponse to exercise;Benefits of Exercise;End point of exercise;Emergency Plan;Home Exercise;FITT Principles              Exercise  "Follow-up/Discharge  Follow up/Discharge: Reviewed home walking program   NUTRITION  Nutrition Assessment: Reassessment      Nutrition Risk Factors:  Nutrition Risk Factors: Diabetes;Dyslipidemia  HbA1c: 6.6  Monitors blood sugar at home: No  Cholesterol: NA  LDL: 79  HDL: NA  Triglycerides: NA      Nutrition Plan  Interventions  Other Nutrition Intervention: Diet Class;Therapist/Pt Discussion;Educational Videos;Provide with Written Material    Initial Rate Your Plate Score: 44      Education Completed  Nutrition Education Completed: Low Saturated fat diet;Low sodium diet      Goals  Nutrition Goals (Next 30 days): Patient will follow a low sodium diet;Patient will follow a low saturated fat diet      Goals Met  Nutrition Goals Met: Patient follows a low sodium diet;Patient has lost weight      Height, Weight, and  BMI  Weight: 173 lb (78.5 kg)  Height: 6' 1\" (1.854 m)  BMI: 22.83      Nutrition Follow-up  Follow-up/Discharge: pt may need to see RD again, cardiac event during visit         Other Risk Factors  Other Risk Factor Assessment: Reassessment      HTN Risk Factor: Hypertension      Pre Exercise BP: 94/54  Post Exercise BP: 108/62      Hypertension Plan  Goals  HTN Goals: Follow low sodium diet      Goals Met  HTN Goals Met: Exercises regularly;Take medication as prescribed      HTN Interventions  HTN Interventions: Diet consult;Provide written material;Therapist/patient discussion;Offer educational videos;Offer educational classes      HTN Education Completed  HTN Education Completed: Risk factor overview      Tobacco Risk Factor: Tobacco      Initial Use:: 1 ppd  Current Use:: 0      Tobacco Plan  Tobacco Goals  Tobacco Goals: Patient remain tobacco free      Goals Met  Tobacco Goals Met: Patient remain tobacco free      Tobacco Interventions  Tobacco Interventions: Therapist/patient discussion;Provide written material;Offer educational videos;Offer class on risk factor modification      Tobacco Education " Completed  Tobacco Education Completed: Identifies triggers;Health benefits of tobacco cessation;Risk factor overview      Risk Factor Follow-up   Follow-up/Discharge: Enc educational classes for risk factor management     PSYCHOSOCIAL  Psychosocial Assessment: Reassessment       Psychosocial Risk Factor: NA      Psychosocial Plan  Interventions  Interventions: Offer educational videos and classes;Provide written material;Individual education and counseling      Education Completed  Education Completed: Relaxation/Coping Techniques;S/S of depression;Effects of stress on body      Goals  Goals (Next 30 days): Practicing stress management skills;Identify stressors      Goals Met  Goals Met: Identified Support system;Oriented to stress management classes;Identify stressors      Psychosocial Follow-up  Follow-up/Discharge: Pt reports he is feeling better and stress levels have decreased             Patient involved in Goal setting?: Yes      Signature: _____________________________________________________________    Date: __________________    Time: __________________See Doc Flowsheet   no

## 2023-12-29 NOTE — CONSULT NOTE ADULT - ASSESSMENT
58M with PMHx of  HTN, HLD, melanoma x3 who presented with 1 month of confusion, altered speech, facial droop, and HA. Patient presented to [facility] [place], CTH showed  4.6x4.2 cm R frontal mass w/ additional  3.3x1.6cm adjacent lesion. Patient transferred to [facility] on 12/22/23 for further neurosurgical w/u and evaluation. Pt discharged stable at that time, followed up outpatient and now returned for elective procedure and is s/p R craniotomy for tumor resection. NSICU consulted for close monitoring of pt s/p elective procedure and Q1 neuro checks.    PLAN:    - Q1h neuro checks  - CTH overnight  - MRB to be done whenever possible  - STAT CTH for any changes in neuro exam  - Got 5-ALA at 7am for proceudre; must remain in dark environment for 48 hours from administration (until 12/31/23 @7am)  - Pain control prn, avoid oversedation  - -160  - Keppra 1g BID until f/u w/ Dr. Deleon  - Decadron 4Q6  - Regular diet  - Bowel regimen: senna, miralax  - VTE prophylaxis: SCDs; start Lovenox tomorrow night (12/30/23)  - Activity: OOB as tolerated   - Further medical management per NSICU  - Discussed case with Dr. Shipley   58M with PMHx of  HTN, HLD, melanoma x3 who presented with 1 month of confusion, altered speech, facial droop, and HA. Patient [place] to [facility] [place], [place] showed  4.6x4.2 cm R frontal mass w/ additional  3.3x1.6cm adjacent lesion. Patient transferred to [facility] on 12/22/23 for further neurosurgical w/u and evaluation. Pt discharged stable at that time, followed up outpatient and now returned for elective procedure and is s/p R craniotomy for tumor resection. NSICU consulted for close monitoring of pt s/p elective procedure and Q1 neuro checks.    PLAN:    - Q1h neuro checks  - CTH overnight  - MRB to be done whenever possible  - STAT CTH for any changes in neuro exam  - Got 5-ALA at 7am for proceudre; must remain in dark environment for 48 hours from administration (until 12/31/23 @7am)  - Pain control prn, avoid oversedation  - -160  - Keppra 1g BID until f/u w/ Dr. Deleon  - Decadron 4Q6  - Regular diet  - Bowel regimen: senna, miralax  - VTE prophylaxis: SCDs; start Lovenox tomorrow night (12/30/23)  - Activity: OOB as tolerated   - Further medical management per NSICU  - Discussed case with Dr. Shipley

## 2023-12-30 ENCOUNTER — TRANSCRIPTION ENCOUNTER (OUTPATIENT)
Age: 58
End: 2023-12-30

## 2023-12-30 LAB
ANION GAP SERPL CALC-SCNC: 15 MMOL/L — SIGNIFICANT CHANGE UP (ref 5–17)
ANION GAP SERPL CALC-SCNC: 15 MMOL/L — SIGNIFICANT CHANGE UP (ref 5–17)
APTT BLD: 22.7 SEC — LOW (ref 24.5–35.6)
APTT BLD: 22.7 SEC — LOW (ref 24.5–35.6)
BUN SERPL-MCNC: 15.3 MG/DL — SIGNIFICANT CHANGE UP (ref 8–20)
BUN SERPL-MCNC: 15.3 MG/DL — SIGNIFICANT CHANGE UP (ref 8–20)
CALCIUM SERPL-MCNC: 8.7 MG/DL — SIGNIFICANT CHANGE UP (ref 8.4–10.5)
CALCIUM SERPL-MCNC: 8.7 MG/DL — SIGNIFICANT CHANGE UP (ref 8.4–10.5)
CHLORIDE SERPL-SCNC: 98 MMOL/L — SIGNIFICANT CHANGE UP (ref 96–108)
CHLORIDE SERPL-SCNC: 98 MMOL/L — SIGNIFICANT CHANGE UP (ref 96–108)
CK SERPL-CCNC: 60 U/L — SIGNIFICANT CHANGE UP (ref 30–200)
CK SERPL-CCNC: 60 U/L — SIGNIFICANT CHANGE UP (ref 30–200)
CO2 SERPL-SCNC: 23 MMOL/L — SIGNIFICANT CHANGE UP (ref 22–29)
CO2 SERPL-SCNC: 23 MMOL/L — SIGNIFICANT CHANGE UP (ref 22–29)
CREAT SERPL-MCNC: 0.74 MG/DL — SIGNIFICANT CHANGE UP (ref 0.5–1.3)
CREAT SERPL-MCNC: 0.74 MG/DL — SIGNIFICANT CHANGE UP (ref 0.5–1.3)
EGFR: 104 ML/MIN/1.73M2 — SIGNIFICANT CHANGE UP
EGFR: 104 ML/MIN/1.73M2 — SIGNIFICANT CHANGE UP
GLUCOSE BLDC GLUCOMTR-MCNC: 135 MG/DL — HIGH (ref 70–99)
GLUCOSE BLDC GLUCOMTR-MCNC: 135 MG/DL — HIGH (ref 70–99)
GLUCOSE SERPL-MCNC: 121 MG/DL — HIGH (ref 70–99)
GLUCOSE SERPL-MCNC: 121 MG/DL — HIGH (ref 70–99)
HCT VFR BLD CALC: 39.8 % — SIGNIFICANT CHANGE UP (ref 39–50)
HCT VFR BLD CALC: 39.8 % — SIGNIFICANT CHANGE UP (ref 39–50)
HGB BLD-MCNC: 14.6 G/DL — SIGNIFICANT CHANGE UP (ref 13–17)
HGB BLD-MCNC: 14.6 G/DL — SIGNIFICANT CHANGE UP (ref 13–17)
INR BLD: 0.92 RATIO — SIGNIFICANT CHANGE UP (ref 0.85–1.18)
INR BLD: 0.92 RATIO — SIGNIFICANT CHANGE UP (ref 0.85–1.18)
MAGNESIUM SERPL-MCNC: 2.2 MG/DL — SIGNIFICANT CHANGE UP (ref 1.6–2.6)
MAGNESIUM SERPL-MCNC: 2.2 MG/DL — SIGNIFICANT CHANGE UP (ref 1.6–2.6)
MCHC RBC-ENTMCNC: 31.5 PG — SIGNIFICANT CHANGE UP (ref 27–34)
MCHC RBC-ENTMCNC: 31.5 PG — SIGNIFICANT CHANGE UP (ref 27–34)
MCHC RBC-ENTMCNC: 36.7 GM/DL — HIGH (ref 32–36)
MCHC RBC-ENTMCNC: 36.7 GM/DL — HIGH (ref 32–36)
MCV RBC AUTO: 86 FL — SIGNIFICANT CHANGE UP (ref 80–100)
MCV RBC AUTO: 86 FL — SIGNIFICANT CHANGE UP (ref 80–100)
PHOSPHATE SERPL-MCNC: 4.4 MG/DL — SIGNIFICANT CHANGE UP (ref 2.4–4.7)
PHOSPHATE SERPL-MCNC: 4.4 MG/DL — SIGNIFICANT CHANGE UP (ref 2.4–4.7)
PLATELET # BLD AUTO: 298 K/UL — SIGNIFICANT CHANGE UP (ref 150–400)
PLATELET # BLD AUTO: 298 K/UL — SIGNIFICANT CHANGE UP (ref 150–400)
POTASSIUM SERPL-MCNC: 4.3 MMOL/L — SIGNIFICANT CHANGE UP (ref 3.5–5.3)
POTASSIUM SERPL-MCNC: 4.3 MMOL/L — SIGNIFICANT CHANGE UP (ref 3.5–5.3)
POTASSIUM SERPL-SCNC: 4.3 MMOL/L — SIGNIFICANT CHANGE UP (ref 3.5–5.3)
POTASSIUM SERPL-SCNC: 4.3 MMOL/L — SIGNIFICANT CHANGE UP (ref 3.5–5.3)
PROTHROM AB SERPL-ACNC: 10.2 SEC — SIGNIFICANT CHANGE UP (ref 9.5–13)
PROTHROM AB SERPL-ACNC: 10.2 SEC — SIGNIFICANT CHANGE UP (ref 9.5–13)
RBC # BLD: 4.63 M/UL — SIGNIFICANT CHANGE UP (ref 4.2–5.8)
RBC # BLD: 4.63 M/UL — SIGNIFICANT CHANGE UP (ref 4.2–5.8)
RBC # FLD: 12.9 % — SIGNIFICANT CHANGE UP (ref 10.3–14.5)
RBC # FLD: 12.9 % — SIGNIFICANT CHANGE UP (ref 10.3–14.5)
SODIUM SERPL-SCNC: 136 MMOL/L — SIGNIFICANT CHANGE UP (ref 135–145)
SODIUM SERPL-SCNC: 136 MMOL/L — SIGNIFICANT CHANGE UP (ref 135–145)
TROPONIN T, HIGH SENSITIVITY RESULT: 9 NG/L — SIGNIFICANT CHANGE UP (ref 0–51)
TROPONIN T, HIGH SENSITIVITY RESULT: 9 NG/L — SIGNIFICANT CHANGE UP (ref 0–51)
WBC # BLD: 15.29 K/UL — HIGH (ref 3.8–10.5)
WBC # BLD: 15.29 K/UL — HIGH (ref 3.8–10.5)
WBC # FLD AUTO: 15.29 K/UL — HIGH (ref 3.8–10.5)
WBC # FLD AUTO: 15.29 K/UL — HIGH (ref 3.8–10.5)

## 2023-12-30 PROCEDURE — 93010 ELECTROCARDIOGRAM REPORT: CPT

## 2023-12-30 PROCEDURE — 70450 CT HEAD/BRAIN W/O DYE: CPT | Mod: 26

## 2023-12-30 PROCEDURE — 99233 SBSQ HOSP IP/OBS HIGH 50: CPT

## 2023-12-30 PROCEDURE — 70553 MRI BRAIN STEM W/O & W/DYE: CPT | Mod: 26

## 2023-12-30 RX ORDER — ENOXAPARIN SODIUM 100 MG/ML
40 INJECTION SUBCUTANEOUS EVERY 24 HOURS
Refills: 0 | Status: DISCONTINUED | OUTPATIENT
Start: 2023-12-30 | End: 2023-12-31

## 2023-12-30 RX ORDER — DIPHENHYDRAMINE HYDROCHLORIDE AND LIDOCAINE HYDROCHLORIDE AND ALUMINUM HYDROXIDE AND MAGNESIUM HYDRO
25 KIT EVERY 6 HOURS
Refills: 0 | Status: DISCONTINUED | OUTPATIENT
Start: 2023-12-30 | End: 2023-12-30

## 2023-12-30 RX ORDER — ACETAMINOPHEN 500 MG
1000 TABLET ORAL ONCE
Refills: 0 | Status: COMPLETED | OUTPATIENT
Start: 2023-12-30 | End: 2023-12-30

## 2023-12-30 RX ADMIN — Medication 4 MILLIGRAM(S): at 00:27

## 2023-12-30 RX ADMIN — POLYETHYLENE GLYCOL 3350 17 GRAM(S): 17 POWDER, FOR SOLUTION ORAL at 11:22

## 2023-12-30 RX ADMIN — LEVETIRACETAM 1000 MILLIGRAM(S): 250 TABLET, FILM COATED ORAL at 17:06

## 2023-12-30 RX ADMIN — OXYCODONE HYDROCHLORIDE 5 MILLIGRAM(S): 5 TABLET ORAL at 17:06

## 2023-12-30 RX ADMIN — Medication 30 MILLILITER(S): at 05:59

## 2023-12-30 RX ADMIN — Medication 400 MILLIGRAM(S): at 13:36

## 2023-12-30 RX ADMIN — BENZOCAINE AND MENTHOL 1 LOZENGE: 5; 1 LIQUID ORAL at 14:03

## 2023-12-30 RX ADMIN — Medication 2000 MILLIGRAM(S): at 02:01

## 2023-12-30 RX ADMIN — Medication 2000 MILLIGRAM(S): at 11:22

## 2023-12-30 RX ADMIN — Medication 4 MILLIGRAM(S): at 17:06

## 2023-12-30 RX ADMIN — Medication 4 MILLIGRAM(S): at 23:37

## 2023-12-30 RX ADMIN — Medication 1000 MILLIGRAM(S): at 14:07

## 2023-12-30 RX ADMIN — LEVETIRACETAM 1000 MILLIGRAM(S): 250 TABLET, FILM COATED ORAL at 02:01

## 2023-12-30 RX ADMIN — OXYCODONE HYDROCHLORIDE 5 MILLIGRAM(S): 5 TABLET ORAL at 18:00

## 2023-12-30 RX ADMIN — OXYCODONE HYDROCHLORIDE 10 MILLIGRAM(S): 5 TABLET ORAL at 04:19

## 2023-12-30 RX ADMIN — PANTOPRAZOLE SODIUM 40 MILLIGRAM(S): 20 TABLET, DELAYED RELEASE ORAL at 05:58

## 2023-12-30 RX ADMIN — Medication 4 MILLIGRAM(S): at 05:58

## 2023-12-30 RX ADMIN — Medication 4 MILLIGRAM(S): at 11:22

## 2023-12-30 RX ADMIN — OXYCODONE HYDROCHLORIDE 10 MILLIGRAM(S): 5 TABLET ORAL at 23:37

## 2023-12-30 RX ADMIN — ENOXAPARIN SODIUM 40 MILLIGRAM(S): 100 INJECTION SUBCUTANEOUS at 17:06

## 2023-12-30 RX ADMIN — OXYCODONE HYDROCHLORIDE 10 MILLIGRAM(S): 5 TABLET ORAL at 05:00

## 2023-12-30 NOTE — DISCHARGE NOTE PROVIDER - HOSPITAL COURSE
58M with PMHx of HTN, HLD, melanoma x3 last resection 1 year ago presented 12/29/23 for 5-ALA (Gleolan) utilized; R frontal brain mass and R frontal cystic lesion decompression w/ Dr. Deleon and assisted by Dr. Nova. Patient remained in dark room for 48 hours post Gleolan administration. Post operative course was unremarkable. Physical Therapy and Occupational therapy evaluated patient and recommended  ___    .   On the day of discharge patient is hemodynamically stable and medically cleared for discharge. 58M with PMHx of HTN, HLD, melanoma x3 last resection 1 year ago presented 12/29/23 for 5-ALA (Gleolan) utilized; R frontal brain mass and R frontal cystic lesion decompression w/ Dr. Deleon and assisted by Dr. Nova. Patient remained in dark room for 48 hours post Gleolan administration. Post operative course was unremarkable. Physical Therapy and Occupational therapy evaluated patient and recommended home with no needs. On the day of discharge patient is hemodynamically stable and medically cleared for discharge. 58M with PMHx of HTN, HLD, melanoma x3 last resection 1 year ago presented 12/29/23 for right crani for frontal brain tumor resection with 5-ALA resection w/ Dr. Deleon. Patient remained in dark room for 48 hours post Gleolan administration. Post operative course was unremarkable. Physical Therapy and Occupational therapy evaluated patient and recommended home with no needs. On the day of discharge patient is hemodynamically stable and medically cleared for discharge.

## 2023-12-30 NOTE — PROGRESS NOTE ADULT - SUBJECTIVE AND OBJECTIVE BOX
INTERVAL HPI/ACUTE EVENTS:  57yo male s/p R craniotomy for brain tumor resection, now POD#1. Patient seen lying in bed. Complaining of burning chest pain across chest with tightness, EKG and cardiac enzymes pending.    ICU Vital Signs Last 24 Hrs  T(C): 36.6 (29 Dec 2023 23:19), Max: 36.7 (29 Dec 2023 13:05)  T(F): 97.9 (29 Dec 2023 23:19), Max: 98 (29 Dec 2023 13:05)  HR: 61 (29 Dec 2023 23:00) (61 - 79)  BP: 108/80 (29 Dec 2023 22:00) (107/40 - 148/94)  BP(mean): 90 (29 Dec 2023 22:00) (57 - 109)  ABP: 109/54 (29 Dec 2023 23:00) (101/52 - 141/58)  ABP(mean): 71 (29 Dec 2023 23:00) (68 - 80)  RR: 14 (29 Dec 2023 23:00) (13 - 20)  SpO2: 95% (29 Dec 2023 23:00) (95% - 99%)    O2 Parameters below as of 29 Dec 2023 22:00  Patient On (Oxygen Delivery Method): room air    PHYSICAL EXAM:  GENERAL: NAD, well-groomed  HEAD:  S/p R crani. Head wrap w/ kerlex in place  DRAINS: Subgaleal hemovac to full suction  WOUND: Dressing clean dry intact  CECILY COMA SCORE: E-4 V-5 M-6 = 15  MENTAL STATUS: AAO x3; Awake; Opens eyes spontaneously; Appropriately conversant without aphasia; following simple commands  CRANIAL NERVES: PERRL, EOMI without nystagmus. Facial sensation intact V1-3 distribution b/l. Face grossly symmetric. Tongue midline. Hearing grossly intact. Speech clear.    MOTOR: strength 5/5 b/l upper and lower extremities  SENSATION: grossly intact to light touch all extremities  COORDINATION: Gait not assessed  CHEST/LUNG: Non-labored breathing  EXTREMITIES: no clubbing, cyanosis, or edema  SKIN: Warm, dry    LABS:                                   13.8   14.73 )-----------( 297      ( 29 Dec 2023 15:10 )             39.9   12-29    136  |  100  |  17.7  ----------------------------<  120<H>  4.2   |  22.0  |  0.79    Ca    8.4      29 Dec 2023 15:10  Phos  5.2     12-29  Mg     1.9     12-29    Urinalysis Basic - ( 29 Dec 2023 15:10 )    Color: x / Appearance: x / SG: x / pH: x  Gluc: 120 mg/dL / Ketone: x  / Bili: x / Urobili: x   Blood: x / Protein: x / Nitrite: x   Leuk Esterase: x / RBC: x / WBC x   Sq Epi: x / Non Sq Epi: x / Bacteria: x    PT/INR - ( 29 Dec 2023 15:10 )   PT: 10.4 sec;   INR: 0.94 ratio         PTT - ( 29 Dec 2023 15:10 )  PTT:24.3 sec    I&O's Summary    29 Dec 2023 07:01  -  30 Dec 2023 00:47  --------------------------------------------------------  IN: 1300 mL / OUT: 1375 mL / NET: -75 mL      RADIOLOGY/OTHER:    < from: MR Brain Stereotactic w/wo IV Cont (12.22.23 @ 18:41) >    There is a heterogeneous mass lesion in the right frontal lobe, centered   in white matter/periventricular location with central necrosis and   irregular peripheral enhancement. This measures 3.8 x 3.9 x 2.6 cm   (transverse x AP x CC; series 6, image 95). Beyond the dominant mass   there are satellite foci of enhancement at its posterior aspect   (measuring up to 6 mm on image 100, and there are separate nodular and   minimally necrotic nodules at the right insula, measuring up to 11 mm on   image 107. Inferior to the dominant nodule is a cystic area with thinner   peripheral enhancement, cyst examination measuring 2.8 x 3.6 x 1.5 cm.   Lateral to this there is linear plaque-like multifocal enhancement (image   116) suspicious for local leptomeningeal deposition.    Larger zone of masslike and nonenhancing FLAIR signal diffusely involves   the right basal ganglia, as well as cortical sites in the right inferior   frontal lobe,the entirety of the insula, and a portion of the anterior   and central temporal lobe. Minimal extension across midline at the   hypothalamus level is suggested with tumor signal at right optic tract   level and equivocal at anterior commissure. No extension through corpus   callosum.    Considerable mass effect includes effacement of the right lateral   ventricle as well as the third ventricle. Right temporal horn is effaced   from nonenhancing tumor but not dilated or entrapment. Midline shift to   the left at septum pellucidum level is measured at 8 mm there is similar   to the recent CT. No transtentorial downward herniation.    No additional mass or signal abnormality in the left cerebral hemisphere,   the brainstem or either cerebellar hemisphere.    Diffusion imaging is negative for recent infarct. Peripheral hemorrhage   signal is identified in the dominant mass and minimally at the inferior   cystic focus.    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 Larger zone FLAIR signal involves basal ganglia, insula and   further sites of frontal and temporal lobes.    Likely diagnosis is high-grade glioma; correlate with surgical pathology.    --- End of Report ---    < from: CT Head No Cont (12.29.23 @ 06:19) >  There is a cystic lesion in the region of the right caudate head which   does not demonstrate diffusion restriction on MRI or thin rim   enhancement, therefore not likely being an abscess. On CT this measures   4.1 cm in AP diameter by 4.2 cm transversely by 4.6 cmin craniocaudal   diameter and appears bilobed on the coronal imaging. There is mass effect   on the frontal horn of the right lateral ventricle and midline shift to   the left. There is a small amount of vasogenic edema. This likely   represents a neoplasm and may represent a primary or secondary neoplasm   based on its appearance. There is no hydrocephalus.      Impression: Cystic appearing right caudate head mass with mass effect and   vasogenic edema. Mild midline shift to the left likely represents a   neoplasm, primary or secondary. No significant change since 12/22/2023.    --- End of Report ---     INTERVAL HPI/ACUTE EVENTS:  59yo male s/p R craniotomy for brain tumor resection, now POD#1. Patient seen lying in bed. Complaining of burning chest pain across chest with tightness, EKG and cardiac enzymes pending. Head CT completed.    ICU Vital Signs Last 24 Hrs  T(C): 36.6 (29 Dec 2023 23:19), Max: 36.7 (29 Dec 2023 13:05)  T(F): 97.9 (29 Dec 2023 23:19), Max: 98 (29 Dec 2023 13:05)  HR: 61 (29 Dec 2023 23:00) (61 - 79)  BP: 108/80 (29 Dec 2023 22:00) (107/40 - 148/94)  BP(mean): 90 (29 Dec 2023 22:00) (57 - 109)  ABP: 109/54 (29 Dec 2023 23:00) (101/52 - 141/58)  ABP(mean): 71 (29 Dec 2023 23:00) (68 - 80)  RR: 14 (29 Dec 2023 23:00) (13 - 20)  SpO2: 95% (29 Dec 2023 23:00) (95% - 99%)    O2 Parameters below as of 29 Dec 2023 22:00  Patient On (Oxygen Delivery Method): room air    PHYSICAL EXAM:  GENERAL: NAD, well-groomed  HEAD:  S/p R crani. Head wrap w/ kerlex in place  DRAINS: Subgaleal hemovac to full suction, removed at bedside  WOUND: Dressing clean dry intact  CECILY COMA SCORE: E-4 V-5 M-6 = 15  MENTAL STATUS: AAO x3; Awake; Opens eyes spontaneously; Appropriately conversant without aphasia; following simple commands  CRANIAL NERVES: PERRL, EOMI without nystagmus. Facial sensation intact V1-3 distribution b/l. Lower left facial droop (present preop). Tongue midline. Hearing grossly intact. Speech clear.    MOTOR: strength 5/5 b/l upper and lower extremities  SENSATION: grossly intact to light touch all extremities  COORDINATION: Gait not assessed  CHEST/LUNG: Non-labored breathing    LABS:                                   13.8   14.73 )-----------( 297      ( 29 Dec 2023 15:10 )             39.9   12-29    136  |  100  |  17.7  ----------------------------<  120<H>  4.2   |  22.0  |  0.79    Ca    8.4      29 Dec 2023 15:10  Phos  5.2     12-29  Mg     1.9     12-29    Urinalysis Basic - ( 29 Dec 2023 15:10 )    Color: x / Appearance: x / SG: x / pH: x  Gluc: 120 mg/dL / Ketone: x  / Bili: x / Urobili: x   Blood: x / Protein: x / Nitrite: x   Leuk Esterase: x / RBC: x / WBC x   Sq Epi: x / Non Sq Epi: x / Bacteria: x    PT/INR - ( 29 Dec 2023 15:10 )   PT: 10.4 sec;   INR: 0.94 ratio         PTT - ( 29 Dec 2023 15:10 )  PTT:24.3 sec    I&O's Summary    29 Dec 2023 07:01  -  30 Dec 2023 00:47  --------------------------------------------------------  IN: 1300 mL / OUT: 1375 mL / NET: -75 mL      RADIOLOGY/OTHER:    < from: MR Brain Stereotactic w/wo IV Cont (12.22.23 @ 18:41) >    There is a heterogeneous mass lesion in the right frontal lobe, centered   in white matter/periventricular location with central necrosis and   irregular peripheral enhancement. This measures 3.8 x 3.9 x 2.6 cm   (transverse x AP x CC; series 6, image 95). Beyond the dominant mass   there are satellite foci of enhancement at its posterior aspect   (measuring up to 6 mm on image 100, and there are separate nodular and   minimally necrotic nodules at the right insula, measuring up to 11 mm on   image 107. Inferior to the dominant nodule is a cystic area with thinner   peripheral enhancement, cyst examination measuring 2.8 x 3.6 x 1.5 cm.   Lateral to this there is linear plaque-like multifocal enhancement (image   116) suspicious for local leptomeningeal deposition.    Larger zone of masslike and nonenhancing FLAIR signal diffusely involves   the right basal ganglia, as well as cortical sites in the right inferior   frontal lobe,the entirety of the insula, and a portion of the anterior   and central temporal lobe. Minimal extension across midline at the   hypothalamus level is suggested with tumor signal at right optic tract   level and equivocal at anterior commissure. No extension through corpus   callosum.    Considerable mass effect includes effacement of the right lateral   ventricle as well as the third ventricle. Right temporal horn is effaced   from nonenhancing tumor but not dilated or entrapment. Midline shift to   the left at septum pellucidum level is measured at 8 mm there is similar   to the recent CT. No transtentorial downward herniation.    No additional mass or signal abnormality in the left cerebral hemisphere,   the brainstem or either cerebellar hemisphere.    Diffusion imaging is negative for recent infarct. Peripheral hemorrhage   signal is identified in the dominant mass and minimally at the inferior   cystic focus.    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 Larger zone FLAIR signal involves basal ganglia, insula and   further sites of frontal and temporal lobes.    Likely diagnosis is high-grade glioma; correlate with surgical pathology.    --- End of Report ---    < from: CT Head No Cont (12.29.23 @ 06:19) >  There is a cystic lesion in the region of the right caudate head which   does not demonstrate diffusion restriction on MRI or thin rim   enhancement, therefore not likely being an abscess. On CT this measures   4.1 cm in AP diameter by 4.2 cm transversely by 4.6 cmin craniocaudal   diameter and appears bilobed on the coronal imaging. There is mass effect   on the frontal horn of the right lateral ventricle and midline shift to   the left. There is a small amount of vasogenic edema. This likely   represents a neoplasm and may represent a primary or secondary neoplasm   based on its appearance. There is no hydrocephalus.      Impression: Cystic appearing right caudate head mass with mass effect and   vasogenic edema. Mild midline shift to the left likely represents a   neoplasm, primary or secondary. No significant change since 12/22/2023.    --- End of Report ---

## 2023-12-30 NOTE — PROGRESS NOTE ADULT - ASSESSMENT
Assessment:   58M PMHx HTN, HLD, melanoma x3 (last resection 2022) originally presented to [facility] on 12/22/23 with 1 month of confusion, altered speech, L facial droop and HA. Now s/p elective R craniotomy for tumor resection    PLAN:  - Q4h neuro checks  - MRI w/wo today  - s/p 5-ALA, remain in dark room x 48 hours (until 12/31 @ ~7am), due to photosensitivity  - Pain control prn, avoid oversedation  - Normotensive  - Keppra 1000mg BID  - Decadron taper  - Normal diet  - Bowel regimen: Senna, Miralax  - VTE prophylaxis: SCDs, SQL  - Activity: PT, OT   Assessment:   58M PMHx HTN, HLD, melanoma x3 (last resection 2022) originally presented to [facility] on 12/22/23 with 1 month of confusion, altered speech, L facial droop and HA. Now s/p elective R craniotomy for tumor resection    PLAN:  - Q4h neuro checks  - MRI w/wo today  - s/p 5-ALA, remain in dark room x 48 hours (until 12/31 @ ~7am), due to photosensitivity  - Pain control prn, avoid oversedation  - Normotensive  - Keppra 1000mg BID  - Decadron taper  - Normal diet  - Bowel regimen: Senna, Miralax  - VTE prophylaxis: SCDs, SQL  - Activity: PT, OT

## 2023-12-30 NOTE — DISCHARGE NOTE PROVIDER - NSDCFUADDINST_GEN_ALL_CORE_FT
You had a 5-ALA (Gleolan) utilized; R frontal brain mass and R frontal cystic lesion decompressionon on 12/29/23 w/ Dr. Tracie Deleon and assisted by Dr. Nova. Please follow up with your Neurosurgeon Dr. Tracie Deleon in 2 weeks from discharge.  Please DO NOT take any Anticoagulative/Antiplatelet (Blood thinning) medications including but not limited to Aspirin and NSAIDs (Advil, Aleve, Motrin, Ibuprofen) until cleared by your Neurosurgeon.   You have been started on a new medication, decadron.  Decadron is a steroid and helps with swelling. You will be on a 2 week taper so please follow the instructions as dosing may change daily.  Some common side effects of decadron include increased appetite, irritability, difficulty sleeping, swelling in your ankles, heartburn, and increased sugars.  Please notify your doctor of any side effects.   You have been started on a new medication, Oxycodone.  Oxycodone helps to control pain. Oxycodone is an additive medication so it is important to limit the use of this medication.  Side effects include nausea, vomiting, constipation, dry mouth, lightheadedness, dizziness or drowsiness.  It is important to tell your physician if you experience any of these side effects. You will be only given 3 days or 12 pills to be utilized every 6hours as needed for pain.  You should also utilize Tylenol 975mg every 4 hours as needed for pain in concert with the oxcodone.  It is ok to walk for activity, but NO jogging, running, heavy lifting, strenuous activity, twisting, bending, driving, or working until cleared by your Surgeon.   Any staples/non-absorbable sutures will be removed on follow up with your Neurosurgeon.   Please be mindful of clothing application over the incision site. DO NOT wear tight fitting hats/clothing/accessories over or around the incision site, this can lead to wound breakdown and infection. Only allow for loose fitting items over the area.  You may shower POD 3 (Monday 1/1), but DO NOT submerge or soak incision for a prolonged period of time, DO NOT scrub incision site, allow for warm soapy water to run over incision but pat dry only. DO NOT apply creams/lotions/ointments to the incision site as this will not allow for proper wound healing.  Return to ER immediately for any of the following: fever, bleeding, new onset numbness/tingling/weakness, nausea and/or vomiting, chest pain, shortness of breath, confusion, seizure, altered mental status, urinary and/or fecal incontinence or retention. You had a 5-ALA (Gleolan) utilized; R frontal brain mass and R frontal cystic lesion decompression on 12/29/23 w/ Dr. Tracie Deleon and assisted by Dr. Nova. Please follow up with your Neurosurgeon Dr. Tracie Deleon in 2 weeks from discharge.  Please DO NOT take any Anticoagulative/Antiplatelet (Blood thinning) medications including but not limited to Aspirin and NSAIDs (Advil, Aleve, Motrin, Ibuprofen) until cleared by your Neurosurgeon.   You have been started on a new medication, decadron.  Decadron is a steroid and helps with swelling. You will be on a 2 week taper so please follow the instructions as dosing may change daily.  Some common side effects of decadron include increased appetite, irritability, difficulty sleeping, swelling in your ankles, heartburn, and increased sugars.  Please notify your doctor of any side effects.   You have been started on a new medication, Oxycodone.  Oxycodone helps to control pain. Oxycodone is an additive medication so it is important to limit the use of this medication.  Side effects include nausea, vomiting, constipation, dry mouth, lightheadedness, dizziness or drowsiness.  It is important to tell your physician if you experience any of these side effects. You will be only given 3 days or 12 pills to be utilized every 6hours as needed for pain.  You should also utilize Tylenol 975mg every 4 hours as needed for pain in concert with the oxcodone.  It is ok to walk for activity, but NO jogging, running, heavy lifting, strenuous activity, twisting, bending, driving, or working until cleared by your Surgeon.   Any staples/non-absorbable sutures will be removed on follow up with your Neurosurgeon.   Please be mindful of clothing application over the incision site. DO NOT wear tight fitting hats/clothing/accessories over or around the incision site, this can lead to wound breakdown and infection. Only allow for loose fitting items over the area.  You may shower POD 3 (Monday 1/1), but DO NOT submerge or soak incision for a prolonged period of time, DO NOT scrub incision site, allow for warm soapy water to run over incision but pat dry only. DO NOT apply creams/lotions/ointments to the incision site as this will not allow for proper wound healing.  Return to ER immediately for any of the following: fever, bleeding, new onset numbness/tingling/weakness, nausea and/or vomiting, chest pain, shortness of breath, confusion, seizure, altered mental status, urinary and/or fecal incontinence or retention.

## 2023-12-30 NOTE — PROVIDER CONTACT NOTE (CHANGE IN STATUS NOTIFICATION) - ACTION/TREATMENT ORDERED:
ALPA Trejo made aware. Cardiac enzymes drawn & EKG performed, Tea with honey given to patient for possible reflux. Pt reports symptoms have now resolved.

## 2023-12-30 NOTE — DISCHARGE NOTE PROVIDER - CARE PROVIDER_API CALL
Tracie Deleon  Neurosurgery  [address] [place] [id number]  Phone: [phone]  Fax: [phone]  Follow Up Time:    Tracie Deleon  Neurosurgery  [address] [place] [id number]  Phone: [phone]  Fax: [phone]  Follow Up Time:    Tracie Deleon  Neurosurgery  [address] [place] [id number]  Phone: [phone]  Fax: [phone]  Follow Up Time:    Tracie Deleon  Neurosurgery  [address] [place] [id number]  Phone: [phone]  Fax: [phone]  Follow Up Time:

## 2023-12-30 NOTE — DISCHARGE NOTE PROVIDER - NSDCCAREPROVSEEN_GEN_ALL_CORE_FT
OFFICE VISIT      Patient: Jaimee Brand Date of Service: 2022   : 1993 MRN: [id number]     SUBJECTIVE:     Chief Complaint   Patient presents with   • Office Visit   • Derm Problem     cellulitis on lower abdomen       HISTORY OF PRESENT ILLNESS:  Here for follow up.  Patient is taking the antibiotic and the cellulitis has improved.  Here with a list.  C/o hair loss, joint pains all over, face rash, arm rashes, fatigue, migraines, fevers randomly, left lower chest pain that has returned.  Had right facial pain and was told she had a blocked saliva duct but has since resolved.  Has had enlarged tonsils.  No swelling in the joints.          PAST MEDICAL HISTORY:  Past Medical History:   Diagnosis Date   • Anxiety    • Cellulitis    • Depression    • Fracture    • No known problems    • Urinary tract infection        MEDICATIONS:  Current Outpatient Medications   Medication Sig   • cefdinir (OMNICEF) 300 MG capsule Take 1 capsule by mouth 2 times daily for 7 days.   • doxycycline hyclate (VIBRAMYCIN) 100 MG capsule Take 1 capsule by mouth 2 times daily.     No current facility-administered medications for this visit.       ALLERGIES:  ALLERGIES:   Allergen Reactions   • Adhesive   (Environmental) Dermatitis   • Vancomycin Other (See Comments)     Red man syndrome. Required IV methylpred, diphenhydramine and famotidine on 22       PAST SURGICAL HISTORY:  Past Surgical History:   Procedure Laterality Date   • No past surgeries         FAMILY HISTORY:  Family History   Problem Relation Age of Onset   • Hypothyroid Mother    • Rheumatoid Arthritis Mother    • Hyperthyroid Mother    • Cancer Father         brain   • Cancer Sister         skin   • Heart disease Paternal Grandmother    • Myocardial Infarction Maternal Grandfather          at 50       SOCIAL HISTORY:  Social History     Tobacco Use   • Smoking status: Former Smoker     Packs/day: 0.50     Types: Cigarettes     Quit date: 2022     Years  since quittin.2   • Smokeless tobacco: Never Used   Vaping Use   • Vaping Use: never used   Substance Use Topics   • Alcohol use: Yes   • Drug use: Yes     Types: Marijuana       Review of Systems   Constitutional: Negative for fever.   Respiratory: Negative for shortness of breath.    Cardiovascular: Negative for chest pain and leg swelling.   Gastrointestinal: Negative for constipation and diarrhea.   Genitourinary: Negative for difficulty urinating.   Hematological: Negative for adenopathy.         OBJECTIVE:     Physical Exam  Constitutional:       Appearance: She is well-developed.   HENT:      Head: Normocephalic and atraumatic.   Eyes:      General: No scleral icterus.        Right eye: No discharge.         Left eye: No discharge.   Neck:      Thyroid: No thyromegaly.   Cardiovascular:      Rate and Rhythm: Normal rate and regular rhythm.      Heart sounds: Normal heart sounds. No murmur heard.  Pulmonary:      Effort: No respiratory distress.      Breath sounds: Normal breath sounds. No rales.   Abdominal:      General: Bowel sounds are normal. There is no distension.      Palpations: Abdomen is soft.      Tenderness: There is no abdominal tenderness.   Musculoskeletal:         General: No tenderness.   Lymphadenopathy:      Cervical: No cervical adenopathy.   Skin:     General: Skin is warm and dry.   Neurological:      Mental Status: She is alert and oriented to person, place, and time.         Visit Vitals  /89 (BP Location: LUE - Left upper extremity, Patient Position: Sitting, Cuff Size: Large Adult)   Pulse 90   Temp 98.7 °F (37.1 °C) (Oral)   Resp 18   Ht 5' 5\" (1.651 m)   Wt (!) 138.7 kg (305 lb 12.5 oz)   LMP 2022   SpO2 98%   BMI 50.88 kg/m²         Wt Readings from Last 1 Encounters:   22 (!) 138.7 kg (305 lb 12.5 oz)          DIAGNOSTIC STUDIES:   LAB RESULTS:        Assessment AND PLAN:   No problem-specific Assessment & Plan notes found for this encounter.      Jaimee harris  seen today for office visit and derm problem.    Diagnoses and all orders for this visit:    Arthralgia of multiple joints  Comments:  labs  Orders:  -     SEDIMENTATION RATE WESTERGREN; Future  -     JULIANNA SCREEN WITH ANTIBODY AND IFA REFLEX; Future  -     CBC WITH DIFFERENTIAL; Future  -     COMPREHENSIVE METABOLIC PANEL    Cellulitis of abdominal wall  Comments:  healing  finish antibiotics  Orders:  -     SEDIMENTATION RATE WESTERGREN; Future  -     JULIANNA SCREEN WITH ANTIBODY AND IFA REFLEX; Future  -     CBC WITH DIFFERENTIAL; Future  -     COMPREHENSIVE METABOLIC PANEL        Patient Instructions   Finish antibiotics  labs      No follow-ups on file.    Instructions provided as documented in the AVS.      The patient indicated understanding of the diagnosis and agreed with the plan of care.      Fabio Stewart MD  4/5/2022    Pancho, Tracie

## 2023-12-30 NOTE — PROGRESS NOTE ADULT - ASSESSMENT
Assessment:   58M PMHx HTN, HLD, melanoma x3 (last resection 2022) originally presented to [facility] on 12/22/23 with 1 month of confusion, altered speech, L facial droop and HA. Patient was discharged and returned today for elective R craniotomy for tumor resection, now POD#1.     PLAN:  - Q1h neuro checks  - CTH in AM  - MRI w/wo during this admission  - 1 subgaleal hemovac drain to full suction; monitor output - plan to remove 12/30  - s/p 5-ALA, remain in dark room x 48 hours (until 12/31 @ ~7am), due to photosensitivity  - Pain control prn, avoid oversedation  - Normotensive  - Keppra 1000mg BID  - Decadron 4q6h  - Protonix while taking steroids  - Burning Chest pain, EKG and Cardiac Enzymes pending  - CLD overnight, diet tomorrow if exam ok in morning  - Bowel regimen: Senna, Miralax  - Ancef x 3 doses  - VTE prophylaxis: SCDs, hold chemoprophylaxis due to post-op bleeding risk. May start chemical ppx tomorrow night (12/30)  - Activity: PT, OT  - Further medical management per NSICU  - Further plan discussion in AM Assessment:   58M PMHx HTN, HLD, melanoma x3 (last resection 2022) originally presented to [facility] on 12/22/23 with 1 month of confusion, altered speech, L facial droop and HA. Patient was discharged and returned today for elective R craniotomy for tumor resection, now POD#1.     PLAN:  - Q1h neuro checks  - CTH in AM  - MRI w/wo during this admission  - 1 subgaleal hemovac drain to full suction; monitor output - plan to remove 12/30  - s/p 5-ALA, remain in dark room x 48 hours (until 12/31 @ ~7am), due to photosensitivity  - Pain control prn, avoid oversedation  - Normotensive  - Keppra 1000mg BID  - Decadron 4q6h  - Protonix while taking steroids  - Burning Chest pain, EKG and Cardiac Enzymes pending  - CLD overnight, diet tomorrow if exam ok in morning  - Bowel regimen: Senna, Miralax  - Ancef x 3 doses  - VTE prophylaxis: SCDs, hold chemoprophylaxis due to post-op bleeding risk. May start chemical ppx tomorrow night (12/30)  - Activity: PT, OT  - Further medical management per NSICU  - Further plan discussion in AM Assessment:   58M PMHx HTN, HLD, melanoma x3 (last resection 2022) originally presented to [facility] on 12/22/23 with 1 month of confusion, altered speech, L facial droop and HA. Patient was discharged and returned today for elective R craniotomy for tumor resection, now POD#1.     PLAN:  - Q4h neuro checks  - MRI w/wo during this admission  - Drain removed  - s/p 5-ALA, remain in dark room x 48 hours (until 12/31 @ ~7am), due to photosensitivity  - Pain control prn, avoid oversedation  - Normotensive  - Keppra 1000mg BID  - Decadron 4q6h, taper pending MRI  - Protonix while taking steroids  - Burning Chest pain, EKG and Cardiac Enzymes pending  - Normal diet  - Bowel regimen: Senna, Miralax  - Ancef x 3 doses  - VTE prophylaxis: SCDs, chemical dvt ppx ok  - Activity: PT, OT  - Further medical management per NSICU Assessment:   58M PMHx HTN, HLD, melanoma x3 (last resection 2022) originally presented to [facility] on 12/22/23 with 1 month of confusion, altered speech, L facial droop and HA. Patient was discharged and returned today for elective R craniotomy for tumor resection, now POD#1.     PLAN:  - Q4h neuro checks  - MRI w/wo during this admission  - Drain removed  - s/p 5-ALA, remain in dark room x 48 hours (until 12/31 @ ~7am), due to photosensitivity  - Pain control prn, avoid oversedation  - Normotensive  - Keppra 1000mg BID  - Decadron 4q6h, taper pending MRI  - Protonix while taking steroids  - Burning Chest pain, EKG and Cardiac Enzymes pending  - Normal diet  - Bowel regimen: Senna, Miralax  - Ancef x 3 doses  - VTE prophylaxis: SCDs, chemical dvt ppx ok  - Activity: PT, OT  - Further medical management per NSICU

## 2023-12-30 NOTE — DISCHARGE NOTE PROVIDER - NSDCMRMEDTOKEN_GEN_ALL_CORE_FT
acetaminophen 325 mg oral tablet: 2 tab(s) orally every 6 hours As needed Temp greater or equal to 38C (100.4F), Mild Pain (1 - 3)  dexAMETHasone 4 mg oral tablet: 1 tab(s) orally every 6 hours  hydroCHLOROthiazide 25 mg oral tablet: 1 tab(s) orally once a day  levETIRAcetam 500 mg oral tablet: 1 tab(s) orally 2 times a day  Multiple Vitamins oral tablet: 1 tab(s) orally once a day   dexAMETHasone 2 mg oral tablet: 1 tab(s) orally every 6 hours 1 tab every 6 hours for 3 days (1/5-1/7)  1 tab every 8 hours for 3 days (1/8-1/10)  1 tab every 12 hours for 2 days (1/11-1/12)  1 tab on 1/13 and stop  dexAMETHasone 4 mg oral tablet: 1 tab(s) orally every 6 hours 1 tabs every 6 hours for 3 days (12/31-1/2)  1 tab every 8 hours for 3 days (1/2-1/4)  Keppra 500 mg oral tablet: 1 tab(s) orally 2 times a day  Multiple Vitamins oral tablet: 1 tab(s) orally once a day  oxyCODONE 10 mg oral tablet: 1 tab(s) orally every 6 hours as needed for  severe pain Take half tab for moderate pain, 1 full tab for severe pain MDD: 4 tabs  Protonix 40 mg oral delayed release tablet: 1 tab(s) orally once a day Take while on steroids and for 5 days after

## 2023-12-30 NOTE — DISCHARGE NOTE PROVIDER - PROVIDER TOKENS
PROVIDER:[TOKEN:[[id number]]] PROVIDER:[TOKEN:[[id number]]] PROVIDER:[TOKEN:[[id number]]] PROVIDER:[TOKEN:[[id number]]]

## 2023-12-30 NOTE — PROGRESS NOTE ADULT - SUBJECTIVE AND OBJECTIVE BOX
Chief complaint:   Patient is a 58y old  Male who presents with a chief complaint of Elective R craniotomy for tumor resection (30 Dec 2023 00:42)    HPI:  58M with PMHx of HTN, HLD, melanoma x3 last resection 1 year ago never requiring systemic treatment who presented today after being altered, L facial droop, and HA. Per patient, his children have noticed him being "off" for the past 1 month with intermittent confusion and altered speech while talking with him on the phone. Yesterday, patient flew from [place] to visit family, and his children confirmed altered speech and facial droop which prompted patient to visit [facility] this am. Patient admits to severe HA 10/10 currently, has tried using extra strength tylenol without relief, and nausea this am now resolved. Patient also admits to worsening vision / blurred vision x1 month. Patient denies weakness, numbness, tingling, difficulty walking, weight loss, CP, SOB, N/V. CTH at [facility] showed 4.6x4.2 cm R frontal mass w/ additional  3.3x1.6cm adjacent lesion. Pt was transferred to [facility] for further neurosurgical evaluation. He had a CT head that showed right frontal abnormal lesion 4.6 x 4.2 cm + R lenticular nucleus 3.3 x 1.6 cm lesion.  He also had a MRI of the brain.  Patient followed up as an outpatient on 12/27/2023 and was scheduled for surgery on 12/29/23. (29 Dec 2023 13:16)      24hr EVENTS:  12/30 - [facility]    ROS: [ ]  Unable to assess due to mental status   All other systems negative    -----------------------------------------------------------------------------------------------------------------------------------------------------------------------------------  ICU Vital Signs Last 24 Hrs  T(C): 36.7 (30 Dec 2023 04:36), Max: 36.7 (29 Dec 2023 13:05)  T(F): 98.1 (30 Dec 2023 04:36), Max: 98.1 (30 Dec 2023 04:36)  HR: 77 (30 Dec 2023 08:00) (59 - 79)  BP: 132/92 (30 Dec 2023 08:00) (107/40 - 148/94)  BP(mean): 105 (30 Dec 2023 08:00) (57 - 109)  ABP: 131/75 (30 Dec 2023 08:00) (101/52 - 141/58)  ABP(mean): 94 (30 Dec 2023 08:00) (68 - 94)  RR: 16 (30 Dec 2023 08:00) (13 - 23)  SpO2: 95% (30 Dec 2023 08:00) (94% - 98%)    O2 Parameters below as of 30 Dec 2023 08:00  Patient On (Oxygen Delivery Method): room air            I&O's Summary    29 Dec 2023 07:01  -  30 Dec 2023 07:00  --------------------------------------------------------  IN: 2220 mL / OUT: 2750 mL / NET: -530 mL        MEDICATIONS  (STANDING):  ceFAZolin  Injectable. 2000 milliGRAM(s) IV Push every 8 hours  chlorhexidine 2% Cloths 1 Application(s) Topical daily  dexAMETHasone     Tablet 4 milliGRAM(s) Oral every 6 hours  enoxaparin Injectable 40 milliGRAM(s) SubCutaneous every 24 hours  levETIRAcetam 1000 milliGRAM(s) Oral two times a day  pantoprazole    Tablet 40 milliGRAM(s) Oral before breakfast  polyethylene glycol 3350 17 Gram(s) Oral daily  senna 2 Tablet(s) Oral at bedtime      RESPIRATORY:        IMAGING:   Recent imaging studies were reviewed.    LAB RESULTS:                          14.6   15.29 )-----------( 298      ( 30 Dec 2023 02:05 )             39.8       PT/INR - ( 30 Dec 2023 05:21 )   PT: 10.2 sec;   INR: 0.92 ratio         PTT - ( 30 Dec 2023 05:21 )  PTT:22.7 sec    12-30    136  |  98  |  15.3  ----------------------------<  121<H>  4.3   |  23.0  |  0.74    Ca    8.7      30 Dec 2023 02:05  Phos  4.4     12-30  Mg     2.2     12-30                  -----------------------------------------------------------------------------------------------------------------------------------------------------------------------------------    PHYSICAL EXAM:  General: Calm, intubated  HEENT: MMM  Neuro:  -Mental status- No acute distress  -CN- PERRL 3mm, EOMI, tongue midline, face symmetric    CV: RRR  Pulm: clear to auscultation  Abd: Soft, nontender, nondistended  Ext: no noted edema in lower ext  Skin: warm, dry       Chief complaint:   Patient is a 58y old  Male who presents with a chief complaint of Elective R craniotomy for tumor resection (30 Dec 2023 00:42)    HPI:  58M with PMHx of HTN, HLD, melanoma x3 last resection 1 year ago never requiring systemic treatment who presented today after being altered, L facial droop, and HA. Per patient, his children have noticed him being "off" for the past 1 month with intermittent confusion and altered speech while talking with him on the phone. Yesterday, patient flew from [place] to visit family, and his children confirmed altered speech and facial droop which prompted patient to visit [facility] this am. Patient admits to severe HA 10/10 currently, has tried using extra strength tylenol without relief, and nausea this am now resolved. Patient also admits to worsening vision / blurred vision x1 month. Patient denies weakness, numbness, tingling, difficulty walking, weight loss, CP, SOB, N/V. CTH at [facility] showed 4.6x4.2 cm R frontal mass w/ additional  3.3x1.6cm adjacent lesion. Pt was transferred to [facility] for further neurosurgical evaluation. He had a CT head that showed right frontal abnormal lesion 4.6 x 4.2 cm + R lenticular nucleus 3.3 x 1.6 cm lesion.  He also had a MRI of the brain.  Patient followed up as an outpatient on 12/27/2023 and was scheduled for surgery on 12/29/23. (29 Dec 2023 13:16)      24hr EVENTS:  12/30 - [facility]    ROS: [ ]  Unable to assess due to mental status   All other systems negative    -----------------------------------------------------------------------------------------------------------------------------------------------------------------------------------  ICU Vital Signs Last 24 Hrs  T(C): 36.7 (30 Dec 2023 04:36), Max: 36.7 (29 Dec 2023 13:05)  T(F): 98.1 (30 Dec 2023 04:36), Max: 98.1 (30 Dec 2023 04:36)  HR: 77 (30 Dec 2023 08:00) (59 - 79)  BP: 132/92 (30 Dec 2023 08:00) (107/40 - 148/94)  BP(mean): 105 (30 Dec 2023 08:00) (57 - 109)  ABP: 131/75 (30 Dec 2023 08:00) (101/52 - 141/58)  ABP(mean): 94 (30 Dec 2023 08:00) (68 - 94)  RR: 16 (30 Dec 2023 08:00) (13 - 23)  SpO2: 95% (30 Dec 2023 08:00) (94% - 98%)    O2 Parameters below as of 30 Dec 2023 08:00  Patient On (Oxygen Delivery Method): room air            I&O's Summary    29 Dec 2023 07:01  -  30 Dec 2023 07:00  --------------------------------------------------------  IN: 2220 mL / OUT: 2750 mL / NET: -530 mL        MEDICATIONS  (STANDING):  ceFAZolin  Injectable. 2000 milliGRAM(s) IV Push every 8 hours  chlorhexidine 2% Cloths 1 Application(s) Topical daily  dexAMETHasone     Tablet 4 milliGRAM(s) Oral every 6 hours  enoxaparin Injectable 40 milliGRAM(s) SubCutaneous every 24 hours  levETIRAcetam 1000 milliGRAM(s) Oral two times a day  pantoprazole    Tablet 40 milliGRAM(s) Oral before breakfast  polyethylene glycol 3350 17 Gram(s) Oral daily  senna 2 Tablet(s) Oral at bedtime      RESPIRATORY:        IMAGING:   Recent imaging studies were reviewed.    LAB RESULTS:                          14.6   15.29 )-----------( 298      ( 30 Dec 2023 02:05 )             39.8       PT/INR - ( 30 Dec 2023 05:21 )   PT: 10.2 sec;   INR: 0.92 ratio         PTT - ( 30 Dec 2023 05:21 )  PTT:22.7 sec    12-30    136  |  98  |  15.3  ----------------------------<  121<H>  4.3   |  23.0  |  0.74    Ca    8.7      30 Dec 2023 02:05  Phos  4.4     12-30  Mg     2.2     12-30                  -----------------------------------------------------------------------------------------------------------------------------------------------------------------------------------    PHYSICAL EXAM:  General: Calm, intubated  HEENT: MMM  Neuro:  -Mental status- No acute distress  -CN- PERRL 3mm, EOMI, tongue midline, face symmetric    CV: RRR  Pulm: clear to auscultation  Abd: Soft, nontender, nondistended  Ext: no noted edema in lower ext  Skin: warm, dry

## 2023-12-31 ENCOUNTER — TRANSCRIPTION ENCOUNTER (OUTPATIENT)
Age: 58
End: 2023-12-31

## 2023-12-31 VITALS
RESPIRATION RATE: 20 BRPM | SYSTOLIC BLOOD PRESSURE: 125 MMHG | HEART RATE: 68 BPM | DIASTOLIC BLOOD PRESSURE: 90 MMHG | OXYGEN SATURATION: 95 %

## 2023-12-31 LAB
ANION GAP SERPL CALC-SCNC: 12 MMOL/L — SIGNIFICANT CHANGE UP (ref 5–17)
ANION GAP SERPL CALC-SCNC: 12 MMOL/L — SIGNIFICANT CHANGE UP (ref 5–17)
BUN SERPL-MCNC: 14.8 MG/DL — SIGNIFICANT CHANGE UP (ref 8–20)
BUN SERPL-MCNC: 14.8 MG/DL — SIGNIFICANT CHANGE UP (ref 8–20)
CALCIUM SERPL-MCNC: 8.5 MG/DL — SIGNIFICANT CHANGE UP (ref 8.4–10.5)
CALCIUM SERPL-MCNC: 8.5 MG/DL — SIGNIFICANT CHANGE UP (ref 8.4–10.5)
CHLORIDE SERPL-SCNC: 98 MMOL/L — SIGNIFICANT CHANGE UP (ref 96–108)
CHLORIDE SERPL-SCNC: 98 MMOL/L — SIGNIFICANT CHANGE UP (ref 96–108)
CO2 SERPL-SCNC: 26 MMOL/L — SIGNIFICANT CHANGE UP (ref 22–29)
CO2 SERPL-SCNC: 26 MMOL/L — SIGNIFICANT CHANGE UP (ref 22–29)
CREAT SERPL-MCNC: 0.66 MG/DL — SIGNIFICANT CHANGE UP (ref 0.5–1.3)
CREAT SERPL-MCNC: 0.66 MG/DL — SIGNIFICANT CHANGE UP (ref 0.5–1.3)
EGFR: 108 ML/MIN/1.73M2 — SIGNIFICANT CHANGE UP
EGFR: 108 ML/MIN/1.73M2 — SIGNIFICANT CHANGE UP
GLUCOSE SERPL-MCNC: 141 MG/DL — HIGH (ref 70–99)
GLUCOSE SERPL-MCNC: 141 MG/DL — HIGH (ref 70–99)
HCT VFR BLD CALC: 40.4 % — SIGNIFICANT CHANGE UP (ref 39–50)
HCT VFR BLD CALC: 40.4 % — SIGNIFICANT CHANGE UP (ref 39–50)
HGB BLD-MCNC: 14.1 G/DL — SIGNIFICANT CHANGE UP (ref 13–17)
HGB BLD-MCNC: 14.1 G/DL — SIGNIFICANT CHANGE UP (ref 13–17)
MAGNESIUM SERPL-MCNC: 2.5 MG/DL — SIGNIFICANT CHANGE UP (ref 1.6–2.6)
MAGNESIUM SERPL-MCNC: 2.5 MG/DL — SIGNIFICANT CHANGE UP (ref 1.6–2.6)
MCHC RBC-ENTMCNC: 30.2 PG — SIGNIFICANT CHANGE UP (ref 27–34)
MCHC RBC-ENTMCNC: 30.2 PG — SIGNIFICANT CHANGE UP (ref 27–34)
MCHC RBC-ENTMCNC: 34.9 GM/DL — SIGNIFICANT CHANGE UP (ref 32–36)
MCHC RBC-ENTMCNC: 34.9 GM/DL — SIGNIFICANT CHANGE UP (ref 32–36)
MCV RBC AUTO: 86.5 FL — SIGNIFICANT CHANGE UP (ref 80–100)
MCV RBC AUTO: 86.5 FL — SIGNIFICANT CHANGE UP (ref 80–100)
PHOSPHATE SERPL-MCNC: 3 MG/DL — SIGNIFICANT CHANGE UP (ref 2.4–4.7)
PHOSPHATE SERPL-MCNC: 3 MG/DL — SIGNIFICANT CHANGE UP (ref 2.4–4.7)
PLATELET # BLD AUTO: 279 K/UL — SIGNIFICANT CHANGE UP (ref 150–400)
PLATELET # BLD AUTO: 279 K/UL — SIGNIFICANT CHANGE UP (ref 150–400)
POTASSIUM SERPL-MCNC: 4.5 MMOL/L — SIGNIFICANT CHANGE UP (ref 3.5–5.3)
POTASSIUM SERPL-MCNC: 4.5 MMOL/L — SIGNIFICANT CHANGE UP (ref 3.5–5.3)
POTASSIUM SERPL-SCNC: 4.5 MMOL/L — SIGNIFICANT CHANGE UP (ref 3.5–5.3)
POTASSIUM SERPL-SCNC: 4.5 MMOL/L — SIGNIFICANT CHANGE UP (ref 3.5–5.3)
RBC # BLD: 4.67 M/UL — SIGNIFICANT CHANGE UP (ref 4.2–5.8)
RBC # BLD: 4.67 M/UL — SIGNIFICANT CHANGE UP (ref 4.2–5.8)
RBC # FLD: 13.1 % — SIGNIFICANT CHANGE UP (ref 10.3–14.5)
RBC # FLD: 13.1 % — SIGNIFICANT CHANGE UP (ref 10.3–14.5)
SODIUM SERPL-SCNC: 135 MMOL/L — SIGNIFICANT CHANGE UP (ref 135–145)
SODIUM SERPL-SCNC: 135 MMOL/L — SIGNIFICANT CHANGE UP (ref 135–145)
WBC # BLD: 17.23 K/UL — HIGH (ref 3.8–10.5)
WBC # BLD: 17.23 K/UL — HIGH (ref 3.8–10.5)
WBC # FLD AUTO: 17.23 K/UL — HIGH (ref 3.8–10.5)
WBC # FLD AUTO: 17.23 K/UL — HIGH (ref 3.8–10.5)

## 2023-12-31 PROCEDURE — 97167 OT EVAL HIGH COMPLEX 60 MIN: CPT

## 2023-12-31 PROCEDURE — 36415 COLL VENOUS BLD VENIPUNCTURE: CPT

## 2023-12-31 PROCEDURE — 93005 ELECTROCARDIOGRAM TRACING: CPT

## 2023-12-31 PROCEDURE — 85610 PROTHROMBIN TIME: CPT

## 2023-12-31 PROCEDURE — 70450 CT HEAD/BRAIN W/O DYE: CPT

## 2023-12-31 PROCEDURE — 36430 TRANSFUSION BLD/BLD COMPNT: CPT

## 2023-12-31 PROCEDURE — C9399: CPT

## 2023-12-31 PROCEDURE — 70553 MRI BRAIN STEM W/O & W/DYE: CPT

## 2023-12-31 PROCEDURE — 87640 STAPH A DNA AMP PROBE: CPT

## 2023-12-31 PROCEDURE — 86900 BLOOD TYPING SEROLOGIC ABO: CPT

## 2023-12-31 PROCEDURE — 88360 TUMOR IMMUNOHISTOCHEM/MANUAL: CPT

## 2023-12-31 PROCEDURE — 84484 ASSAY OF TROPONIN QUANT: CPT

## 2023-12-31 PROCEDURE — 88331 PATH CONSLTJ SURG 1 BLK 1SPC: CPT

## 2023-12-31 PROCEDURE — 88307 TISSUE EXAM BY PATHOLOGIST: CPT

## 2023-12-31 PROCEDURE — C1889: CPT

## 2023-12-31 PROCEDURE — 83735 ASSAY OF MAGNESIUM: CPT

## 2023-12-31 PROCEDURE — 86901 BLOOD TYPING SEROLOGIC RH(D): CPT

## 2023-12-31 PROCEDURE — 86923 COMPATIBILITY TEST ELECTRIC: CPT

## 2023-12-31 PROCEDURE — 88342 IMHCHEM/IMCYTCHM 1ST ANTB: CPT

## 2023-12-31 PROCEDURE — 84100 ASSAY OF PHOSPHORUS: CPT

## 2023-12-31 PROCEDURE — 88334 PATH CONSLTJ SURG CYTO XM EA: CPT

## 2023-12-31 PROCEDURE — 86850 RBC ANTIBODY SCREEN: CPT

## 2023-12-31 PROCEDURE — C1713: CPT

## 2023-12-31 PROCEDURE — C1769: CPT

## 2023-12-31 PROCEDURE — 80048 BASIC METABOLIC PNL TOTAL CA: CPT

## 2023-12-31 PROCEDURE — 88341 IMHCHEM/IMCYTCHM EA ADD ANTB: CPT

## 2023-12-31 PROCEDURE — 82550 ASSAY OF CK (CPK): CPT

## 2023-12-31 PROCEDURE — 85027 COMPLETE CBC AUTOMATED: CPT

## 2023-12-31 PROCEDURE — 82962 GLUCOSE BLOOD TEST: CPT

## 2023-12-31 PROCEDURE — 87641 MR-STAPH DNA AMP PROBE: CPT

## 2023-12-31 PROCEDURE — 85730 THROMBOPLASTIN TIME PARTIAL: CPT

## 2023-12-31 PROCEDURE — 99233 SBSQ HOSP IP/OBS HIGH 50: CPT

## 2023-12-31 PROCEDURE — P9016: CPT

## 2023-12-31 RX ORDER — DEXTROSE 50 % IN WATER 50 %
12.5 SYRINGE (ML) INTRAVENOUS ONCE
Refills: 0 | Status: DISCONTINUED | OUTPATIENT
Start: 2023-12-31 | End: 2023-12-31

## 2023-12-31 RX ORDER — SODIUM CHLORIDE 9 MG/ML
1000 INJECTION, SOLUTION INTRAVENOUS
Refills: 0 | Status: DISCONTINUED | OUTPATIENT
Start: 2023-12-31 | End: 2023-12-31

## 2023-12-31 RX ORDER — GLUCAGON INJECTION, SOLUTION 0.5 MG/.1ML
1 INJECTION, SOLUTION SUBCUTANEOUS ONCE
Refills: 0 | Status: DISCONTINUED | OUTPATIENT
Start: 2023-12-31 | End: 2023-12-31

## 2023-12-31 RX ORDER — HYDROCHLOROTHIAZIDE 25 MG
1 TABLET ORAL
Refills: 0 | DISCHARGE

## 2023-12-31 RX ORDER — OXYCODONE HYDROCHLORIDE 5 MG/1
1 TABLET ORAL
Qty: 20 | Refills: 0
Start: 2023-12-31 | End: 2024-01-04

## 2023-12-31 RX ORDER — DEXTROSE 50 % IN WATER 50 %
25 SYRINGE (ML) INTRAVENOUS ONCE
Refills: 0 | Status: DISCONTINUED | OUTPATIENT
Start: 2023-12-31 | End: 2023-12-31

## 2023-12-31 RX ORDER — LEVETIRACETAM 250 MG/1
1 TABLET, FILM COATED ORAL
Qty: 60 | Refills: 0
Start: 2023-12-31 | End: 2024-01-29

## 2023-12-31 RX ORDER — DEXAMETHASONE 0.5 MG/5ML
1 ELIXIR ORAL
Qty: 56 | Refills: 0
Start: 2023-12-31 | End: 2024-01-13

## 2023-12-31 RX ORDER — ACETAMINOPHEN 500 MG
1000 TABLET ORAL ONCE
Refills: 0 | Status: COMPLETED | OUTPATIENT
Start: 2023-12-31 | End: 2023-12-31

## 2023-12-31 RX ORDER — PANTOPRAZOLE SODIUM 20 MG/1
1 TABLET, DELAYED RELEASE ORAL
Qty: 20 | Refills: 0
Start: 2023-12-31 | End: 2024-01-19

## 2023-12-31 RX ORDER — INSULIN LISPRO 100/ML
VIAL (ML) SUBCUTANEOUS
Refills: 0 | Status: DISCONTINUED | OUTPATIENT
Start: 2023-12-31 | End: 2023-12-31

## 2023-12-31 RX ORDER — DEXTROSE 50 % IN WATER 50 %
15 SYRINGE (ML) INTRAVENOUS ONCE
Refills: 0 | Status: DISCONTINUED | OUTPATIENT
Start: 2023-12-31 | End: 2023-12-31

## 2023-12-31 RX ADMIN — Medication 400 MILLIGRAM(S): at 01:43

## 2023-12-31 RX ADMIN — Medication 4 MILLIGRAM(S): at 05:20

## 2023-12-31 RX ADMIN — LEVETIRACETAM 1000 MILLIGRAM(S): 250 TABLET, FILM COATED ORAL at 05:21

## 2023-12-31 RX ADMIN — CHLORHEXIDINE GLUCONATE 1 APPLICATION(S): 213 SOLUTION TOPICAL at 05:21

## 2023-12-31 RX ADMIN — Medication 1000 MILLIGRAM(S): at 02:15

## 2023-12-31 RX ADMIN — PANTOPRAZOLE SODIUM 40 MILLIGRAM(S): 20 TABLET, DELAYED RELEASE ORAL at 05:20

## 2023-12-31 RX ADMIN — OXYCODONE HYDROCHLORIDE 10 MILLIGRAM(S): 5 TABLET ORAL at 00:00

## 2023-12-31 NOTE — PROGRESS NOTE ADULT - NS ATTEND AMEND GEN_ALL_CORE FT
I agree with the above. I personally examined and saw the patient. Baseline left facial droop but otherwise intact. Surgical site c/d/i. Incision instructions given. Steroids 2 weeks to off. DC home today after PT. Outpatient follow-up in 2 weeks.
I agree with the above. I personally examined and saw the patient. Left facial weakness but otherwise intact. Family feels like his memory and cognition are improved already. Plan as above.
I agree with the above. I personally examined and saw the patient. Baseline left lower facial droop but otherwise intact. Feeling more clear. Head CT done overnight with postsurgical changes. q4 neurochecks today, drain removed, completion of antibiotics, steroids 4q6, PPI, insulin, diet ok, PT/OT, will remain in dark until tomorrow, possible DC home tomorrow.

## 2023-12-31 NOTE — PROGRESS NOTE ADULT - REASON FOR ADMISSION
Elective R craniotomy for tumor resection

## 2023-12-31 NOTE — PHYSICAL THERAPY INITIAL EVALUATION ADULT - LEVEL OF INDEPENDENCE: SUPINE/SIT, REHAB EVAL
Show Aperture Variable?: Yes
Number Of Freeze-Thaw Cycles: 1 freeze-thaw cycle
Duration Of Freeze Thaw-Cycle (Seconds): 5
Consent: The patient's consent was obtained including but not limited to risks of crusting, scabbing, blistering, scarring, darker or lighter pigmentary change, recurrence, incomplete removal and infection.
Detail Level: Detailed
Post-Care Instructions: I reviewed with the patient in detail post-care instructions. Patient is to wear sunprotection, and avoid picking at any of the treated lesions. Pt may apply Vaseline to crusted or scabbing areas.
Render Note In Bullet Format When Appropriate: No
independent

## 2023-12-31 NOTE — OCCUPATIONAL THERAPY INITIAL EVALUATION ADULT - PERTINENT HX OF CURRENT PROBLEM, REHAB EVAL
As per MD note: 58M with PMHx of HTN, HLD, melanoma x3 last resection 1 year ago never requiring systemic treatment who presented today after being altered, L facial droop, and HA. Per patient, his children have noticed him being "off" for the past 1 month with intermittent confusion and altered speech while talking with him on the phone. Yesterday, patient flew from [place] to visit family, and his children confirmed altered speech and facial droop which prompted patient to visit [facility] this am. Patient admits to severe HA 10/10 currently, has tried using extra strength tylenol without relief, and nausea this am now resolved. Patient also admits to worsening vision / blurred vision x1 month. Patient denies weakness, numbness, tingling, difficulty walking, weight loss, CP, SOB, N/V. CTH at [facility] showed 4.6x4.2 cm R frontal mass w/ additional  3.3x1.6cm adjacent lesion. Pt was transferred to [facility] for further neurosurgical evaluation. He had a CT head that showed right frontal abnormal lesion 4.6 x 4.2 cm + R lenticular nucleus 3.3 x 1.6 cm lesion.  He also had a MRI of the brain.  Patient followed up as an outpatient on 12/27/2023 and was scheduled for surgery on 12/29/23. As per MD note: 58M with PMHx of HTN, HLD, melanoma x3 last resection 1 year ago never requiring systemic treatment who presented today after being altered, L facial droop, and HA. Per patient, his children have noticed him being "off" for the past 1 month with intermittent confusion and altered speech while talking with him on the phone. Yesterday, patient flew from [place] to visit family, and his children confirmed altered speech and facial droop which prompted patient to visit [facility] [place]. Patient admits to severe HA 10/10 currently, has tried using extra strength tylenol without relief, and nausea this am now resolved. Patient also admits to worsening vision / blurred vision x1 month. Patient denies weakness, numbness, tingling, difficulty walking, weight loss, CP, SOB, N/V. CTH at [facility] showed 4.6x4.2 cm R frontal mass w/ additional  3.3x1.6cm adjacent lesion. Pt was transferred to [facility] for further neurosurgical evaluation. He had a CT head that showed right frontal abnormal lesion 4.6 x 4.2 cm + R lenticular nucleus 3.3 x 1.6 cm lesion.  He also had a MRI of the brain.  Patient followed up as an outpatient on 12/27/2023 and was scheduled for surgery on 12/29/23.

## 2023-12-31 NOTE — PHYSICAL THERAPY INITIAL EVALUATION ADULT - PATIENT/FAMILY/SIGNIFICANT OTHER GOALS STATEMENT, PT EVAL
to go back to work [employer] Pregnancy And Lactation Text: This medication is considered safe during pregnancy and breast feeding.

## 2023-12-31 NOTE — DISCHARGE NOTE NURSING/CASE MANAGEMENT/SOCIAL WORK - NSDCPEFALRISK_GEN_ALL_CORE
For information on Fall & Injury Prevention, visit: [email or web address] OR  [email or web address] OR  https://www.cdc.gov/steadi/patient.html For information on Fall & Injury Prevention, visit: [email or web address] OR  [email or web address] OR  https://www.cdc.gov/steadi/patient.html

## 2023-12-31 NOTE — PROGRESS NOTE ADULT - SUBJECTIVE AND OBJECTIVE BOX
INTERVAL HPI/ACUTE EVENTS:  57yo male s/p R craniotomy for brain tumor resection, now POD#2. Patient seen lying in bed. Complaining of headache.    ICU Vital Signs Last 24 Hrs  T(C): 37.2 (30 Dec 2023 23:32), Max: 37.2 (30 Dec 2023 23:32)  T(F): 98.9 (30 Dec 2023 23:32), Max: 98.9 (30 Dec 2023 23:32)  HR: 65 (31 Dec 2023 02:00) (60 - 85)  BP: 124/90 (31 Dec 2023 02:00) (115/81 - 148/100)  BP(mean): 101 (31 Dec 2023 02:00) (78 - 114)  ABP: 131/75 (30 Dec 2023 08:00) (128/62 - 140/68)  ABP(mean): 94 (30 Dec 2023 08:00) (82 - 94)  RR: 15 (31 Dec 2023 02:00) (15 - 23)  SpO2: 94% (31 Dec 2023 02:00) (93% - 98%)    O2 Parameters below as of 31 Dec 2023 02:00  Patient On (Oxygen Delivery Method): room air      PHYSICAL EXAM:  GENERAL: NAD, well-groomed  HEAD:  S/p R crani. Head wrap w/ kerlex in place  WOUND: Dressing clean dry intact  CECILY COMA SCORE: E-4 V-5 M-6 = 15  MENTAL STATUS: AAO x3; Awake; Opens eyes spontaneously; Appropriately conversant without aphasia; following simple commands  CRANIAL NERVES: PERRL, EOMI without nystagmus. Facial sensation intact V1-3 distribution b/l. Lower left facial droop (present preop). Tongue midline. Hearing grossly intact. Speech clear.    MOTOR: strength 5/5 b/l upper and lower extremities  SENSATION: grossly intact to light touch all extremities  COORDINATION: Gait not assessed  CHEST/LUNG: Non-labored breathing    LABS:                                   14.1   17.23 )-----------( 279      ( 31 Dec 2023 01:57 )             40.4   12-30    136  |  98  |  15.3  ----------------------------<  121<H>  4.3   |  23.0  |  0.74    Ca    8.7      30 Dec 2023 02:05  Phos  4.4     12-30  Mg     2.2     12-30                 Urinalysis Basic - ( 29 Dec 2023 15:10 )    Color: x / Appearance: x / SG: x / pH: x  Gluc: 120 mg/dL / Ketone: x  / Bili: x / Urobili: x   Blood: x / Protein: x / Nitrite: x   Leuk Esterase: x / RBC: x / WBC x   Sq Epi: x / Non Sq Epi: x / Bacteria: x    PT/INR - ( 30 Dec 2023 05:21 )   PT: 10.2 sec;   INR: 0.92 ratio         PTT - ( 30 Dec 2023 05:21 )  PTT:22.7 sec    I&O's Summary    29 Dec 2023 07:01  -  30 Dec 2023 07:00  --------------------------------------------------------  IN: 2220 mL / OUT: 2750 mL / NET: -530 mL    30 Dec 2023 07:01  -  31 Dec 2023 02:22  --------------------------------------------------------  IN: 1040 mL / OUT: 60 mL / NET: 980 mL        RADIOLOGY/OTHER:    < from: MR Brain Stereotactic w/wo IV Cont (12.22.23 @ 18:41) >    There is a heterogeneous mass lesion in the right frontal lobe, centered   in white matter/periventricular location with central necrosis and   irregular peripheral enhancement. This measures 3.8 x 3.9 x 2.6 cm   (transverse x AP x CC; series 6, image 95). Beyond the dominant mass   there are satellite foci of enhancement at its posterior aspect   (measuring up to 6 mm on image 100, and there are separate nodular and   minimally necrotic nodules at the right insula, measuring up to 11 mm on   image 107. Inferior to the dominant nodule is a cystic area with thinner   peripheral enhancement, cyst examination measuring 2.8 x 3.6 x 1.5 cm.   Lateral to this there is linear plaque-like multifocal enhancement (image   116) suspicious for local leptomeningeal deposition.    Larger zone of masslike and nonenhancing FLAIR signal diffusely involves   the right basal ganglia, as well as cortical sites in the right inferior   frontal lobe,the entirety of the insula, and a portion of the anterior   and central temporal lobe. Minimal extension across midline at the   hypothalamus level is suggested with tumor signal at right optic tract   level and equivocal at anterior commissure. No extension through corpus   callosum.    Considerable mass effect includes effacement of the right lateral   ventricle as well as the third ventricle. Right temporal horn is effaced   from nonenhancing tumor but not dilated or entrapment. Midline shift to   the left at septum pellucidum level is measured at 8 mm there is similar   to the recent CT. No transtentorial downward herniation.    No additional mass or signal abnormality in the left cerebral hemisphere,   the brainstem or either cerebellar hemisphere.    Diffusion imaging is negative for recent infarct. Peripheral hemorrhage   signal is identified in the dominant mass and minimally at the inferior   cystic focus.    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 Larger zone FLAIR signal involves basal ganglia, insula and   further sites of frontal and temporal lobes.    Likely diagnosis is high-grade glioma; correlate with surgical pathology.    --- End of Report ---    < from: CT Head No Cont (12.29.23 @ 06:19) >  There is a cystic lesion in the region of the right caudate head which   does not demonstrate diffusion restriction on MRI or thin rim   enhancement, therefore not likely being an abscess. On CT this measures   4.1 cm in AP diameter by 4.2 cm transversely by 4.6 cmin craniocaudal   diameter and appears bilobed on the coronal imaging. There is mass effect   on the frontal horn of the right lateral ventricle and midline shift to   the left. There is a small amount of vasogenic edema. This likely   represents a neoplasm and may represent a primary or secondary neoplasm   based on its appearance. There is no hydrocephalus.      Impression: Cystic appearing right caudate head mass with mass effect and   vasogenic edema. Mild midline shift to the left likely represents a   neoplasm, primary or secondary. No significant change since 12/22/2023.    --- End of Report ---      < from: MR Head w/wo IV Cont (12.30.23 @ 16:05) >  IMPRESSION:  Partial resection of heterogeneous mass in RIGHT basal   ganglia with residual surgical cavity containing fluid and hemorrhage.   Continued effacement of RIGHT lateral ventricle and 3 mm subfalcine   herniation to the LEFT.    --- End of Report ---            TAHIR RITCHIE MD; Attending Radiologist  This document has been electronically signed. Dec 30 2023  6:23PM    < end of copied text >     INTERVAL HPI/ACUTE EVENTS:  59yo male s/p R craniotomy for brain tumor resection, now POD#2. Patient seen lying in bed. Complaining of headache.    ICU Vital Signs Last 24 Hrs  T(C): 37.2 (30 Dec 2023 23:32), Max: 37.2 (30 Dec 2023 23:32)  T(F): 98.9 (30 Dec 2023 23:32), Max: 98.9 (30 Dec 2023 23:32)  HR: 65 (31 Dec 2023 02:00) (60 - 85)  BP: 124/90 (31 Dec 2023 02:00) (115/81 - 148/100)  BP(mean): 101 (31 Dec 2023 02:00) (78 - 114)  ABP: 131/75 (30 Dec 2023 08:00) (128/62 - 140/68)  ABP(mean): 94 (30 Dec 2023 08:00) (82 - 94)  RR: 15 (31 Dec 2023 02:00) (15 - 23)  SpO2: 94% (31 Dec 2023 02:00) (93% - 98%)    O2 Parameters below as of 31 Dec 2023 02:00  Patient On (Oxygen Delivery Method): room air      PHYSICAL EXAM:  GENERAL: NAD, well-groomed  HEAD:  S/p R crani. Head wrap w/ kerlex in place  WOUND: Dressing clean dry intact  CECILY COMA SCORE: E-4 V-5 M-6 = 15  MENTAL STATUS: AAO x3; Awake; Opens eyes spontaneously; Appropriately conversant without aphasia; following simple commands  CRANIAL NERVES: PERRL, EOMI without nystagmus. Facial sensation intact V1-3 distribution b/l. Lower left facial droop (present preop). Tongue midline. Hearing grossly intact. Speech clear.    MOTOR: strength 5/5 b/l upper and lower extremities  SENSATION: grossly intact to light touch all extremities  COORDINATION: Gait not assessed  CHEST/LUNG: Non-labored breathing    LABS:                                   14.1   17.23 )-----------( 279      ( 31 Dec 2023 01:57 )             40.4   12-30    136  |  98  |  15.3  ----------------------------<  121<H>  4.3   |  23.0  |  0.74    Ca    8.7      30 Dec 2023 02:05  Phos  4.4     12-30  Mg     2.2     12-30                 Urinalysis Basic - ( 29 Dec 2023 15:10 )    Color: x / Appearance: x / SG: x / pH: x  Gluc: 120 mg/dL / Ketone: x  / Bili: x / Urobili: x   Blood: x / Protein: x / Nitrite: x   Leuk Esterase: x / RBC: x / WBC x   Sq Epi: x / Non Sq Epi: x / Bacteria: x    PT/INR - ( 30 Dec 2023 05:21 )   PT: 10.2 sec;   INR: 0.92 ratio         PTT - ( 30 Dec 2023 05:21 )  PTT:22.7 sec    I&O's Summary    29 Dec 2023 07:01  -  30 Dec 2023 07:00  --------------------------------------------------------  IN: 2220 mL / OUT: 2750 mL / NET: -530 mL    30 Dec 2023 07:01  -  31 Dec 2023 02:22  --------------------------------------------------------  IN: 1040 mL / OUT: 60 mL / NET: 980 mL        RADIOLOGY/OTHER:    < from: MR Brain Stereotactic w/wo IV Cont (12.22.23 @ 18:41) >    There is a heterogeneous mass lesion in the right frontal lobe, centered   in white matter/periventricular location with central necrosis and   irregular peripheral enhancement. This measures 3.8 x 3.9 x 2.6 cm   (transverse x AP x CC; series 6, image 95). Beyond the dominant mass   there are satellite foci of enhancement at its posterior aspect   (measuring up to 6 mm on image 100, and there are separate nodular and   minimally necrotic nodules at the right insula, measuring up to 11 mm on   image 107. Inferior to the dominant nodule is a cystic area with thinner   peripheral enhancement, cyst examination measuring 2.8 x 3.6 x 1.5 cm.   Lateral to this there is linear plaque-like multifocal enhancement (image   116) suspicious for local leptomeningeal deposition.    Larger zone of masslike and nonenhancing FLAIR signal diffusely involves   the right basal ganglia, as well as cortical sites in the right inferior   frontal lobe,the entirety of the insula, and a portion of the anterior   and central temporal lobe. Minimal extension across midline at the   hypothalamus level is suggested with tumor signal at right optic tract   level and equivocal at anterior commissure. No extension through corpus   callosum.    Considerable mass effect includes effacement of the right lateral   ventricle as well as the third ventricle. Right temporal horn is effaced   from nonenhancing tumor but not dilated or entrapment. Midline shift to   the left at septum pellucidum level is measured at 8 mm there is similar   to the recent CT. No transtentorial downward herniation.    No additional mass or signal abnormality in the left cerebral hemisphere,   the brainstem or either cerebellar hemisphere.    Diffusion imaging is negative for recent infarct. Peripheral hemorrhage   signal is identified in the dominant mass and minimally at the inferior   cystic focus.    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 Larger zone FLAIR signal involves basal ganglia, insula and   further sites of frontal and temporal lobes.    Likely diagnosis is high-grade glioma; correlate with surgical pathology.    --- End of Report ---    < from: CT Head No Cont (12.29.23 @ 06:19) >  There is a cystic lesion in the region of the right caudate head which   does not demonstrate diffusion restriction on MRI or thin rim   enhancement, therefore not likely being an abscess. On CT this measures   4.1 cm in AP diameter by 4.2 cm transversely by 4.6 cmin craniocaudal   diameter and appears bilobed on the coronal imaging. There is mass effect   on the frontal horn of the right lateral ventricle and midline shift to   the left. There is a small amount of vasogenic edema. This likely   represents a neoplasm and may represent a primary or secondary neoplasm   based on its appearance. There is no hydrocephalus.      Impression: Cystic appearing right caudate head mass with mass effect and   vasogenic edema. Mild midline shift to the left likely represents a   neoplasm, primary or secondary. No significant change since 12/22/2023.    --- End of Report ---      < from: MR Head w/wo IV Cont (12.30.23 @ 16:05) >  IMPRESSION:  Partial resection of heterogeneous mass in RIGHT basal   ganglia with residual surgical cavity containing fluid and hemorrhage.   Continued effacement of RIGHT lateral ventricle and 3 mm subfalcine   herniation to the LEFT.    --- End of Report ---            TAHIR RITCHIE MD; Attending Radiologist  This document has been electronically signed. Dec 30 2023  6:23PM    < end of copied text >

## 2023-12-31 NOTE — PROGRESS NOTE ADULT - ASSESSMENT
Assessment:   58M PMHx HTN, HLD, melanoma x3 (last resection 2022) originally presented to [facility] on 12/22/23 with 1 month of confusion, altered speech, L facial droop and HA. Now s/p elective R craniotomy for tumor resection    PLAN:  - Q4h neuro checks  - MRI w/wo today  - Pain control prn, avoid oversedation  - Normotensive  - Keppra 1000mg BID  - Decadron taper  - Normal diet  - Bowel regimen: Senna, Miralax  - VTE prophylaxis: SCDs, SQL  - Activity: PT, OT   Assessment:   58M PMHx HTN, HLD, melanoma x3 (last resection 2022) originally presented to [facility] on 12/22/23 with 1 month of confusion, altered speech, L facial droop and HA. Now s/p elective R craniotomy for tumor resection    PLAN:  - Q4h neuro checks  - MRI w/wo today  - Pain control prn, avoid oversedation  - Normotensive  - Keppra 1000mg BID  - Decadron taper  - Normal diet  - Bowel regimen: Senna, Miralax  - VTE prophylaxis: SCDs, SQL  - Activity: PT, OT

## 2023-12-31 NOTE — PROGRESS NOTE ADULT - SUBJECTIVE AND OBJECTIVE BOX
Chief complaint:   Patient is a 58y old  Male who presents with a chief complaint of Elective R craniotomy for tumor resection (31 Dec 2023 02:20)    HPI:  58M with PMHx of HTN, HLD, melanoma x3 last resection 1 year ago never requiring systemic treatment who presented today after being altered, L facial droop, and HA. Per patient, his children have noticed him being "off" for the past 1 month with intermittent confusion and altered speech while talking with him on the phone. Yesterday, patient flew from [place] to visit family, and his children confirmed altered speech and facial droop which prompted patient to visit [facility] this am. Patient admits to severe HA 10/10 currently, has tried using extra strength tylenol without relief, and nausea this am now resolved. Patient also admits to worsening vision / blurred vision x1 month. Patient denies weakness, numbness, tingling, difficulty walking, weight loss, CP, SOB, N/V. CTH at [facility] showed 4.6x4.2 cm R frontal mass w/ additional  3.3x1.6cm adjacent lesion. Pt was transferred to [facility] for further neurosurgical evaluation. He had a CT head that showed right frontal abnormal lesion 4.6 x 4.2 cm + R lenticular nucleus 3.3 x 1.6 cm lesion.  He also had a MRI of the brain.  Patient followed up as an outpatient on 12/27/2023 and was scheduled for surgery on 12/29/23. (29 Dec 2023 13:16)        24hr EVENTS:      ROS: [ ]  Unable to assess due to mental status   All other systems negative    -----------------------------------------------------------------------------------------------------------------------------------------------------------------------------------  ICU Vital Signs Last 24 Hrs  T(C): 37.2 (30 Dec 2023 23:32), Max: 37.2 (30 Dec 2023 23:32)  T(F): 98.9 (30 Dec 2023 23:32), Max: 98.9 (30 Dec 2023 23:32)  HR: 63 (31 Dec 2023 07:00) (59 - 85)  BP: 126/94 (31 Dec 2023 07:00) (100/63 - 148/100)  BP(mean): 104 (31 Dec 2023 07:00) (77 - 114)  ABP: --  ABP(mean): --  RR: 15 (31 Dec 2023 07:00) (15 - 23)  SpO2: 95% (31 Dec 2023 07:00) (93% - 98%)    O2 Parameters below as of 31 Dec 2023 06:00  Patient On (Oxygen Delivery Method): room air            I&O's Summary    30 Dec 2023 07:01  -  31 Dec 2023 07:00  --------------------------------------------------------  IN: 1280 mL / OUT: 960 mL / NET: 320 mL        MEDICATIONS  (STANDING):  chlorhexidine 2% Cloths 1 Application(s) Topical daily  dexAMETHasone     Tablet 4 milliGRAM(s) Oral every 6 hours  enoxaparin Injectable 40 milliGRAM(s) SubCutaneous every 24 hours  levETIRAcetam 1000 milliGRAM(s) Oral two times a day  pantoprazole    Tablet 40 milliGRAM(s) Oral before breakfast  polyethylene glycol 3350 17 Gram(s) Oral daily  senna 2 Tablet(s) Oral at bedtime      RESPIRATORY:        IMAGING:   Recent imaging studies were reviewed.    LAB RESULTS:                          14.1   17.23 )-----------( 279      ( 31 Dec 2023 01:57 )             40.4       PT/INR - ( 30 Dec 2023 05:21 )   PT: 10.2 sec;   INR: 0.92 ratio         PTT - ( 30 Dec 2023 05:21 )  PTT:22.7 sec    12-31    135  |  98  |  14.8  ----------------------------<  141<H>  4.5   |  26.0  |  0.66    Ca    8.5      31 Dec 2023 01:57  Phos  3.0     12-31  Mg     2.5     12-31                  -----------------------------------------------------------------------------------------------------------------------------------------------------------------------------------  PHYSICAL EXAM:  General: Calm, laying in bed  HEENT: MMM  Neuro:  -Mental status- No acute distress, AOx3, conversational, following commands  mild dysarthria  -CN- PERRL 3mm, EOMI, tongue midline, face symmetric  -Motor- full strength in all ext  -Sensation- intact to LT   -Coordination- no dysmetria noted    CV: RRR  Pulm: Clear to auscultation  Abd: Soft, nontender, nondistended  Ext: No edema  Skin: warm, dry     Chief complaint:   Patient is a 58y old  Male who presents with a chief complaint of Elective R craniotomy for tumor resection (31 Dec 2023 02:20)    HPI:  58M with PMHx of HTN, HLD, melanoma x3 last resection 1 year ago never requiring systemic treatment who presented today after being altered, L facial droop, and HA. Per patient, his children have noticed him being "off" for the past 1 month with intermittent confusion and altered speech while talking with him on the phone. Yesterday, patient flew from [place] to visit family, and his children confirmed altered speech and facial droop which prompted patient to visit [facility] this am. Patient admits to severe HA 10/10 currently, has tried using extra strength tylenol without relief, and nausea this am now resolved. Patient also admits to worsening vision / blurred vision x1 month. Patient denies weakness, numbness, tingling, difficulty walking, weight loss, CP, SOB, N/V. CTH at [facility] showed 4.6x4.2 cm R frontal mass w/ additional  3.3x1.6cm adjacent lesion. Pt was transferred to [facility] for further neurosurgical evaluation. He had a CT head that showed right frontal abnormal lesion 4.6 x 4.2 cm + R lenticular nucleus 3.3 x 1.6 cm lesion.  He also had a MRI of the brain.  Patient followed up as an outpatient on 12/27/2023 and was scheduled for surgery on 12/29/23. (29 Dec 2023 13:16)        24hr EVENTS:      ROS: [ ]  Unable to assess due to mental status   All other systems negative    -----------------------------------------------------------------------------------------------------------------------------------------------------------------------------------  ICU Vital Signs Last 24 Hrs  T(C): 37.2 (30 Dec 2023 23:32), Max: 37.2 (30 Dec 2023 23:32)  T(F): 98.9 (30 Dec 2023 23:32), Max: 98.9 (30 Dec 2023 23:32)  HR: 63 (31 Dec 2023 07:00) (59 - 85)  BP: 126/94 (31 Dec 2023 07:00) (100/63 - 148/100)  BP(mean): 104 (31 Dec 2023 07:00) (77 - 114)  ABP: --  ABP(mean): --  RR: 15 (31 Dec 2023 07:00) (15 - 23)  SpO2: 95% (31 Dec 2023 07:00) (93% - 98%)    O2 Parameters below as of 31 Dec 2023 06:00  Patient On (Oxygen Delivery Method): room air            I&O's Summary    30 Dec 2023 07:01  -  31 Dec 2023 07:00  --------------------------------------------------------  IN: 1280 mL / OUT: 960 mL / NET: 320 mL        MEDICATIONS  (STANDING):  chlorhexidine 2% Cloths 1 Application(s) Topical daily  dexAMETHasone     Tablet 4 milliGRAM(s) Oral every 6 hours  enoxaparin Injectable 40 milliGRAM(s) SubCutaneous every 24 hours  levETIRAcetam 1000 milliGRAM(s) Oral two times a day  pantoprazole    Tablet 40 milliGRAM(s) Oral before breakfast  polyethylene glycol 3350 17 Gram(s) Oral daily  senna 2 Tablet(s) Oral at bedtime      RESPIRATORY:        IMAGING:   Recent imaging studies were reviewed.    LAB RESULTS:                          14.1   17.23 )-----------( 279      ( 31 Dec 2023 01:57 )             40.4       PT/INR - ( 30 Dec 2023 05:21 )   PT: 10.2 sec;   INR: 0.92 ratio         PTT - ( 30 Dec 2023 05:21 )  PTT:22.7 sec    12-31    135  |  98  |  14.8  ----------------------------<  141<H>  4.5   |  26.0  |  0.66    Ca    8.5      31 Dec 2023 01:57  Phos  3.0     12-31  Mg     2.5     12-31                  -----------------------------------------------------------------------------------------------------------------------------------------------------------------------------------  PHYSICAL EXAM:  General: Calm, laying in bed  HEENT: MMM  Neuro:  -Mental status- No acute distress, AOx3, conversational, following commands  mild dysarthria  -CN- PERRL 3mm, EOMI, tongue midline, face symmetric  -Motor- full strength in all ext  -Sensation- intact to LT   -Coordination- no dysmetria noted    CV: RRR  Pulm: Clear to auscultation  Abd: Soft, nontender, nondistended  Ext: No edema  Skin: warm, dry

## 2023-12-31 NOTE — DISCHARGE NOTE NURSING/CASE MANAGEMENT/SOCIAL WORK - PATIENT PORTAL LINK FT
You can access the FollowMyHealth Patient Portal offered by [facility] by registering at the following website: [email or web address]. By joining [facility]’s FollowMyHealth portal, you will also be able to view your health information using other applications (apps) compatible with our system. You can access the FollowMyHealth Patient Portal offered by [facility] by registering at the following website: [email or web address]. By joining [facility]’s FollowMyHealth portal, you will also be able to view your health information using other applications (apps) compatible with our system.

## 2023-12-31 NOTE — PHYSICAL THERAPY INITIAL EVALUATION ADULT - PERTINENT HX OF CURRENT PROBLEM, REHAB EVAL
58M with PMHx of HTN, HLD, melanoma x3 last resection 1 year ago never requiring systemic treatment who presented today after being altered, L facial droop, and HA. Per patient, his children have noticed him being "off" for the past 1 month with intermittent confusion and altered speech while talking with him on the phone. Yesterday, patient flew from [place] to visit family, and his children confirmed altered speech and facial droop which prompted patient to visit [facility] this am. Patient admits to severe HA 10/10 currently, has tried using extra strength tylenol without relief, and nausea this am now resolved. Patient also admits to worsening vision / blurred vision x1 month. Patient denies weakness, numbness, tingling, difficulty walking, weight loss, CP, SOB, N/V. CTH at [facility] showed 4.6x4.2 cm R frontal mass w/ additional  3.3x1.6cm adjacent lesion. Pt was transferred to [facility] for further neurosurgical evaluation. He had a CT head that showed right frontal abnormal lesion 4.6 x 4.2 cm + R lenticular nucleus 3.3 x 1.6 cm lesion.  He also had a MRI of the brain. 58M with PMHx of HTN, HLD, melanoma x3 last resection 1 year ago never requiring systemic treatment who presented today after being altered, L facial droop, and HA. Per patient, his children have noticed him being "off" for the past 1 month with intermittent confusion and altered speech while talking with him on the phone. Yesterday, patient flew from [place] to visit family, and his children confirmed altered speech and facial droop which prompted patient to [place] [facility] [place]. Patient admits to severe HA 10/10 currently, has tried using extra strength tylenol without relief, and nausea this am now resolved. Patient also admits to worsening vision / blurred vision x1 month. Patient denies weakness, numbness, tingling, difficulty walking, weight loss, CP, SOB, N/V. [place] [facility] [place] 4.6x4.2 cm R frontal mass w/ additional  3.3x1.6cm adjacent lesion. Pt was transferred to [facility] for further neurosurgical evaluation. He had a CT head that showed right frontal abnormal lesion 4.6 x 4.2 cm + R lenticular nucleus 3.3 x 1.6 cm lesion.  He also had a MRI of the brain.

## 2023-12-31 NOTE — PHYSICAL THERAPY INITIAL EVALUATION ADULT - ADDITIONAL COMMENTS
Pt. reports he lives in a house with 4 CHECO and one rail and no stairs inside to negotiate. Pt. was independent PTA and owns a RW and SAC.

## 2023-12-31 NOTE — PROGRESS NOTE ADULT - ASSESSMENT
Assessment:   58M PMHx HTN, HLD, melanoma x3 (last resection 2022) originally presented to [facility] on 12/22/23 with 1 month of confusion, altered speech, L facial droop and HA. Patient was discharged and returned today for elective R craniotomy for tumor resection, now POD#1.     PLAN:  - Q4h neuro checks  - MRI shows partial resection, post op changes  - Drain removed 12/30 AM  - s/p 5-ALA, remain in dark room x 48 hours (until 12/31 @ ~7am), due to photosensitivity  - Pain control prn, avoid oversedation  - - 160  - Keppra 1000mg BID  - Decadron 4q6h, 2 week taper  - Protonix while taking steroids  - Normal diet  - Bowel regimen: Senna, Miralax  - Ancef x 3 doses, finished  - VTE prophylaxis: SCDs, lovenox  - Activity: PT, OT - pending  - Further medical management per NSICU  - Patient to be DC'd home am pending PT evaluation  - Will discuss further in AM rounds Assessment:   58M PMHx HTN, HLD, melanoma x3 (last resection 2022) originally presented to [facility] on 12/22/23 with 1 month of confusion, altered speech, L facial droop and HA. Patient was discharged and returned today for elective R craniotomy for tumor resection, now POD#1.     PLAN:  - Q4h neuro checks  - MRI shows partial resection, post op changes  - Drain removed 12/30 AM  - s/p 5-ALA, remain in dark room x 48 hours (until 12/31 @ ~7am), due to photosensitivity  - Pain control prn, avoid oversedation  - - 160  - Keppra 1000mg BID  - Decadron 4q6h, 2 week taper  - Protonix while taking steroids  - Normal diet  - Bowel regimen: Senna, Miralax  - Ancef x 3 doses, finished  - VTE prophylaxis: SCDs, lovenox  - Activity: PT, OT - pending  - Further medical management per NSICU  - Patient to be DC'd home am pending PT evaluation  - Will discuss further in AM rounds Assessment:   58M PMHx HTN, HLD, melanoma x3 (last resection 2022) originally presented to [facility] on 12/22/23 with 1 month of confusion, altered speech, L facial droop and HA. Patient was discharged and returned today for elective R craniotomy for tumor resection, now POD#2.     PLAN:  - Q4h neuro checks  - MRI shows partial resection, post op changes  - Drain removed 12/30 AM  - s/p 5-ALA, remain in dark room x 48 hours (until 12/31 @ ~7am), due to photosensitivity  - Pain control prn, avoid oversedation  - - 160  - Keppra 1000mg BID  - Decadron 4q6h, 2 week taper  - Protonix while taking steroids  - Normal diet  - Bowel regimen: Senna, Miralax  - Ancef x 3 doses, finished  - VTE prophylaxis: SCDs, lovenox  - Activity: PT, OT - pending  - Further medical management per NSICU  - Patient to be DC'd home am pending PT evaluation  - Will discuss further in AM rounds Assessment:   58M PMHx HTN, HLD, melanoma x3 (last resection 2022) originally presented to [facility] on 12/22/23 with 1 month of confusion, altered speech, L facial droop and HA. Patient was discharged and returned today for elective R craniotomy for tumor resection, now POD#2.     PLAN:  - Q4h neuro checks  - MRI shows partial resection, post op changes  - Drain removed 12/30 AM  - s/p 5-ALA, remain in dark room x 48 hours (until 12/31 @ ~7am), due to photosensitivity  - Pain control prn, avoid oversedation  - - 160  - Keppra 1000mg BID  - Decadron 4q6h, 2 week taper  - Protonix while taking steroids  - Normal diet  - Bowel regimen: Senna, Miralax  - Ancef x 3 doses, finished  - VTE prophylaxis: SCDs, lovenox  - Activity: PT, OT - pending  - Further medical management per NSICU  - Patient to be DC'd home am pending PT evaluation  - Will discuss further in AM rounds Assessment:   58M PMHx HTN, HLD, melanoma x3 (last resection 2022) originally presented to [facility] on 12/22/23 with 1 month of confusion, altered speech, L facial droop and HA. Patient was discharged and returned today for elective R craniotomy for tumor resection, now POD#2.     PLAN:  - Q4h neuro checks  - MRI shows excellent resection, post op changes  - Pain control prn, avoid oversedation  - - 160  - Keppra 1000mg BID  - Decadron 4q6h, 2 week taper  - Protonix while taking steroids  - Normal diet  - Bowel regimen: Senna, Miralax  - Ancef x 3 doses, finished  - VTE prophylaxis: SCDs, lovenox  - Activity: PT, OT - pending  - Further medical management per NSICU  - Patient to be DC'd home am pending PT evaluation Assessment:   58M PMHx HTN, HLD, melanoma x3 (last resection 2022) originally presented to [facility] on 12/22/23 with 1 month of confusion, altered speech, L facial droop and HA. Patient was discharged and returned today for elective R craniotomy for tumor resection, now POD#2.     PLAN:  - Q4h neuro checks  - MRI shows excellent resection, post op changes  - Pain control prn, avoid oversedation  - - 160  - Keppra 1000mg BID  - Decadron 4q6h, 2 week taper  - Protonix while taking steroids  - Normal diet  - Bowel regimen: Senna, Miralax  - Ancef x 3 doses, finished  - VTE prophylaxis: SCDs, lovenox  - Activity: PT, OT - pending  - Further medical management per NSICU  - Patient to be DC'd home am pending PT evaluation

## 2024-01-04 ENCOUNTER — TRANSCRIPTION ENCOUNTER (OUTPATIENT)
Age: 59
End: 2024-01-04

## 2024-01-04 DIAGNOSIS — Z85.820 PERSONAL HISTORY OF MALIGNANT MELANOMA OF SKIN: ICD-10-CM

## 2024-01-05 ENCOUNTER — APPOINTMENT (OUTPATIENT)
Dept: RADIATION ONCOLOGY | Facility: CLINIC | Age: 59
End: 2024-01-05
Payer: COMMERCIAL

## 2024-01-05 ENCOUNTER — OUTPATIENT (OUTPATIENT)
Dept: OUTPATIENT SERVICES | Facility: HOSPITAL | Age: 59
LOS: 1 days | Discharge: HOME | End: 2024-01-05
Payer: COMMERCIAL

## 2024-01-05 VITALS
SYSTOLIC BLOOD PRESSURE: 152 MMHG | OXYGEN SATURATION: 97 % | BODY MASS INDEX: 27.58 KG/M2 | HEART RATE: 103 BPM | RESPIRATION RATE: 16 BRPM | WEIGHT: 195 LBS | DIASTOLIC BLOOD PRESSURE: 98 MMHG

## 2024-01-05 DIAGNOSIS — Z63.5 DISRUPTION OF FAMILY BY SEPARATION AND DIVORCE: ICD-10-CM

## 2024-01-05 DIAGNOSIS — Z98.890 OTHER SPECIFIED POSTPROCEDURAL STATES: Chronic | ICD-10-CM

## 2024-01-05 PROCEDURE — 77470 SPECIAL RADIATION TREATMENT: CPT | Mod: 26

## 2024-01-05 PROCEDURE — 99205 OFFICE O/P NEW HI 60 MIN: CPT | Mod: 25

## 2024-01-05 SDOH — SOCIAL STABILITY - SOCIAL INSECURITY: DISRUPTION OF FAMILY BY SEPARATION AND DIVORCE: Z63.5

## 2024-01-05 NOTE — VITALS
[Maximal Pain Intensity: 0/10] : 0/10 [Least Pain Intensity: 0/10] : 0/10 [Date: ____________] : Patient's last distress assessment performed on [unfilled]. [7 - Distress Level] : Distress Level: 7 [Referred Patient  to social work for follow-up] : Patient was referred to social work for follow-up [80: Normal activity with effort; some signs or symptoms of disease.] : 80: Normal activity with effort; some signs or symptoms of disease.

## 2024-01-05 NOTE — HISTORY OF PRESENT ILLNESS
[FreeTextEntry1] : 58 year old man presents today for consultation regarding radiation therapy for brain mass.  His PMH is significant for melanoma, s/p 3 excisions (back x 2 and abdomen x 1).  He had a several month history of intermittent confusion, repetitive speech, blurry vision, worsening memory and headaches.  He flew from [place] to  to visit family.  Given his altered speech and new left facial droop, he was brought to [facility] on 12/22/23.  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   12/22/2023 CT chest, abdomen and pelvis showed no findings to suggest primary malignancy or metastatic disease in the chest, abdomen or pelvis.  There was a nonspecific 0.5 cm left lower lobe pulmonary nodule and diffuse bladder wall thickening and trabeculation.  12/22/2023 MR brain showed a heterogeneous mass in the right frontal lobe, measuring 3.8 x 3.9 x 2.6 cm.  Beyond the dominant mass were satellite foci of enhancement at the posterior aspects, and a cystic area inferior to the dominant nodule.  There is also linear plaque-like multifocal enhancement, suspicious for local leptomeningeal deposition.    He was discharged then re-admitted for surgery on 12/29/23.  He underwent a craniotomy on 12/29/23.  Pathology is pending.  12/30/2023 postoperative MRI showed partial resection of heterogeneous mass in the right basal ganglia, with residual surgical cavity containing fluid and hemorrhage.  There was continued effacement of the right lateral ventricle and 0.3 cm subfalcine herniation to the left.  He is recovering well post-operatively.  Family notes a persistent slight left facial droop.  He has mild nausea today (took steroid on empty stomach).  Explained weight loss of 30lbs over the past year, as he stopped drinking.  No headaches, vomiting, diplopia, blurry vision, ataxia, seizures, pain or fatigue.

## 2024-01-05 NOTE — LETTER CLOSING
[Consult Closing:] : Thank you for allowing me to participate in the care of this patient.  If you have any questions, please do not hesitate to contact me. [Sincerely yours,] : Sincerely yours, [FreeTextEntry3] : Ben Patel MD  of Radiation Medicine, Quality and  of Radiation Medicine [facility]/Pending sale to [facility]

## 2024-01-05 NOTE — REVIEW OF SYSTEMS
[Recent Change In Weight] : ~T recent weight change [Negative] : Allergic/Immunologic [Nausea: Grade 1 - Loss of appetite without alteration in eating habits] : Nausea: Grade 1 - Loss of appetite without alteration in eating habits [Edema Limbs: Grade 0] : Edema Limbs: Grade 0  [Fatigue: Grade 0] : Fatigue: Grade 0 [Localized Edema: Grade 0] : Localized Edema: Grade 0  [Neck Edema: Grade 0] : Neck Edema: Grade 0 [Blurred Vision: Grade 0] : Blurred Vision: Grade 0 [Facial Muscle Weakness: Grade 1] : Facial Muscle Weakness: Grade 1 - Asymptomatic; clinical or diagnostic observations only; intervention not indicated [Headache: Grade 0] : Headache: Grade 0

## 2024-01-05 NOTE — REASON FOR VISIT
[Consideration of Curative Therapy] : consideration of curative therapy for [Brain Tumor] : brain tumor [Parent] : parent [Family Member] : family member

## 2024-01-05 NOTE — DISEASE MANAGEMENT
[Clinical] : TNM Stage: c [FreeTextEntry4] : pathology pending [TTNM] : - [NTNM] : - [MTNM] : - [N/A] : Currently not applicable

## 2024-01-05 NOTE — PHYSICAL EXAM
[Sclera] : the sclera and conjunctiva were normal [PERRL With Normal Accommodation] : pupils were equal in size, round, reactive to light [Extraocular Movements] : extraocular movements were intact [Outer Ear] : the ears and nose were normal in appearance [Bowel Sounds] : normal bowel sounds [Abdomen Soft] : soft [Nondistended] : nondistended [Range of Motion to Joints] : range of motion to joints [Motor Tone] : muscle strength and tone were normal [No Spine Tenderness] : no tenderness to palpation of the vertebral spine [Normal] : no focal deficits [Sensation] : the sensory exam was normal to light touch and pinprick [Motor Exam] : the motor exam was normal [de-identified] : no nystagmus [de-identified] : staples in place, scalp c/d/i, no sigificant erythema [de-identified] : no finger-nose dysmetria

## 2024-01-10 ENCOUNTER — NON-APPOINTMENT (OUTPATIENT)
Age: 59
End: 2024-01-10

## 2024-01-10 LAB
SURGICAL PATHOLOGY STUDY: SIGNIFICANT CHANGE UP
SURGICAL PATHOLOGY STUDY: SIGNIFICANT CHANGE UP

## 2024-01-11 DIAGNOSIS — C71.1 MALIGNANT NEOPLASM OF FRONTAL LOBE: ICD-10-CM

## 2024-01-16 ENCOUNTER — NON-APPOINTMENT (OUTPATIENT)
Age: 59
End: 2024-01-16

## 2024-01-16 ENCOUNTER — RESULT REVIEW (OUTPATIENT)
Age: 59
End: 2024-01-16

## 2024-01-16 ENCOUNTER — APPOINTMENT (OUTPATIENT)
Dept: NEUROSURGERY | Facility: CLINIC | Age: 59
End: 2024-01-16
Payer: COMMERCIAL

## 2024-01-16 ENCOUNTER — OUTPATIENT (OUTPATIENT)
Dept: OUTPATIENT SERVICES | Facility: HOSPITAL | Age: 59
LOS: 1 days | Discharge: HOME | End: 2024-01-16

## 2024-01-16 ENCOUNTER — APPOINTMENT (OUTPATIENT)
Dept: HEMATOLOGY ONCOLOGY | Facility: CLINIC | Age: 59
End: 2024-01-16

## 2024-01-16 ENCOUNTER — APPOINTMENT (OUTPATIENT)
Dept: NEUROLOGY | Facility: CLINIC | Age: 59
End: 2024-01-16
Payer: COMMERCIAL

## 2024-01-16 VITALS
TEMPERATURE: 98.8 F | DIASTOLIC BLOOD PRESSURE: 87 MMHG | RESPIRATION RATE: 16 BRPM | HEART RATE: 76 BPM | WEIGHT: 195 LBS | OXYGEN SATURATION: 97 % | BODY MASS INDEX: 27.92 KG/M2 | HEIGHT: 70 IN | SYSTOLIC BLOOD PRESSURE: 130 MMHG

## 2024-01-16 DIAGNOSIS — Z98.890 OTHER SPECIFIED POSTPROCEDURAL STATES: Chronic | ICD-10-CM

## 2024-01-16 DIAGNOSIS — D49.6 NEOPLASM OF UNSPECIFIED BEHAVIOR OF BRAIN: ICD-10-CM

## 2024-01-16 LAB
BASOPHILS # BLD AUTO: 0.02 K/UL — SIGNIFICANT CHANGE UP (ref 0–0.2)
BASOPHILS NFR BLD AUTO: 0.3 % — SIGNIFICANT CHANGE UP (ref 0–2)
EOSINOPHIL # BLD AUTO: 0.16 K/UL — SIGNIFICANT CHANGE UP (ref 0–0.5)
EOSINOPHIL NFR BLD AUTO: 2.5 % — SIGNIFICANT CHANGE UP (ref 0–6)
HCT VFR BLD CALC: 45 % — SIGNIFICANT CHANGE UP (ref 39–50)
HGB BLD-MCNC: 15.5 G/DL — SIGNIFICANT CHANGE UP (ref 13–17)
IMM GRANULOCYTES NFR BLD AUTO: 1.5 % — HIGH (ref 0–0.9)
LYMPHOCYTES # BLD AUTO: 1.67 K/UL — SIGNIFICANT CHANGE UP (ref 1–3.3)
LYMPHOCYTES # BLD AUTO: 25.8 % — SIGNIFICANT CHANGE UP (ref 13–44)
MCHC RBC-ENTMCNC: 30.9 PG — SIGNIFICANT CHANGE UP (ref 27–34)
MCHC RBC-ENTMCNC: 34.4 G/DL — SIGNIFICANT CHANGE UP (ref 32–36)
MCV RBC AUTO: 89.6 FL — SIGNIFICANT CHANGE UP (ref 80–100)
MONOCYTES # BLD AUTO: 0.65 K/UL — SIGNIFICANT CHANGE UP (ref 0–0.9)
MONOCYTES NFR BLD AUTO: 10 % — SIGNIFICANT CHANGE UP (ref 2–14)
NEUTROPHILS # BLD AUTO: 3.87 K/UL — SIGNIFICANT CHANGE UP (ref 1.8–7.4)
NEUTROPHILS NFR BLD AUTO: 59.9 % — SIGNIFICANT CHANGE UP (ref 43–77)
NRBC # BLD: 0 /100 WBCS — SIGNIFICANT CHANGE UP (ref 0–0)
PLATELET # BLD AUTO: 218 K/UL — SIGNIFICANT CHANGE UP (ref 150–400)
RBC # BLD: 5.02 M/UL — SIGNIFICANT CHANGE UP (ref 4.2–5.8)
RBC # FLD: 13.4 % — SIGNIFICANT CHANGE UP (ref 10.3–14.5)
WBC # BLD: 6.47 K/UL — SIGNIFICANT CHANGE UP (ref 3.8–10.5)
WBC # FLD AUTO: 6.47 K/UL — SIGNIFICANT CHANGE UP (ref 3.8–10.5)

## 2024-01-16 PROCEDURE — 99205 OFFICE O/P NEW HI 60 MIN: CPT

## 2024-01-16 PROCEDURE — 99024 POSTOP FOLLOW-UP VISIT: CPT

## 2024-01-16 RX ORDER — TEMOZOLOMIDE 100 MG/1
100 CAPSULE ORAL
Qty: 42 | Refills: 0 | Status: ACTIVE | COMMUNITY
Start: 2024-01-16 | End: 1900-01-01

## 2024-01-16 RX ORDER — TEMOZOLOMIDE 5 MG/1
5 CAPSULE ORAL AT BEDTIME
Qty: 126 | Refills: 0 | Status: ACTIVE | COMMUNITY
Start: 2024-01-16 | End: 1900-01-01

## 2024-01-16 NOTE — ASSESSMENT
[FreeTextEntry1] : Mr. Fabiano Kovacs is a very pleasant 58-year-old male with a history of hypertension, hyperlipidemia, skin melanoma resected 3 times previously (last about 1 year ago) who presented with several months of altered mental status, repetitive speech, blurry vision, worsening memory, and headaches found to have a right frontal contrast-enhancing lesion and underwent resection on December 29, 2023. 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 He is overall doing well. His incision is healing nicely although the center has some erythema and I would like to see him next week for a wound check. He is set to see Dr. Calix today and has already met with Dr. Patel. All questions answered. He knows to call my office with any issues or concerns.

## 2024-01-16 NOTE — HISTORY OF PRESENT ILLNESS
[FreeTextEntry1] : Mr. Fabiano Kovacs is a very pleasant 58-year-old male with a history of hypertension, hyperlipidemia, skin melanoma resected 3 times previously (last about 1 year ago) who presented with several months of altered mental status, repetitive speech, blurry vision, worsening memory, and headaches found to have a right frontal contrast-enhancing lesion and underwent resection on December 29, 2023. He was discharged home on December 31, 2023. Since being home, he is overall doing well. He feels that his mental status is improved and he feels clearer. His son agrees. He still has issues with memory and has been having headaches. No fevers or issues with the incision. He is off his steroids and continues on keppra.

## 2024-01-17 RX ORDER — TEMOZOLOMIDE 20 MG/1
20 CAPSULE ORAL
Qty: 84 | Refills: 0 | Status: ACTIVE | COMMUNITY
Start: 2024-01-16 | End: 1900-01-01

## 2024-01-18 ENCOUNTER — NON-APPOINTMENT (OUTPATIENT)
Age: 59
End: 2024-01-18

## 2024-01-18 ENCOUNTER — APPOINTMENT (OUTPATIENT)
Dept: RADIATION ONCOLOGY | Facility: CLINIC | Age: 59
End: 2024-01-18
Payer: COMMERCIAL

## 2024-01-18 VITALS
DIASTOLIC BLOOD PRESSURE: 94 MMHG | HEIGHT: 70 IN | OXYGEN SATURATION: 96 % | WEIGHT: 196 LBS | HEART RATE: 73 BPM | RESPIRATION RATE: 15 BRPM | TEMPERATURE: 97 F | SYSTOLIC BLOOD PRESSURE: 135 MMHG | BODY MASS INDEX: 28.06 KG/M2

## 2024-01-18 PROCEDURE — 77263 THER RADIOLOGY TX PLNG CPLX: CPT

## 2024-01-18 PROCEDURE — 99214 OFFICE O/P EST MOD 30 MIN: CPT | Mod: 25

## 2024-01-18 PROCEDURE — 77334 RADIATION TREATMENT AID(S): CPT | Mod: 26

## 2024-01-18 NOTE — PHYSICAL EXAM
[FreeTextEntry1] :  Exam as below (with documented exceptions in parentheses):  General:  Healthy appearing man well groomed and without deformities. KPS is 70  Mental Status:  Awake, alert and attentive. Oriented to person, place and time. Recent and remote memory intact. Normal concentration. Fluent spontaneous speech with intact naming and repetition. Normal fund of knowledge.    Cranial Nerves: II: Full visual fields. III,IV,VI:Pupils round, reactive to light. Full extraocular movements. No nystagmus. V: Normal bilateral bite, facial sensation. VII: No facial weakness. VIII: Hearing intact. IX,X: Palate midline, intact gag. XI:  Sternocleidomastoids normal. XII: Tongue protrudes midline. No dysarthria.  (flat NLF on left)  Motor:  Normal tone, bulk and power throughout including arms and legs, proximal and distal. No pronator drift. No abnormal movements.   Sensation: Normal in arms, legs and trunk to pin, proprioception and vibration. Negative Romberg.   Coordination: No dysmetria or dysdiadochokinesis bilaterally. Normal heel-shin testing.   Gait: Normal including heel and toe walking. Normal station.   Reflexes: Normoactive and symmetric throughout. Absent Babinski bilaterally.   Vascular: No peripheral edema/calf tenderness.   Eyes: Funduscopic exam without papilledema (deferred)  Heart: Regular rate and rhythm. Normal s1-s2. No murmurs, rubs or gallops.   Respiratory: Lungs clear to auscultation bilaterally.   Abdomen: Nontender, non-distended. No hepatosplenomegaly. Normal bowel sounds in four quadrants.

## 2024-01-18 NOTE — DISCUSSION/SUMMARY
[FreeTextEntry1] : Newly diagnosed glioblastoma.  We discussed standard management of these tumors including radiation and oral temozolomide chemotherapy with associated risks.  We discussed possible study if eligible (otilioagon, if unmethylated).  We discussed treatment goals (palliative), and ideal location for therapy given where he is residing (wishes to come to [place]).    Timothyis pending Resume dexamethasone 4 daily chemo panel today Coordinated eval by  team Will prescribe TMZ 75/m2 to begin with RT continue keppra will taper in future Discussed return to work, on hold indefinitely RTC 2w via TEB, then f/u at [facility]

## 2024-01-18 NOTE — REVIEW OF SYSTEMS
[Negative] : Allergic/Immunologic [Nausea: Grade 0] : Nausea: Grade 0 [Vomiting: Grade 0] : Vomiting: Grade 0 [Edema Limbs: Grade 0] : Edema Limbs: Grade 0  [Fatigue: Grade 0] : Fatigue: Grade 0 [Localized Edema: Grade 0] : Localized Edema: Grade 0  [Neck Edema: Grade 0] : Neck Edema: Grade 0 [Blurred Vision: Grade 1 - Intervention not indicated] : Blurred Vision: Grade 1 - Intervention not indicated [Headache: Grade 0] : Headache: Grade 0

## 2024-01-18 NOTE — HISTORY OF PRESENT ILLNESS
[FreeTextEntry1] : 58 year old man returns today for further discussion and radiation planning  for Glioblastoma  Recall, his PMH is significant for melanoma, s/p 3 excisions (back x 2 and abdomen x 1).  He had a several month history of intermittent confusion, repetitive speech, blurry vision, worsening memory and headaches.  He flew from [place] to  to visit family.  Given his altered speech and new left facial droop, he was brought to [facility] on 12/22/23.  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   12/22/2023 CT chest, abdomen and pelvis showed no findings to suggest primary malignancy or metastatic disease in the chest, abdomen or pelvis.  There was a nonspecific 0.5 cm left lower lobe pulmonary nodule and diffuse bladder wall thickening and trabeculation.  12/22/2023 MR brain showed a heterogeneous mass in the right frontal lobe, measuring 3.8 x 3.9 x 2.6 cm.  Beyond the dominant mass were satellite foci of enhancement at the posterior aspects, and a cystic area inferior to the dominant nodule.  There is also linear plaque-like multifocal enhancement, suspicious for local leptomeningeal deposition.    He was discharged then re-admitted for surgery on 12/29/23.  He underwent a craniotomy on 12/29/23. Pathology showed high-grade astrocytoma (WHO grade 4).  12/30/2023 postoperative MRI showed partial resection of heterogeneous mass in the right basal ganglia, with residual surgical cavity containing fluid and hemorrhage.  There was continued effacement of the right lateral ventricle and 0.3 cm subfalcine herniation to the left.  Interval history: Pathology from craniotomy returned. He met with Dr. Tracie Deleon and Dr. Anatoly Calix on 1/16/24.  Most of staples were removed on that day.  Reports ongoing blurry vision.  No headaches, nausea, vomiting, diplopia, facial droop, ataxia, seizures, pain, fatigue or weight loss

## 2024-01-18 NOTE — PHYSICAL EXAM
[Sclera] : the sclera and conjunctiva were normal [Extraocular Movements] : extraocular movements were intact [Outer Ear] : the ears and nose were normal in appearance [Normal] : no focal deficits

## 2024-01-18 NOTE — HISTORY OF PRESENT ILLNESS
[FreeTextEntry1] : NEURO-ONCOLOGY  This 58 year old right handed man is seen at the request of Dr. Deleon for my advice and opinion regarding glioblastoma  He presented in 12/23 with headaches and memory loss, with confusion.  He developed left facial weakness.  He was found to have a right frontal and insular cystic and enhancing lesion. CTC AP was unrevealing.  Tumor was subtotally resected by Dr. Deleon on 12/29/23, revealing glioblastoma, IDH wt. Following surgery he's improved, albeit with residual memory deficit and fatigue, as well as headaches.  He tapered steroids off.  He met with Dr. Amanda fowler.   Notably, he lives in [place] but as his family is here, he will remain here for treatment.   PMH:  He had a number of cutaneous melanomas removed, but without systemic therapy or invasion, last of these was 12 mo ago.  PSH: as above. orthopedic surgery on shoulder. 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 Here with son Fabiano (a podiatist) and ex wife aTmmi.  Nonsmoker. nondrinker FHX:  no brain tumors

## 2024-01-18 NOTE — DATA REVIEWED
[de-identified] : Reviewed pre and post operative scans from 12/23 showing subtotal resection of cystic enhancing right frontal and insular lesion, there are satellite foci of enhancement that remain

## 2024-01-22 LAB
ALBUMIN SERPL ELPH-MCNC: 3.9 G/DL
ALP BLD-CCNC: 117 U/L
ALT SERPL-CCNC: 53 U/L
ANION GAP SERPL CALC-SCNC: 13 MMOL/L
AST SERPL-CCNC: 19 U/L
BILIRUB SERPL-MCNC: 0.4 MG/DL
BUN SERPL-MCNC: 10 MG/DL
CALCIUM SERPL-MCNC: 9 MG/DL
CHLORIDE SERPL-SCNC: 104 MMOL/L
CO2 SERPL-SCNC: 26 MMOL/L
CREAT SERPL-MCNC: 0.82 MG/DL
EGFR: 101 ML/MIN/1.73M2
GLUCOSE SERPL-MCNC: 121 MG/DL
HBV CORE IGG+IGM SER QL: NONREACTIVE
HBV SURFACE AB SER QL: NONREACTIVE
HBV SURFACE AG SER QL: NONREACTIVE
POTASSIUM SERPL-SCNC: 5.1 MMOL/L
PROT SERPL-MCNC: 6.1 G/DL
SODIUM SERPL-SCNC: 143 MMOL/L

## 2024-01-23 ENCOUNTER — APPOINTMENT (OUTPATIENT)
Dept: NEUROSURGERY | Facility: CLINIC | Age: 59
End: 2024-01-23
Payer: COMMERCIAL

## 2024-01-23 PROCEDURE — 99024 POSTOP FOLLOW-UP VISIT: CPT

## 2024-01-23 NOTE — HISTORY OF PRESENT ILLNESS
[FreeTextEntry1] : Mr. Fabiano Kovacs is a very pleasant 58-year-old male with a history of hypertension, hyperlipidemia, skin melanoma resected 3 times previously (last about 1 year ago) who presented with several months of altered mental status, repetitive speech, blurry vision, worsening memory, and headaches found to have a right frontal contrast-enhancing lesion and underwent resection on December 29, 2023.  He was seen last week and was doing well.  I removed half of his staples at that visit and kept the remaining staples due to a little bit of erythema around the incision.  I spoke with his niece this past weekend who reported that the erythema was persistent so I started him on keflex.

## 2024-01-23 NOTE — PHYSICAL EXAM
[FreeTextEntry1] : AOx3 PERRL EOMI slight left facial droop (baseline) Moving all extremities well Incision well healed, scabbing, minimal/improved erythema, remaining staples removed

## 2024-01-23 NOTE — ASSESSMENT
[FreeTextEntry1] : Mr. Fabiano Kovacs is a very pleasant 58-year-old male with a history of hypertension, hyperlipidemia, skin melanoma resected 3 times previously (last about 1 year ago) who presented with several months of altered mental status, repetitive speech, blurry vision, worsening memory, and headaches found to have a right frontal contrast-enhancing lesion and underwent resection on December 29, 2023. 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 He is overall doing well and the erythema around his incision has resolved.  I would like him to complete his Keflex course.  I removed the remaining staples.  We reviewed the do's and don'ts of activity.  I encouraged walking, which she is doing every day.  He and his daughter know to call my office with any issues or concerns with the incision.  I told him to monitor it closely.  I will see him back in 1 month.  All questions answered.

## 2024-01-26 PROCEDURE — 77300 RADIATION THERAPY DOSE PLAN: CPT | Mod: 26

## 2024-01-26 PROCEDURE — 77301 RADIOTHERAPY DOSE PLAN IMRT: CPT | Mod: 26

## 2024-01-26 PROCEDURE — 77338 DESIGN MLC DEVICE FOR IMRT: CPT | Mod: 26

## 2024-01-30 ENCOUNTER — APPOINTMENT (OUTPATIENT)
Dept: NEUROLOGY | Facility: CLINIC | Age: 59
End: 2024-01-30
Payer: COMMERCIAL

## 2024-01-30 PROCEDURE — 99215 OFFICE O/P EST HI 40 MIN: CPT | Mod: 95

## 2024-01-30 NOTE — PHYSICAL EXAM
[FreeTextEntry1] : Exam Limited by TEB  Well appearing, seated in chair KPS is 80 Alert and oriented x3, able to provide history, not aphasic, attentive Face symmetric, prior flat NLF improved EOMI Motor without drift or apparent weakness Able to stand and walk with normal station

## 2024-01-30 NOTE — DATA REVIEWED
[de-identified] : Reviewed pre and post operative scans from 12/23 showing subtotal resection of cystic enhancing right frontal and insular lesion, there are satellite foci of enhancement that remain

## 2024-01-30 NOTE — REASON FOR VISIT
[Home] : at home, [unfilled] , at the time of the visit. [Medical Office: ([facility])___] : at the medical office located in  [Family Member] : family member [Patient] : the patient [Self] : self

## 2024-01-30 NOTE — DISCUSSION/SUMMARY
[FreeTextEntry1] : Newly diagnosed glioblastoma.  Plan for standard chemoRT.  Caris pending Plan to start TMZ 75/m2 night prior RT, chemo education reviewed dexamethasone 4 daily Reviewed importance of weekly CBC continue keppra will taper in future Discussed return to work, on hold indefinitely RTC 2w at [facility]

## 2024-01-30 NOTE — HISTORY OF PRESENT ILLNESS
[FreeTextEntry1] : NEURO-ONCOLOGY  This 58 year old right handed man is seen at the request of Dr. Deleon for my advice and opinion regarding glioblastoma  He presented in 12/23 with headaches and memory loss, with confusion.  He developed left facial weakness.  He was found to have a right frontal and insular cystic and enhancing lesion. CTC AP was unrevealing.  Tumor was subtotally resected by Dr. Deleon on 12/29/23, revealing glioblastoma, IDH wt, MGMT methylated. Following surgery he's improved, albeit with residual memory deficit and fatigue, as well as headaches.  He tapered steroids off.  He met with Dr. Amanda fowler.   Notably, he lives in [place] but as his family is here, he will remain here for treatment.   INTERVAL HISTORY:  No new complaints.   awaiting start of RT.  HBV neg.   PMH:  He had a number of cutaneous melanomas removed, but without systemic therapy or invasion, last of these was 12 mo ago.  PSH: as above. orthopedic surgery on shoulder. 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 Here with son Fabiano (a podiatist) and ex wife Tammi.  Nonsmoker. nondrinker FHX:  no brain tumors

## 2024-02-02 PROCEDURE — 77280 THER RAD SIMULAJ FIELD SMPL: CPT | Mod: 26

## 2024-02-05 PROCEDURE — G6002: CPT | Mod: 26

## 2024-02-06 PROCEDURE — G6002: CPT | Mod: 26

## 2024-02-07 PROCEDURE — G6002: CPT | Mod: 26

## 2024-02-08 ENCOUNTER — RESULT REVIEW (OUTPATIENT)
Age: 59
End: 2024-02-08

## 2024-02-08 ENCOUNTER — APPOINTMENT (OUTPATIENT)
Dept: HEMATOLOGY ONCOLOGY | Facility: CLINIC | Age: 59
End: 2024-02-08

## 2024-02-08 ENCOUNTER — NON-APPOINTMENT (OUTPATIENT)
Age: 59
End: 2024-02-08

## 2024-02-08 ENCOUNTER — OUTPATIENT (OUTPATIENT)
Dept: OUTPATIENT SERVICES | Facility: HOSPITAL | Age: 59
LOS: 1 days | Discharge: HOME | End: 2024-02-08

## 2024-02-08 VITALS
WEIGHT: 197 LBS | TEMPERATURE: 97 F | HEIGHT: 70 IN | BODY MASS INDEX: 28.2 KG/M2 | RESPIRATION RATE: 16 BRPM | DIASTOLIC BLOOD PRESSURE: 84 MMHG | HEART RATE: 88 BPM | SYSTOLIC BLOOD PRESSURE: 135 MMHG | OXYGEN SATURATION: 96 %

## 2024-02-08 DIAGNOSIS — Z98.890 OTHER SPECIFIED POSTPROCEDURAL STATES: Chronic | ICD-10-CM

## 2024-02-08 DIAGNOSIS — D49.6 NEOPLASM OF UNSPECIFIED BEHAVIOR OF BRAIN: ICD-10-CM

## 2024-02-08 LAB
BASOPHILS # BLD AUTO: 0.2 K/UL — SIGNIFICANT CHANGE UP (ref 0–0.2)
BASOPHILS NFR BLD AUTO: 1.2 % — SIGNIFICANT CHANGE UP (ref 0–2)
EOSINOPHIL # BLD AUTO: 0 K/UL — SIGNIFICANT CHANGE UP (ref 0–0.5)
EOSINOPHIL NFR BLD AUTO: 0.3 % — SIGNIFICANT CHANGE UP (ref 0–6)
HCT VFR BLD CALC: 49.7 % — SIGNIFICANT CHANGE UP (ref 39–50)
HGB BLD-MCNC: 16.8 G/DL — SIGNIFICANT CHANGE UP (ref 13–17)
LYMPHOCYTES # BLD AUTO: 1.6 K/UL — SIGNIFICANT CHANGE UP (ref 1–3.3)
LYMPHOCYTES # BLD AUTO: 13.2 % — SIGNIFICANT CHANGE UP (ref 13–44)
MCHC RBC-ENTMCNC: 31.5 PG — SIGNIFICANT CHANGE UP (ref 27–34)
MCHC RBC-ENTMCNC: 33.8 G/DL — SIGNIFICANT CHANGE UP (ref 32–36)
MCV RBC AUTO: 93.3 FL — SIGNIFICANT CHANGE UP (ref 80–100)
MONOCYTES # BLD AUTO: 0.8 K/UL — SIGNIFICANT CHANGE UP (ref 0–0.9)
MONOCYTES NFR BLD AUTO: 6.6 % — SIGNIFICANT CHANGE UP (ref 2–14)
NEUTROPHILS # BLD AUTO: 9.8 K/UL — HIGH (ref 1.8–7.4)
NEUTROPHILS NFR BLD AUTO: 78.7 % — HIGH (ref 43–77)
PLATELET # BLD AUTO: 338 K/UL — SIGNIFICANT CHANGE UP (ref 150–400)
RBC # BLD: 5.33 M/UL — SIGNIFICANT CHANGE UP (ref 4.2–5.8)
RBC # FLD: 11.7 % — SIGNIFICANT CHANGE UP (ref 10.3–14.5)
WBC # BLD: 12.5 K/UL — HIGH (ref 3.8–10.5)
WBC # FLD AUTO: 12.5 K/UL — HIGH (ref 3.8–10.5)

## 2024-02-08 PROCEDURE — 77014: CPT | Mod: 26

## 2024-02-08 NOTE — DISEASE MANAGEMENT
[Clinical] : TNM Stage: c [N/A] : Currently not applicable [FreeTextEntry4] : high-grade glioma (astrocytoma, WHO IV, IDH wt) [TTNM] : - [NTNM] : - [MTNM] : - [de-identified] : 798 [de-identified] : 8102 [de-identified] : partial brain

## 2024-02-08 NOTE — REASON FOR VISIT
[Routine On-Treatment] : a routine on-treatment visit for [Brain Tumor] : brain tumor [Family Member] : family member

## 2024-02-08 NOTE — VITALS
[Maximal Pain Intensity: 3/10] : 3/10 [Least Pain Intensity: 0/10] : 0/10 [Pain Description/Quality: ___] : Pain description/quality: [unfilled] [Pain Duration: ___] : Pain duration: [unfilled] [Pain Location: ___] : Pain Location: [unfilled] [OTC] : OTC [80: Normal activity with effort; some signs or symptoms of disease.] : 80: Normal activity with effort; some signs or symptoms of disease.  [Pain Interferes with ADLs] : Pain does not interfere with activities of daily living

## 2024-02-08 NOTE — REVIEW OF SYSTEMS
[Nausea: Grade 1 - Loss of appetite without alteration in eating habits] : Nausea: Grade 1 - Loss of appetite without alteration in eating habits [Edema Limbs: Grade 0] : Edema Limbs: Grade 0  [Fatigue: Grade 1 - Fatigue relieved by rest] : Fatigue: Grade 1 - Fatigue relieved by rest [Localized Edema: Grade 0] : Localized Edema: Grade 0  [Neck Edema: Grade 0] : Neck Edema: Grade 0 [Blurred Vision: Grade 1 - Intervention not indicated] : Blurred Vision: Grade 1 - Intervention not indicated [Mucositis Oral: Grade 1 - Asymptomatic or mild symptoms; intervention not indicated] : Mucositis Oral: Grade 1 - Asymptomatic or mild symptoms; intervention not indicated [Dysgeusia: Grade 0] : Dysgeusia: Grade 0 [Headache: Grade 1 - Mild pain] : Headache: Grade 1 - Mild pain [Alopecia: Grade 0] : Alopecia: Grade 0 [Skin Hyperpigmentation: Grade 0] : Skin Hyperpigmentation: Grade 0

## 2024-02-08 NOTE — PHYSICAL EXAM
[Skin Color & Pigmentation] : normal skin color and pigmentation [Normal] : oriented to person, place and time, the affect was normal, the mood was normal and not anxious

## 2024-02-09 PROCEDURE — 77427 RADIATION TX MANAGEMENT X5: CPT

## 2024-02-09 PROCEDURE — G6002: CPT | Mod: 26

## 2024-02-11 ENCOUNTER — INPATIENT (INPATIENT)
Facility: HOSPITAL | Age: 59
LOS: 3 days | Discharge: HOME | DRG: 25 | End: 2024-02-15
Attending: STUDENT IN AN ORGANIZED HEALTH CARE EDUCATION/TRAINING PROGRAM | Admitting: STUDENT IN AN ORGANIZED HEALTH CARE EDUCATION/TRAINING PROGRAM
Payer: COMMERCIAL

## 2024-02-11 VITALS
TEMPERATURE: 98 F | HEIGHT: 70 IN | DIASTOLIC BLOOD PRESSURE: 85 MMHG | SYSTOLIC BLOOD PRESSURE: 134 MMHG | OXYGEN SATURATION: 94 % | WEIGHT: 190.04 LBS | RESPIRATION RATE: 20 BRPM | HEART RATE: 69 BPM

## 2024-02-11 DIAGNOSIS — G93.5 COMPRESSION OF BRAIN: ICD-10-CM

## 2024-02-11 DIAGNOSIS — Z98.890 OTHER SPECIFIED POSTPROCEDURAL STATES: Chronic | ICD-10-CM

## 2024-02-11 LAB
ANION GAP SERPL CALC-SCNC: 11 MMOL/L — SIGNIFICANT CHANGE UP (ref 5–17)
APTT BLD: 26.3 SEC — SIGNIFICANT CHANGE UP (ref 24.5–35.6)
BLD GP AB SCN SERPL QL: SIGNIFICANT CHANGE UP
BUN SERPL-MCNC: 6.5 MG/DL — LOW (ref 8–20)
CALCIUM SERPL-MCNC: 8.9 MG/DL — SIGNIFICANT CHANGE UP (ref 8.4–10.5)
CHLORIDE SERPL-SCNC: 97 MMOL/L — SIGNIFICANT CHANGE UP (ref 96–108)
CO2 SERPL-SCNC: 25 MMOL/L — SIGNIFICANT CHANGE UP (ref 22–29)
CREAT SERPL-MCNC: 0.68 MG/DL — SIGNIFICANT CHANGE UP (ref 0.5–1.3)
EGFR: 107 ML/MIN/1.73M2 — SIGNIFICANT CHANGE UP
GLUCOSE BLDC GLUCOMTR-MCNC: 163 MG/DL — HIGH (ref 70–99)
GLUCOSE SERPL-MCNC: 130 MG/DL — HIGH (ref 70–99)
HCT VFR BLD CALC: 45.7 % — SIGNIFICANT CHANGE UP (ref 39–50)
HGB BLD-MCNC: 15.5 G/DL — SIGNIFICANT CHANGE UP (ref 13–17)
INR BLD: 0.95 RATIO — SIGNIFICANT CHANGE UP (ref 0.85–1.18)
MAGNESIUM SERPL-MCNC: 1.9 MG/DL — SIGNIFICANT CHANGE UP (ref 1.6–2.6)
MCHC RBC-ENTMCNC: 29.9 PG — SIGNIFICANT CHANGE UP (ref 27–34)
MCHC RBC-ENTMCNC: 33.9 GM/DL — SIGNIFICANT CHANGE UP (ref 32–36)
MCV RBC AUTO: 88.2 FL — SIGNIFICANT CHANGE UP (ref 80–100)
PHOSPHATE SERPL-MCNC: 3.7 MG/DL — SIGNIFICANT CHANGE UP (ref 2.4–4.7)
PLATELET # BLD AUTO: 267 K/UL — SIGNIFICANT CHANGE UP (ref 150–400)
POTASSIUM SERPL-MCNC: 4.5 MMOL/L — SIGNIFICANT CHANGE UP (ref 3.5–5.3)
POTASSIUM SERPL-SCNC: 4.5 MMOL/L — SIGNIFICANT CHANGE UP (ref 3.5–5.3)
PROTHROM AB SERPL-ACNC: 10.6 SEC — SIGNIFICANT CHANGE UP (ref 9.5–13)
RBC # BLD: 5.18 M/UL — SIGNIFICANT CHANGE UP (ref 4.2–5.8)
RBC # FLD: 12.6 % — SIGNIFICANT CHANGE UP (ref 10.3–14.5)
SODIUM SERPL-SCNC: 133 MMOL/L — LOW (ref 135–145)
WBC # BLD: 10.05 K/UL — SIGNIFICANT CHANGE UP (ref 3.8–10.5)
WBC # FLD AUTO: 10.05 K/UL — SIGNIFICANT CHANGE UP (ref 3.8–10.5)

## 2024-02-11 PROCEDURE — 99221 1ST HOSP IP/OBS SF/LOW 40: CPT

## 2024-02-11 PROCEDURE — 99285 EMERGENCY DEPT VISIT HI MDM: CPT

## 2024-02-11 PROCEDURE — 99291 CRITICAL CARE FIRST HOUR: CPT | Mod: 24

## 2024-02-11 RX ORDER — LEVETIRACETAM 250 MG/1
500 TABLET, FILM COATED ORAL EVERY 12 HOURS
Refills: 0 | Status: DISCONTINUED | OUTPATIENT
Start: 2024-02-11 | End: 2024-02-11

## 2024-02-11 RX ORDER — SODIUM CHLORIDE 9 MG/ML
1000 INJECTION, SOLUTION INTRAVENOUS
Refills: 0 | Status: DISCONTINUED | OUTPATIENT
Start: 2024-02-11 | End: 2024-02-15

## 2024-02-11 RX ORDER — SODIUM CHLORIDE 9 MG/ML
1000 INJECTION INTRAMUSCULAR; INTRAVENOUS; SUBCUTANEOUS
Refills: 0 | Status: DISCONTINUED | OUTPATIENT
Start: 2024-02-11 | End: 2024-02-12

## 2024-02-11 RX ORDER — DEXTROSE 50 % IN WATER 50 %
12.5 SYRINGE (ML) INTRAVENOUS ONCE
Refills: 0 | Status: DISCONTINUED | OUTPATIENT
Start: 2024-02-11 | End: 2024-02-15

## 2024-02-11 RX ORDER — METOCLOPRAMIDE HCL 10 MG
10 TABLET ORAL ONCE
Refills: 0 | Status: COMPLETED | OUTPATIENT
Start: 2024-02-11 | End: 2024-02-11

## 2024-02-11 RX ORDER — GLUCAGON INJECTION, SOLUTION 0.5 MG/.1ML
1 INJECTION, SOLUTION SUBCUTANEOUS ONCE
Refills: 0 | Status: DISCONTINUED | OUTPATIENT
Start: 2024-02-11 | End: 2024-02-15

## 2024-02-11 RX ORDER — LABETALOL HCL 100 MG
10 TABLET ORAL
Refills: 0 | Status: DISCONTINUED | OUTPATIENT
Start: 2024-02-11 | End: 2024-02-15

## 2024-02-11 RX ORDER — TRAMADOL HYDROCHLORIDE 50 MG/1
25 TABLET ORAL ONCE
Refills: 0 | Status: DISCONTINUED | OUTPATIENT
Start: 2024-02-11 | End: 2024-02-11

## 2024-02-11 RX ORDER — DEXTROSE 50 % IN WATER 50 %
15 SYRINGE (ML) INTRAVENOUS ONCE
Refills: 0 | Status: DISCONTINUED | OUTPATIENT
Start: 2024-02-11 | End: 2024-02-15

## 2024-02-11 RX ORDER — DEXAMETHASONE 0.5 MG/5ML
4 ELIXIR ORAL EVERY 6 HOURS
Refills: 0 | Status: ACTIVE | OUTPATIENT
Start: 2024-02-11 | End: 2025-01-09

## 2024-02-11 RX ORDER — HYDRALAZINE HCL 50 MG
10 TABLET ORAL
Refills: 0 | Status: DISCONTINUED | OUTPATIENT
Start: 2024-02-11 | End: 2024-02-15

## 2024-02-11 RX ORDER — DEXTROSE 50 % IN WATER 50 %
25 SYRINGE (ML) INTRAVENOUS ONCE
Refills: 0 | Status: DISCONTINUED | OUTPATIENT
Start: 2024-02-11 | End: 2024-02-15

## 2024-02-11 RX ORDER — PANTOPRAZOLE SODIUM 20 MG/1
40 TABLET, DELAYED RELEASE ORAL DAILY
Refills: 0 | Status: ACTIVE | OUTPATIENT
Start: 2024-02-11 | End: 2025-01-09

## 2024-02-11 RX ORDER — DEXAMETHASONE 0.5 MG/5ML
10 ELIXIR ORAL ONCE
Refills: 0 | Status: COMPLETED | OUTPATIENT
Start: 2024-02-11 | End: 2024-02-12

## 2024-02-11 RX ORDER — MAGNESIUM SULFATE 500 MG/ML
2 VIAL (ML) INJECTION ONCE
Refills: 0 | Status: COMPLETED | OUTPATIENT
Start: 2024-02-11 | End: 2024-02-11

## 2024-02-11 RX ORDER — ACETAMINOPHEN 500 MG
650 TABLET ORAL EVERY 6 HOURS
Refills: 0 | Status: DISCONTINUED | OUTPATIENT
Start: 2024-02-11 | End: 2024-02-12

## 2024-02-11 RX ORDER — ACETAMINOPHEN 500 MG
1000 TABLET ORAL EVERY 6 HOURS
Refills: 0 | Status: COMPLETED | OUTPATIENT
Start: 2024-02-11 | End: 2024-02-12

## 2024-02-11 RX ORDER — INSULIN LISPRO 100/ML
VIAL (ML) SUBCUTANEOUS
Refills: 0 | Status: DISCONTINUED | OUTPATIENT
Start: 2024-02-11 | End: 2024-02-14

## 2024-02-11 RX ORDER — ONDANSETRON 8 MG/1
4 TABLET, FILM COATED ORAL EVERY 6 HOURS
Refills: 0 | Status: DISCONTINUED | OUTPATIENT
Start: 2024-02-11 | End: 2024-02-15

## 2024-02-11 RX ORDER — SENNA PLUS 8.6 MG/1
2 TABLET ORAL AT BEDTIME
Refills: 0 | Status: DISCONTINUED | OUTPATIENT
Start: 2024-02-11 | End: 2024-02-15

## 2024-02-11 RX ORDER — POLYETHYLENE GLYCOL 3350 17 G/17G
17 POWDER, FOR SOLUTION ORAL DAILY
Refills: 0 | Status: DISCONTINUED | OUTPATIENT
Start: 2024-02-11 | End: 2024-02-15

## 2024-02-11 RX ORDER — LEVETIRACETAM 250 MG/1
500 TABLET, FILM COATED ORAL EVERY 12 HOURS
Refills: 0 | Status: DISCONTINUED | OUTPATIENT
Start: 2024-02-11 | End: 2024-02-13

## 2024-02-11 RX ADMIN — LEVETIRACETAM 500 MILLIGRAM(S): 250 TABLET, FILM COATED ORAL at 19:55

## 2024-02-11 RX ADMIN — Medication 10 MILLIGRAM(S): at 20:24

## 2024-02-11 RX ADMIN — Medication 650 MILLIGRAM(S): at 19:50

## 2024-02-11 RX ADMIN — Medication 25 GRAM(S): at 20:24

## 2024-02-11 RX ADMIN — Medication 650 MILLIGRAM(S): at 20:50

## 2024-02-11 RX ADMIN — SODIUM CHLORIDE 75 MILLILITER(S): 9 INJECTION INTRAMUSCULAR; INTRAVENOUS; SUBCUTANEOUS at 20:10

## 2024-02-11 RX ADMIN — Medication 2: at 22:49

## 2024-02-11 RX ADMIN — Medication 4 MILLIGRAM(S): at 19:50

## 2024-02-11 RX ADMIN — TRAMADOL HYDROCHLORIDE 25 MILLIGRAM(S): 50 TABLET ORAL at 22:09

## 2024-02-11 RX ADMIN — TRAMADOL HYDROCHLORIDE 25 MILLIGRAM(S): 50 TABLET ORAL at 23:00

## 2024-02-11 RX ADMIN — ONDANSETRON 4 MILLIGRAM(S): 8 TABLET, FILM COATED ORAL at 22:26

## 2024-02-11 NOTE — ED PROVIDER NOTE - OBJECTIVE STATEMENT
58-year-old male with a past medical history of hypertension, hyperlipidemia, melanoma, glioblastoma s/p R craniectomy for resection transferred from [place] for progressing glioblastoma with midline shift. Woke up with severe headaches yday, this morning w/similar headache and came to the hospital.

## 2024-02-11 NOTE — H&P ADULT - NSHPLABSRESULTS_GEN_ALL_CORE
LABS:  Labs reviewed from [facility]        RADIOLOGY & ADDITIONAL STUDIES:    ACC: [id number] EXAM: CT BRAIN   PROCEDURE DATE: 02/11/2024    IMPRESSION: Progression of the known right GBM measuring 10 x 5.6 x 8.2 cm with solid and cystic components. There is effacement of the right lateral ventricle with 1 cm shift to the left.        MR Head w/wo IV Cont (12.30.23 @ 16:05)  IMPRESSION:  Partial resection of heterogeneous mass in RIGHT basal   ganglia with residual surgical cavity containing fluid and hemorrhage.   Continued effacement of RIGHT lateral ventricle and 3 mm subfalcine   herniation to the LEFT.

## 2024-02-11 NOTE — H&P ADULT - NSHPPHYSICALEXAM_GEN_ALL_CORE
GENERAL: NAD, well-groomed  HEAD:  Atraumatic, normocephalic   WOUND: Well healed  EYES: Conjunctiva and sclera clear  ENMT: Good dentition  NECK: Supple, nontender to palpation  CECILY COMA SCORE: E- V- M- = 15       E: 4= opens eyes spontaneously 3= to voice 2= to noxious 1= no opening       V: 5= oriented 4= confused 3= inappropriate words 2= incomprehensible sounds 1= nonverbal 1T= intubated       M: 6= follows commands 5= localizes 4= withdraws 3= flexor posturing 2= extensor posturing 1= no movement  MENTAL STATUS: AAO x3; Awake; Opens eyes spontaneously; Appropriately conversant without aphasia; following simple commands  CRANIAL NERVES: PERRL. EOMI without nystagmus. Facial sensation intact V1-3 distribution b/l. Mild L facial, tongue midline. Hearing grossly intact. Speech clear. Head turning and shoulder shrug intact.   MOTOR: strength 5/5 b/l upper and lower extremities  SENSATION: grossly intact to light touch all extremities  COORDINATION: Gait testing deferred; no upper extremity dysmetria  CHEST/LUNG: Nonlabored on room air, no accessory muscle use  SKIN: Warm, dry

## 2024-02-11 NOTE — H&P ADULT - CRITICAL CARE ATTENDING COMMENT
I have personally provided the above mentioned minutes of critical care time including review of laboratory values, imaging, interdisciplinary care coordination, and frequent monitoring for decompensation.    Based on my personal evaluation, this patient has a high probability of imminent or life-threatening deterioration due to the presence of: brain tumor with surrounding cerebral edema and mass effect, severe headaches, emesis -  which required my direct attention, intervention, and personal management. Other billable procedures, if performed, are documented separately.

## 2024-02-11 NOTE — PATIENT PROFILE ADULT - .
HEARING AID     Smita Pro mini RITE x2  Size 3 (85) wires, pro wax miniFIT guards, 8mm double bass domes, 312 battery  *LEFT Aid under Repair ONLY Warranty until 5/15/2021    No Warranty    [facility] 11/2020    REASON FOR VISIT:  Dropped off aids not working    SERVICES PROVIDED:  Cleaned up both aids. The microphones and filters needed to be changed. Called and spoke to wife letting her know it is ready for . Out of warranty so $30 charge.      FOLLOW-UP:  As needed       11-Feb-2024 20:35:54

## 2024-02-11 NOTE — CONSULT NOTE ADULT - ASSESSMENT
ASSESSMENT:  58M with PMHx of HTN, HLD, melanoma x3 last resection 1 year ago, well-known to our service for known GBM s/p R crani for GBM resection in 12/29/2023 currently on TMZ and just started XRT on Monday presents as transfer  from [facility] for worsening HA. Pt reports he was doing well until he started having severe HAs 2 days ago, now w/ nausea and vomiting that started today. Has had intermittent blurred vision for the last 2 months that was present pre-op. Mild L facial also present pre-op still present. Denies any dizziness. CTH at [facility] showed 10 x 5.6 x 8.2 cm with solid and cystic components w/ effacement of R lateral ventricle with 1 cm shift to L.     PLAN:    -Q1h neuro checks  -Admit to neuroICU  -STAT CTH for any changes in neuro exam  -MRI brain w/wo w/ brainlab protocol; needs fiducials placed prior to OT  -Pain control prn, avoid oversedation  --160  -Continue Keppra 500mg BID  -Decadron 4q6h; ISS and PPI while on steroid   -Sodium goal >140  -Recommend heme/onc and rad/onc consults    -Dysphagia screen and then advance diet as tolerated  -Bowel regimen: senna, miralax  -VTE prophylaxis: SCDs, lovenox  -Further medical management per NSICU  -Discussed case with Dr. Ventura

## 2024-02-11 NOTE — PATIENT PROFILE ADULT - FALL HARM RISK - HARM RISK INTERVENTIONS
Assistance with ambulation/Assistance OOB with selected safe patient handling equipment/Communicate Risk of Fall with Harm to all staff/Monitor gait and stability/Reinforce activity limits and safety measures with patient and family/Sit up slowly, dangle for a short time, stand at bedside before walking/Tailored Fall Risk Interventions/Visual Cue: Yellow wristband and red socks/Bed in lowest position, wheels locked, appropriate side rails in place/Call bell, personal items and telephone in reach/Instruct patient to call for assistance before getting out of bed or chair/Non-slip footwear when patient is out of bed/[place] to call system/Physically safe environment - no spills, clutter or unnecessary equipment/Purposeful Proactive Rounding/Room/bathroom lighting operational, light cord in reach

## 2024-02-11 NOTE — ED ADULT TRIAGE NOTE - CHIEF COMPLAINT QUOTE
Transfer from [facility] for new acute changes on head CT. Pt originally going to ED C/OP worsening headache, N/V. PT with hx of brain CA and partial resection. Brought to CC room.

## 2024-02-11 NOTE — H&P ADULT - NSICDXPASTMEDICALHX_GEN_ALL_CORE_FT
PAST MEDICAL HISTORY:  GBM (glioblastoma multiforme)     HLD (hyperlipidemia)     Hypertension     Melanoma

## 2024-02-11 NOTE — H&P ADULT - HISTORY OF PRESENT ILLNESS
58M with PMHx of HTN, HLD, melanoma x3 last resection 1 year ago, well-known to our service for known GBM s/p R crani for GBM resection in 12/29/2023 currently on TMZ and just started XRT on Monday presents as transfer  from [facility] for worsening HA. Pt reports he was doing well until he started having severe HAs 2 days ago, now w/ nausea and vomiting that started today. Has had intermittent blurred vision for the last 2 months that was present pre-op. Mild L facial also present pre-op still present. Denies any dizziness. CTH at [facility] showed 10 x 5.6 x 8.2 cm with solid and cystic components w/ effacement of R lateral ventricle with 1 cm shift to L.

## 2024-02-11 NOTE — ED PROVIDER NOTE - ATTENDING CONTRIBUTION TO CARE
58-year-old male, seen with resident, arrives to our ED as transfer from [facility].  Patient with a recent history of glioblastoma, status postsurgical resection.  Patient complaining of 2 days of worsening headache, unrelieved by home medication.  At [facility], CT revealed increasing lesions with mass effect.  Transferred here for further care.  On arrival to ED, patient is awake and alert, in no acute distress, with a GCS of 15.  No gross deficits on exam.  No respiratory distress.  Speaking in full sentences.  Abdomen is soft nontender.  Patient received Decadron at outside hospital.  Discussed with neurosurgical providers, plan to admit to neuro ICU under Dr. Ian Ramsey.

## 2024-02-11 NOTE — ED ADULT NURSE NOTE - CHIEF COMPLAINT QUOTE
Transfer from [facility] for new acute changes on head CT. Pt originally going to ED C/OP worsening headache, N/V. PT with hx of brain CA and partial resection. Brought to CC room.

## 2024-02-11 NOTE — ED ADULT NURSE REASSESSMENT NOTE - NIH STROKE SCALE: 7. LIMB ATAXIA, QM
(0) Absent Azathioprine Counseling:  I discussed with the patient the risks of azathioprine including but not limited to myelosuppression, immunosuppression, hepatotoxicity, lymphoma, and infections.  The patient understands that monitoring is required including baseline LFTs, Creatinine, possible TPMP genotyping and weekly CBCs for the first month and then every 2 weeks thereafter.  The patient verbalized understanding of the proper use and possible adverse effects of azathioprine.  All of the patient's questions and concerns were addressed.

## 2024-02-11 NOTE — ED ADULT NURSE NOTE - OBJECTIVE STATEMENT
pt reports to the ED as [place] [facility] [place]t to worsening HA. pt a&ox4 on arrival, states yesterday he developed a HA, pushed though the day and woke up this morning and stated "I couldn't do another day with this HA so I went to the ER" pt had recent crani at [facility] 12/29, started on chemo Monday. yesterday and today he has experienced nausea and vomiting. pt also admits to blurred vision. pt had CT at [facility] and noted with madhu.

## 2024-02-11 NOTE — ED PROVIDER NOTE - CLINICAL SUMMARY MEDICAL DECISION MAKING FREE TEXT BOX
59 y/o male w/glioblastoma s/p resection presenting with headaches at [facility]. CT showed effacement of R lateral ventricle with a midline shift. Admitted to Neurosurgery.

## 2024-02-11 NOTE — H&P ADULT - ASSESSMENT
58M with PMHx of HTN, HLD, melanoma x3 last resection 1 year ago, well-known to our service for known GBM s/p R crani for GBM resection in 12/29/2023 currently on TMZ and just started XRT on Monday presents as transfer  from [facility] for worsening HA. CTH at [facility] showed 10 x 5.6 x 8.2 cm with solid and cystic components w/ effacement of R lateral ventricle with 1 cm shift to L.       PLAN:  - Will d/w attending  Neuro:  - HOB 30 degrees  - Neuro checks q 1 hours, vital checks q 1 hours  - STAT CTH for any changes in neuro exam  - MRI brain w/wo w/brain lab protocol. Fiducials placed by neurosx  - Pain control PRN. Avoid narcotics   - Decadron 4 mg Q 6 for cerebral edema  - Keppra 500 mg Q12   - NeuroSx following.     Respiratory:  - Incentive spirometry  - on RA    CV:  - Normotensive goals  - SBP goal < 160  - PRN Hydralazine/labetalol     Endocrine:  - Goal euglycemia    Heme/Onc:  - DVT ppx SCDs  - Call heme/onc and rad/onc consult in am     Renal:  - NS@ 75 ml/hour  - Normonatremia     ID:  - Afebrile     GI:  - DASH diet  - Protonix 40mg qd  - Bowel regimen: Miralax/senna     58M with PMHx of HTN, HLD, melanoma x3 last resection 1 year ago, well-known to our service for known GBM s/p R crani for GBM resection in 12/29/2023 currently on TMZ and just started XRT on Monday presents as transfer  from [facility] for worsening HA. CTH at [facility] showed 10 x 5.6 x 8.2 cm with solid and cystic components w/ effacement of R lateral ventricle with 1 cm shift to L.       PLAN:  - Will d/w attending  Neuro:  - HOB 30 degrees  - Neuro checks q 1 hours, vital checks q 1 hours  - STAT CTH for any changes in neuro exam  - MRI brain w/wo w/brain lab protocol. Fiducials placed by neurosx  - Pain control PRN. Avoid narcotics   - Decadron 4 mg Q 6 for cerebral edema  - Keppra 500 mg Q12   - NeuroSx following.     Respiratory:  - Incentive spirometry  - on RA    CV:  - Normotensive goals  - SBP goal < 160  - PRN Hydralazine/labetalol     Endocrine:  - Goal euglycemia    Heme/Onc:  - DVT ppx SCDs  - Call heme/onc and rad/onc consult in am     Renal:  - NS@ 75 ml/hour  - BMP q12h   - Normonatremia     ID:  - Afebrile     GI:  - DASH diet  - Protonix 40mg qd  - Bowel regimen: Miralax/senna

## 2024-02-11 NOTE — ED ADULT NURSE REASSESSMENT NOTE - NS ED NURSE REASSESS COMMENT FT1
Assumed care of patient at 1930 in CC area of ED presenting as transfer from [facility] for new 1cm left midline shift.  Pt is A&Ox4, denies sob/chest pain, appears resting on stretcher in NAD at this time admitted to NeuroICU and pending transfer to room.  NIH 2 for left sided peripheral vision loss, left sided facial asymmetry on smiling.  Report given, safety maintained with bed locked, in lowest position, family at bedside.

## 2024-02-11 NOTE — ED ADULT NURSE NOTE - NSFALLUNIVINTERV_ED_ALL_ED
Bed/Stretcher in lowest position, wheels locked, appropriate side rails in place/Call bell, personal items and telephone in reach/Instruct patient to call for assistance before getting out of bed/chair/stretcher/Non-slip footwear applied when patient is off stretcher/[place] to call system/Physically safe environment - no spills, clutter or unnecessary equipment/Purposeful proactive rounding/Room/bathroom lighting operational, light cord in reach

## 2024-02-11 NOTE — CONSULT NOTE ADULT - SUBJECTIVE AND OBJECTIVE BOX
HISTORY OF PRESENT ILLNESS:   58M with PMHx of HTN, HLD, melanoma x3 last resection 1 year ago, well-known to our service for known GBM s/p R crani for GBM resection in 12/29/2023 currently on TMZ and just started XRT on Monday presents as transfer  from [facility] for worsening HA. Pt reports he was doing well until he started having severe HAs 2 days ago, now w/ nausea and vomiting that started today. Has had intermittent blurred vision for the last 2 months that was present pre-op. Mild L facial also present pre-op still present. Denies any dizziness. CTH at [facility] showed 10 x 5.6 x 8.2 cm with solid and cystic components w/ effacement of R lateral ventricle with 1 cm shift to L.       PAST MEDICAL & SURGICAL HISTORY:  Melanoma      Hypertension      HLD (hyperlipidemia)      S/P rotator cuff repair    GBM on TMZ and XRT        FAMILY HISTORY:  No pertinent family history in first degree relatives      Allergies    No Known Allergies    Intolerances        REVIEW OF SYSTEMS  Negative except as noted in HPI      HOME MEDICATIONS:  Home Medications:  Multiple Vitamins oral tablet: 1 tab(s) orally once a day (29 Dec 2023 05:48)  TMZ 5mg 3 caps qhs, along w/ 2 caps of 20mg, and 3 caps 5mg  Decadron 4mg QD  Keppra 500mg BID        MEDICATIONS:  Antibiotics:    Neuro:    Anticoagulation:    OTHER:    IVF:      Vital Signs Last 24 Hrs  T(C): 36.4 (11 Feb 2024 18:19), Max: 36.4 (11 Feb 2024 18:19)  T(F): 97.5 (11 Feb 2024 18:19), Max: 97.5 (11 Feb 2024 18:19)  HR: 69 (11 Feb 2024 18:19) (69 - 69)  BP: 134/85 (11 Feb 2024 18:19) (134/85 - 134/85)  BP(mean): --  RR: 20 (11 Feb 2024 18:19) (20 - 20)  SpO2: 94% (11 Feb 2024 18:19) (94% - 94%)    Parameters below as of 11 Feb 2024 18:19  Patient On (Oxygen Delivery Method): room air          PHYSICAL EXAM:  GENERAL: NAD, well-groomed  HEAD:  Atraumatic, normocephalic   WOUND: Well healed  EYES: Conjunctiva and sclera clear  ENMT: Good dentition  NECK: Supple, nontender to palpation  CECILY COMA SCORE: E- V- M- = 15       E: 4= opens eyes spontaneously 3= to voice 2= to noxious 1= no opening       V: 5= oriented 4= confused 3= inappropriate words 2= incomprehensible sounds 1= nonverbal 1T= intubated       M: 6= follows commands 5= localizes 4= withdraws 3= flexor posturing 2= extensor posturing 1= no movement  MENTAL STATUS: AAO x3; Awake; Opens eyes spontaneously/; Appropriately conversant without aphasia; following simple commands  CRANIAL NERVES: PERRL. EOMI without nystagmus. Facial sensation intact V1-3 distribution b/l. Mild L facial, tongue midline. Hearing grossly intact. Speech clear. Head turning and shoulder shrug intact.   MOTOR: strength 5/5 b/l upper and lower extremities  SENSATION: grossly intact to light touch all extremities  COORDINATION: Gait testing deferred; no upper extremity dysmetria  CHEST/LUNG: Nonlabored on room air, no accessory muscle use  SKIN: Warm, dry    LABS:  Labs reviewed from [facility]        RADIOLOGY & ADDITIONAL STUDIES:    ACC: [id number] EXAM: CT BRAIN   PROCEDURE DATE: 02/11/2024    IMPRESSION: Progression of the known right GBM measuring 10 x 5.6 x 8.2 cm with solid and cystic components. There is effacement of the right lateral ventricle with 1 cm shift to the left.        MR Head w/wo IV Cont (12.30.23 @ 16:05)  IMPRESSION:  Partial resection of heterogeneous mass in RIGHT basal   ganglia with residual surgical cavity containing fluid and hemorrhage.   Continued effacement of RIGHT lateral ventricle and 3 mm subfalcine   herniation to the LEFT.

## 2024-02-11 NOTE — ED PROVIDER NOTE - CARE PLAN
1 Principal Discharge DX:	Midline shift of brain with brain compression  Secondary Diagnosis:	Glioblastoma

## 2024-02-11 NOTE — ED ADULT TRIAGE NOTE - CCCP TRG CHIEF CMPLNT
headache Z Plasty Text: The lesion was extirpated to the level of the fat with a #15 scalpel blade.  Given the location of the defect, shape of the defect and the proximity to free margins a Z-plasty was deemed most appropriate for repair.  Using a sterile surgical marker, the appropriate transposition arms of the Z-plasty were drawn incorporating the defect and placing the expected incisions within the relaxed skin tension lines where possible.    The area thus outlined was incised deep to adipose tissue with a #15 scalpel blade.  The skin margins were undermined to an appropriate distance in all directions utilizing iris scissors.  The opposing transposition arms were then transposed into place in opposite direction and anchored with interrupted buried subcutaneous sutures.

## 2024-02-12 LAB
ALBUMIN SERPL ELPH-MCNC: 3.8 G/DL — SIGNIFICANT CHANGE UP (ref 3.3–5.2)
ALP SERPL-CCNC: 87 U/L — SIGNIFICANT CHANGE UP (ref 40–120)
ALT FLD-CCNC: 35 U/L — SIGNIFICANT CHANGE UP
ANION GAP SERPL CALC-SCNC: 13 MMOL/L — SIGNIFICANT CHANGE UP (ref 5–17)
ANION GAP SERPL CALC-SCNC: 13 MMOL/L — SIGNIFICANT CHANGE UP (ref 5–17)
ANION GAP SERPL CALC-SCNC: 16 MMOL/L — SIGNIFICANT CHANGE UP (ref 5–17)
APTT BLD: 27.7 SEC — SIGNIFICANT CHANGE UP (ref 24.5–35.6)
AST SERPL-CCNC: 13 U/L — SIGNIFICANT CHANGE UP
BILIRUB DIRECT SERPL-MCNC: 0.2 MG/DL — SIGNIFICANT CHANGE UP (ref 0–0.3)
BILIRUB INDIRECT FLD-MCNC: 0.6 MG/DL — SIGNIFICANT CHANGE UP (ref 0.2–1)
BILIRUB SERPL-MCNC: 0.7 MG/DL — SIGNIFICANT CHANGE UP (ref 0.4–2)
BUN SERPL-MCNC: 7.5 MG/DL — LOW (ref 8–20)
BUN SERPL-MCNC: 7.6 MG/DL — LOW (ref 8–20)
BUN SERPL-MCNC: 8.2 MG/DL — SIGNIFICANT CHANGE UP (ref 8–20)
CALCIUM SERPL-MCNC: 8.8 MG/DL — SIGNIFICANT CHANGE UP (ref 8.4–10.5)
CALCIUM SERPL-MCNC: 8.9 MG/DL — SIGNIFICANT CHANGE UP (ref 8.4–10.5)
CALCIUM SERPL-MCNC: 9.1 MG/DL — SIGNIFICANT CHANGE UP (ref 8.4–10.5)
CHLORIDE SERPL-SCNC: 96 MMOL/L — SIGNIFICANT CHANGE UP (ref 96–108)
CHLORIDE SERPL-SCNC: 96 MMOL/L — SIGNIFICANT CHANGE UP (ref 96–108)
CHLORIDE SERPL-SCNC: 97 MMOL/L — SIGNIFICANT CHANGE UP (ref 96–108)
CO2 SERPL-SCNC: 21 MMOL/L — LOW (ref 22–29)
CO2 SERPL-SCNC: 24 MMOL/L — SIGNIFICANT CHANGE UP (ref 22–29)
CO2 SERPL-SCNC: 24 MMOL/L — SIGNIFICANT CHANGE UP (ref 22–29)
CREAT SERPL-MCNC: 0.6 MG/DL — SIGNIFICANT CHANGE UP (ref 0.5–1.3)
CREAT SERPL-MCNC: 0.64 MG/DL — SIGNIFICANT CHANGE UP (ref 0.5–1.3)
CREAT SERPL-MCNC: 0.66 MG/DL — SIGNIFICANT CHANGE UP (ref 0.5–1.3)
EGFR: 108 ML/MIN/1.73M2 — SIGNIFICANT CHANGE UP
EGFR: 109 ML/MIN/1.73M2 — SIGNIFICANT CHANGE UP
EGFR: 111 ML/MIN/1.73M2 — SIGNIFICANT CHANGE UP
GLUCOSE BLDC GLUCOMTR-MCNC: 133 MG/DL — HIGH (ref 70–99)
GLUCOSE BLDC GLUCOMTR-MCNC: 140 MG/DL — HIGH (ref 70–99)
GLUCOSE SERPL-MCNC: 130 MG/DL — HIGH (ref 70–99)
GLUCOSE SERPL-MCNC: 153 MG/DL — HIGH (ref 70–99)
GLUCOSE SERPL-MCNC: 155 MG/DL — HIGH (ref 70–99)
HCT VFR BLD CALC: 44.7 % — SIGNIFICANT CHANGE UP (ref 39–50)
HGB BLD-MCNC: 16.3 G/DL — SIGNIFICANT CHANGE UP (ref 13–17)
INR BLD: 0.94 RATIO — SIGNIFICANT CHANGE UP (ref 0.85–1.18)
MAGNESIUM SERPL-MCNC: 2.2 MG/DL — SIGNIFICANT CHANGE UP (ref 1.6–2.6)
MCHC RBC-ENTMCNC: 31.2 PG — SIGNIFICANT CHANGE UP (ref 27–34)
MCHC RBC-ENTMCNC: 36.5 GM/DL — HIGH (ref 32–36)
MCV RBC AUTO: 85.5 FL — SIGNIFICANT CHANGE UP (ref 80–100)
MRSA PCR RESULT.: SIGNIFICANT CHANGE UP
PHOSPHATE SERPL-MCNC: 3.5 MG/DL — SIGNIFICANT CHANGE UP (ref 2.4–4.7)
PLATELET # BLD AUTO: 285 K/UL — SIGNIFICANT CHANGE UP (ref 150–400)
POTASSIUM SERPL-MCNC: 4.3 MMOL/L — SIGNIFICANT CHANGE UP (ref 3.5–5.3)
POTASSIUM SERPL-MCNC: 4.5 MMOL/L — SIGNIFICANT CHANGE UP (ref 3.5–5.3)
POTASSIUM SERPL-MCNC: 4.5 MMOL/L — SIGNIFICANT CHANGE UP (ref 3.5–5.3)
POTASSIUM SERPL-SCNC: 4.3 MMOL/L — SIGNIFICANT CHANGE UP (ref 3.5–5.3)
POTASSIUM SERPL-SCNC: 4.5 MMOL/L — SIGNIFICANT CHANGE UP (ref 3.5–5.3)
POTASSIUM SERPL-SCNC: 4.5 MMOL/L — SIGNIFICANT CHANGE UP (ref 3.5–5.3)
PROT SERPL-MCNC: 6.3 G/DL — LOW (ref 6.6–8.7)
PROTHROM AB SERPL-ACNC: 10.5 SEC — SIGNIFICANT CHANGE UP (ref 9.5–13)
RBC # BLD: 5.23 M/UL — SIGNIFICANT CHANGE UP (ref 4.2–5.8)
RBC # FLD: 12.8 % — SIGNIFICANT CHANGE UP (ref 10.3–14.5)
S AUREUS DNA NOSE QL NAA+PROBE: SIGNIFICANT CHANGE UP
SODIUM SERPL-SCNC: 133 MMOL/L — LOW (ref 135–145)
SODIUM SERPL-SCNC: 133 MMOL/L — LOW (ref 135–145)
SODIUM SERPL-SCNC: 134 MMOL/L — LOW (ref 135–145)
WBC # BLD: 10.85 K/UL — HIGH (ref 3.8–10.5)
WBC # FLD AUTO: 10.85 K/UL — HIGH (ref 3.8–10.5)

## 2024-02-12 PROCEDURE — 99233 SBSQ HOSP IP/OBS HIGH 50: CPT | Mod: 24

## 2024-02-12 PROCEDURE — 70553 MRI BRAIN STEM W/O & W/DYE: CPT | Mod: 26

## 2024-02-12 PROCEDURE — 99291 CRITICAL CARE FIRST HOUR: CPT

## 2024-02-12 PROCEDURE — 93010 ELECTROCARDIOGRAM REPORT: CPT

## 2024-02-12 RX ORDER — LEVETIRACETAM 250 MG/1
1000 TABLET, FILM COATED ORAL ONCE
Refills: 0 | Status: DISCONTINUED | OUTPATIENT
Start: 2024-02-13 | End: 2024-02-13

## 2024-02-12 RX ORDER — CEFAZOLIN SODIUM 1 G
1000 VIAL (EA) INJECTION ONCE
Refills: 0 | Status: COMPLETED | OUTPATIENT
Start: 2024-02-13 | End: 2024-02-13

## 2024-02-12 RX ORDER — CHLORHEXIDINE GLUCONATE 213 G/1000ML
1 SOLUTION TOPICAL DAILY
Refills: 0 | Status: DISCONTINUED | OUTPATIENT
Start: 2024-02-12 | End: 2024-02-15

## 2024-02-12 RX ORDER — VALPROIC ACID (AS SODIUM SALT) 250 MG/5ML
250 SOLUTION, ORAL ORAL EVERY 12 HOURS
Refills: 0 | Status: DISCONTINUED | OUTPATIENT
Start: 2024-02-12 | End: 2024-02-13

## 2024-02-12 RX ORDER — SODIUM CHLORIDE 5 G/100ML
1000 INJECTION, SOLUTION INTRAVENOUS
Refills: 0 | Status: DISCONTINUED | OUTPATIENT
Start: 2024-02-12 | End: 2024-02-13

## 2024-02-12 RX ORDER — VALPROIC ACID (AS SODIUM SALT) 250 MG/5ML
250 SOLUTION, ORAL ORAL ONCE
Refills: 0 | Status: COMPLETED | OUTPATIENT
Start: 2024-02-12 | End: 2024-02-12

## 2024-02-12 RX ORDER — TRAMADOL HYDROCHLORIDE 50 MG/1
50 TABLET ORAL ONCE
Refills: 0 | Status: DISCONTINUED | OUTPATIENT
Start: 2024-02-12 | End: 2024-02-12

## 2024-02-12 RX ORDER — ACETAMINOPHEN 500 MG
1000 TABLET ORAL ONCE
Refills: 0 | Status: DISCONTINUED | OUTPATIENT
Start: 2024-02-12 | End: 2024-02-14

## 2024-02-12 RX ORDER — SODIUM CHLORIDE 9 MG/ML
1 INJECTION INTRAMUSCULAR; INTRAVENOUS; SUBCUTANEOUS EVERY 8 HOURS
Refills: 0 | Status: DISCONTINUED | OUTPATIENT
Start: 2024-02-12 | End: 2024-02-12

## 2024-02-12 RX ORDER — TRAMADOL HYDROCHLORIDE 50 MG/1
50 TABLET ORAL EVERY 6 HOURS
Refills: 0 | Status: DISCONTINUED | OUTPATIENT
Start: 2024-02-12 | End: 2024-02-12

## 2024-02-12 RX ORDER — ACETAMINOPHEN 500 MG
1000 TABLET ORAL ONCE
Refills: 0 | Status: COMPLETED | OUTPATIENT
Start: 2024-02-12 | End: 2024-02-12

## 2024-02-12 RX ORDER — GABAPENTIN 400 MG/1
100 CAPSULE ORAL EVERY 8 HOURS
Refills: 0 | Status: DISCONTINUED | OUTPATIENT
Start: 2024-02-12 | End: 2024-02-15

## 2024-02-12 RX ADMIN — Medication 400 MILLIGRAM(S): at 01:13

## 2024-02-12 RX ADMIN — TRAMADOL HYDROCHLORIDE 50 MILLIGRAM(S): 50 TABLET ORAL at 12:00

## 2024-02-12 RX ADMIN — LEVETIRACETAM 400 MILLIGRAM(S): 250 TABLET, FILM COATED ORAL at 17:09

## 2024-02-12 RX ADMIN — Medication 650 MILLIGRAM(S): at 11:01

## 2024-02-12 RX ADMIN — SENNA PLUS 2 TABLET(S): 8.6 TABLET ORAL at 22:00

## 2024-02-12 RX ADMIN — TRAMADOL HYDROCHLORIDE 50 MILLIGRAM(S): 50 TABLET ORAL at 17:16

## 2024-02-12 RX ADMIN — Medication 1000 MILLIGRAM(S): at 08:00

## 2024-02-12 RX ADMIN — POLYETHYLENE GLYCOL 3350 17 GRAM(S): 17 POWDER, FOR SOLUTION ORAL at 11:09

## 2024-02-12 RX ADMIN — TRAMADOL HYDROCHLORIDE 50 MILLIGRAM(S): 50 TABLET ORAL at 17:30

## 2024-02-12 RX ADMIN — LEVETIRACETAM 400 MILLIGRAM(S): 250 TABLET, FILM COATED ORAL at 06:13

## 2024-02-12 RX ADMIN — PANTOPRAZOLE SODIUM 40 MILLIGRAM(S): 20 TABLET, DELAYED RELEASE ORAL at 11:02

## 2024-02-12 RX ADMIN — Medication 400 MILLIGRAM(S): at 07:41

## 2024-02-12 RX ADMIN — TRAMADOL HYDROCHLORIDE 50 MILLIGRAM(S): 50 TABLET ORAL at 11:01

## 2024-02-12 RX ADMIN — Medication 650 MILLIGRAM(S): at 12:00

## 2024-02-12 RX ADMIN — Medication 4 MILLIGRAM(S): at 14:03

## 2024-02-12 RX ADMIN — SODIUM CHLORIDE 1 GRAM(S): 9 INJECTION INTRAMUSCULAR; INTRAVENOUS; SUBCUTANEOUS at 06:13

## 2024-02-12 RX ADMIN — Medication 52.5 MILLIGRAM(S): at 15:33

## 2024-02-12 RX ADMIN — Medication 1000 MILLIGRAM(S): at 01:28

## 2024-02-12 RX ADMIN — GABAPENTIN 100 MILLIGRAM(S): 400 CAPSULE ORAL at 22:00

## 2024-02-12 RX ADMIN — SODIUM CHLORIDE 50 MILLILITER(S): 5 INJECTION, SOLUTION INTRAVENOUS at 12:16

## 2024-02-12 RX ADMIN — TRAMADOL HYDROCHLORIDE 50 MILLIGRAM(S): 50 TABLET ORAL at 23:16

## 2024-02-12 RX ADMIN — Medication 102 MILLIGRAM(S): at 02:05

## 2024-02-12 RX ADMIN — Medication 4 MILLIGRAM(S): at 20:00

## 2024-02-12 RX ADMIN — CHLORHEXIDINE GLUCONATE 1 APPLICATION(S): 213 SOLUTION TOPICAL at 11:03

## 2024-02-12 RX ADMIN — Medication 4 MILLIGRAM(S): at 07:40

## 2024-02-12 NOTE — PROGRESS NOTE ADULT - SUBJECTIVE AND OBJECTIVE BOX
58M with PMHx of HTN, HLD, melanoma x3 last resection 1 year ago, well-known to our service for known GBM s/p R crani for GBM resection in 12/29/2023 currently on TMZ and just started XRT on Monday presents as transfer  from [facility] [place] [facility] for worsening HA. Pt reports he was doing well until he started having severe HAs 2 days ago, now w/ nausea and vomiting that started today. Has had intermittent blurred vision for the last 2 months that was present pre-op. Mild L facial also present pre-op still present. Denies any dizziness. CTH at [facility] showed 10 x 5.6 x 8.2 cm with solid and cystic components w/ effacement of R lateral ventricle with 1 cm shift to L. (02/11/2024)    O/n events: none reported.    ICU Vital Signs Last 24 Hrs  T(C): 36.8 (12 Feb 2024 12:11), Max: 36.9 (12 Feb 2024 04:00)  T(F): 98.2 (12 Feb 2024 12:11), Max: 98.5 (12 Feb 2024 04:00)  HR: 65 (12 Feb 2024 13:00) (65 - 95)  BP: 146/88 (12 Feb 2024 13:00) (134/85 - 170/107)  BP(mean): 107 (12 Feb 2024 13:00) (100 - 124)  ABP: --  ABP(mean): --  RR: 16 (12 Feb 2024 13:00) (10 - 24)  SpO2: 97% (12 Feb 2024 13:00) (94% - 99%)    O2 Parameters below as of 12 Feb 2024 13:00  Patient On (Oxygen Delivery Method): room air    PHYSICAL EXAM:  GENERAL: NAD, cooperative.  MENTAL STATUS: AAO x3; Awake; Opens eyes spontaneously/; Appropriately conversant without aphasia; following simple commands  CRANIAL NERVES: PERRL. EOMI without nystagmus. Mild L facial, tongue midline. Hearing grossly intact. Speech clear.   MOTOR: strength 5/5 b/l upper and lower extremities, no drift.  SENSATION: grossly intact to light touch all extremities  S1S2 present.  CTAB  Abd soft, NT, ND  SKIN: Warm, dry

## 2024-02-12 NOTE — PROGRESS NOTE ADULT - ASSESSMENT
58M with PMHx of HTN, HLD, melanoma x3 last resection 1 year ago, well-known to our service for known GBM s/p R crani for GBM resection in 12/29/2023 currently on TMZ and just started XRT on Monday [place] [facility] [place]. Has had intermittent blurred vision for the last 2 months that was present pre-op. Mild L facial also present pre-op still present. [place] [facility] [place] x 5.6 x 8.2 cm with solid and cystic components w/ effacement of R lateral ventricle with 1 cm shift to L.     PLAN:    -Q1h neuro checks  -STAT CTH for any changes in neuro exam  -MRI brain w/wo w/ brainlab protocol; fiducials placed   -Pain control prn, avoid oversedation  --160  -Continue Keppra 500mg BID  -Decadron 4q6h; ISS and PPI while on steroid   -Heme/onc and rad/onc consults in am  -Keep NPO except meds pending rounds with Dr. Deleon   -Bowel regimen: senna, miralax  -VTE prophylaxis: SCDs  -Further medical management per NSICU  -Will discuss further in am with Dr. Deleon on rounds    58M with PMHx of HTN, HLD, melanoma x3 last resection 1 year ago, well-known to our service for known GBM s/p R crani for GBM resection in 12/29/2023 currently on TMZ and just started XRT on Monday [place] [facility] [place]. Has had intermittent blurred vision for the last 2 months that was present pre-op. Mild L facial also present pre-op still present. CTH at [facility] [place] showed 10 x 5.6 x 8.2 cm with solid and cystic components w/ effacement of R lateral ventricle with 1 cm shift to L.     PLAN:  -Q1h neuro checks  -STAT CTH for any changes in neuro exam  -MRI brain w/wo w/ brainlab protocol; fiducials placed   -Pain control prn, avoid oversedation  --160  -Continue Keppra 500mg BID  -Decadron 4q6h; ISS and PPI while on steroid   -Heme/onc and rad/onc consults in am  -Ok for diet today  -Bowel regimen: senna, miralax  -VTE prophylaxis: SCDs  -Further medical management per NSICU  -OR Tuesday afternoon for repeat surgery/debulking after MRI  -Will discuss further in am with Dr. Deleon on rounds

## 2024-02-12 NOTE — PROGRESS NOTE ADULT - ASSESSMENT
58M with worsening symptoms of large GBM with vasogenic edema and mass effect, s/p R crani for GBM resection in 12/29/2023, on TMZ, started RT.  PMHx of HTN, HLD, melanoma x3 last resection 1 year ago.    PLAN:  - HOB 30-45 degrees; neurochecks  - Pain control - add  PO BID, increase Tramadol 50 q4 hrs PRN, add Gabapentin 100 TID PO  - Decadron 4 mg Q 6 for cerebral edema, cont   - Keppra 500 mg Q12 for sz ppx  - possible OR in am  - Onc consult  - cont Incentive spirometry  - SBP goal 100-160 ; PRN Hydralazine/labetalol   - diet as tolerated; BM regimen; PPI as on steroids; NPO MN for OR  - switch to 2% @50, aim for Na 135-140 today; BMP q8hrs  - -180, ISS as on steroids  - DVT ppx SCDs, hold chemoppx for possible OR in am

## 2024-02-12 NOTE — SBIRT NOTE ADULT - NSSBIRTALCPOSREINDET_GEN_A_CORE
Healthy guidelines discussed with pt. SW Student offered pt substance use resources but pt declined at this time.   Note written by [employer] Student Peg Patel.

## 2024-02-12 NOTE — PROGRESS NOTE ADULT - SUBJECTIVE AND OBJECTIVE BOX
HPI:  58M with PMHx of HTN, HLD, melanoma x3 last resection 1 year ago, well-known to our service for known GBM s/p R crani for GBM resection in 12/29/2023 currently on TMZ and just started XRT on Monday presents as transfer  from [facility] [place] for [place]. Pt reports he was doing well until he started having severe HAs 2 days ago, now w/ nausea and vomiting that started today. Has had intermittent blurred vision for the last 2 months that was present pre-op. Mild L facial also present pre-op still present. Denies any dizziness. CTH at [facility] [place] showed 10 x 5.6 x 8.2 cm with solid and cystic components w/ effacement of R lateral ventricle with 1 cm shift to L.  (11 Feb 2024 19:11)      INTERVAL HPI/OVERNIGHT EVENTS:  58y Male with GBM. Seen and examined overnight. Patient reports continued HA. Slightly helped with tramadol but still 5/10. Still with associated nausea/vomiting. Patient given HA cocktail Tylenol, mg, and reglan as well as increase dexamethasone 10mg IVP x1 ON. Neuro exam remains stable       Vital Signs Last 24 Hrs  T(C): 36.7 (11 Feb 2024 23:00), Max: 36.8 (11 Feb 2024 20:09)  T(F): 98.1 (11 Feb 2024 23:00), Max: 98.3 (11 Feb 2024 20:09)  HR: 72 (11 Feb 2024 23:00) (69 - 82)  BP: 138/99 (11 Feb 2024 23:00) (134/85 - 170/107)  BP(mean): 113 (11 Feb 2024 23:00) (111 - 124)  RR: 16 (11 Feb 2024 23:00) (16 - 20)  SpO2: 96% (11 Feb 2024 23:00) (94% - 98%)    Parameters below as of 11 Feb 2024 23:00  Patient On (Oxygen Delivery Method): room air        PHYSICAL EXAM:  GENERAL: NAD, well-groomed  HEAD:  Atraumatic, normocephalic   WOUND: Well healed  EYES: Conjunctiva and sclera clear  ENMT: Good dentition  NECK: Supple, nontender to palpation  CEICLY COMA SCORE: E- V- M- = 15       E: 4= opens eyes spontaneously 3= to voice 2= to noxious 1= no opening       V: 5= oriented 4= confused 3= inappropriate words 2= incomprehensible sounds 1= nonverbal 1T= intubated       M: 6= follows commands 5= localizes 4= withdraws 3= flexor posturing 2= extensor posturing 1= no movement  MENTAL STATUS: AAO x3; Awake; Opens eyes spontaneously/; Appropriately conversant without aphasia; following simple commands  CRANIAL NERVES: PERRL. EOMI without nystagmus. Facial sensation intact V1-3 distribution b/l. Mild L facial, tongue midline. Hearing grossly intact. Speech clear. Head turning and shoulder shrug intact.   MOTOR: strength 5/5 b/l upper and lower extremities  SENSATION: grossly intact to light touch all extremities  COORDINATION: Gait testing deferred; no upper extremity dysmetria  CHEST/LUNG: Nonlabored on room air, no accessory muscle use  SKIN: Warm, dry    LABS:                        15.5   10.05 )-----------( 267      ( 11 Feb 2024 18:45 )             45.7     02-11    133<L>  |  97  |  6.5<L>  ----------------------------<  130<H>  4.5   |  25.0  |  0.68    Ca    8.9      11 Feb 2024 18:45  Phos  3.7     02-11  Mg     1.9     02-11      PT/INR - ( 11 Feb 2024 18:45 )   PT: 10.6 sec;   INR: 0.95 ratio         PTT - ( 11 Feb 2024 18:45 )  PTT:26.3 sec  Urinalysis Basic - ( 11 Feb 2024 18:45 )    Color: x / Appearance: x / SG: x / pH: x  Gluc: 130 mg/dL / Ketone: x  / Bili: x / Urobili: x   Blood: x / Protein: x / Nitrite: x   Leuk Esterase: x / RBC: x / WBC x   Sq Epi: x / Non Sq Epi: x / Bacteria: x        02-11 @ 07:01  -  02-12 @ 01:23  --------------------------------------------------------  IN: 665 mL / OUT: 850 mL / NET: -185 mL        RADIOLOGY & ADDITIONAL TESTS:  ACC: [id number] EXAM: CT BRAIN   PROCEDURE DATE: 02/11/2024    IMPRESSION: Progression of the known right GBM measuring 10 x 5.6 x 8.2 cm with solid and cystic components. There is effacement of the right lateral ventricle with 1 cm shift to the left.        MR Head w/wo IV Cont (12.30.23 @ 16:05)  IMPRESSION:  Partial resection of heterogeneous mass in RIGHT basal   ganglia with residual surgical cavity containing fluid and hemorrhage.   Continued effacement of RIGHT lateral ventricle and 3 mm subfalcine   herniation to the LEFT.

## 2024-02-13 ENCOUNTER — TRANSCRIPTION ENCOUNTER (OUTPATIENT)
Age: 59
End: 2024-02-13

## 2024-02-13 ENCOUNTER — APPOINTMENT (OUTPATIENT)
Dept: NEUROSURGERY | Facility: HOSPITAL | Age: 59
End: 2024-02-13

## 2024-02-13 LAB
ANION GAP SERPL CALC-SCNC: 12 MMOL/L — SIGNIFICANT CHANGE UP (ref 5–17)
ANION GAP SERPL CALC-SCNC: 12 MMOL/L — SIGNIFICANT CHANGE UP (ref 5–17)
ANION GAP SERPL CALC-SCNC: 13 MMOL/L — SIGNIFICANT CHANGE UP (ref 5–17)
ANION GAP SERPL CALC-SCNC: 14 MMOL/L — SIGNIFICANT CHANGE UP (ref 5–17)
BUN SERPL-MCNC: 10.1 MG/DL — SIGNIFICANT CHANGE UP (ref 8–20)
BUN SERPL-MCNC: 10.2 MG/DL — SIGNIFICANT CHANGE UP (ref 8–20)
BUN SERPL-MCNC: 10.4 MG/DL — SIGNIFICANT CHANGE UP (ref 8–20)
BUN SERPL-MCNC: 11.6 MG/DL — SIGNIFICANT CHANGE UP (ref 8–20)
CALCIUM SERPL-MCNC: 8.8 MG/DL — SIGNIFICANT CHANGE UP (ref 8.4–10.5)
CALCIUM SERPL-MCNC: 9 MG/DL — SIGNIFICANT CHANGE UP (ref 8.4–10.5)
CALCIUM SERPL-MCNC: 9.2 MG/DL — SIGNIFICANT CHANGE UP (ref 8.4–10.5)
CALCIUM SERPL-MCNC: 9.2 MG/DL — SIGNIFICANT CHANGE UP (ref 8.4–10.5)
CHLORIDE SERPL-SCNC: 101 MMOL/L — SIGNIFICANT CHANGE UP (ref 96–108)
CHLORIDE SERPL-SCNC: 102 MMOL/L — SIGNIFICANT CHANGE UP (ref 96–108)
CHLORIDE SERPL-SCNC: 98 MMOL/L — SIGNIFICANT CHANGE UP (ref 96–108)
CHLORIDE SERPL-SCNC: 98 MMOL/L — SIGNIFICANT CHANGE UP (ref 96–108)
CO2 SERPL-SCNC: 24 MMOL/L — SIGNIFICANT CHANGE UP (ref 22–29)
CO2 SERPL-SCNC: 24 MMOL/L — SIGNIFICANT CHANGE UP (ref 22–29)
CO2 SERPL-SCNC: 26 MMOL/L — SIGNIFICANT CHANGE UP (ref 22–29)
CO2 SERPL-SCNC: 26 MMOL/L — SIGNIFICANT CHANGE UP (ref 22–29)
CREAT SERPL-MCNC: 0.7 MG/DL — SIGNIFICANT CHANGE UP (ref 0.5–1.3)
CREAT SERPL-MCNC: 0.71 MG/DL — SIGNIFICANT CHANGE UP (ref 0.5–1.3)
CREAT SERPL-MCNC: 0.71 MG/DL — SIGNIFICANT CHANGE UP (ref 0.5–1.3)
CREAT SERPL-MCNC: 0.81 MG/DL — SIGNIFICANT CHANGE UP (ref 0.5–1.3)
EGFR: 102 ML/MIN/1.73M2 — SIGNIFICANT CHANGE UP
EGFR: 106 ML/MIN/1.73M2 — SIGNIFICANT CHANGE UP
GLUCOSE BLDC GLUCOMTR-MCNC: 109 MG/DL — HIGH (ref 70–99)
GLUCOSE BLDC GLUCOMTR-MCNC: 126 MG/DL — HIGH (ref 70–99)
GLUCOSE SERPL-MCNC: 108 MG/DL — HIGH (ref 70–99)
GLUCOSE SERPL-MCNC: 110 MG/DL — HIGH (ref 70–99)
GLUCOSE SERPL-MCNC: 123 MG/DL — HIGH (ref 70–99)
GLUCOSE SERPL-MCNC: 136 MG/DL — HIGH (ref 70–99)
HCT VFR BLD CALC: 44.1 % — SIGNIFICANT CHANGE UP (ref 39–50)
HCT VFR BLD CALC: 45.3 % — SIGNIFICANT CHANGE UP (ref 39–50)
HGB BLD-MCNC: 15.9 G/DL — SIGNIFICANT CHANGE UP (ref 13–17)
HGB BLD-MCNC: 15.9 G/DL — SIGNIFICANT CHANGE UP (ref 13–17)
MAGNESIUM SERPL-MCNC: 2 MG/DL — SIGNIFICANT CHANGE UP (ref 1.6–2.6)
MAGNESIUM SERPL-MCNC: 2 MG/DL — SIGNIFICANT CHANGE UP (ref 1.6–2.6)
MCHC RBC-ENTMCNC: 31.2 PG — SIGNIFICANT CHANGE UP (ref 27–34)
MCHC RBC-ENTMCNC: 31.5 PG — SIGNIFICANT CHANGE UP (ref 27–34)
MCHC RBC-ENTMCNC: 35.1 GM/DL — SIGNIFICANT CHANGE UP (ref 32–36)
MCHC RBC-ENTMCNC: 36.1 GM/DL — HIGH (ref 32–36)
MCV RBC AUTO: 86.5 FL — SIGNIFICANT CHANGE UP (ref 80–100)
MCV RBC AUTO: 89.9 FL — SIGNIFICANT CHANGE UP (ref 80–100)
PHOSPHATE SERPL-MCNC: 3.6 MG/DL — SIGNIFICANT CHANGE UP (ref 2.4–4.7)
PHOSPHATE SERPL-MCNC: 4.7 MG/DL — SIGNIFICANT CHANGE UP (ref 2.4–4.7)
PLATELET # BLD AUTO: 256 K/UL — SIGNIFICANT CHANGE UP (ref 150–400)
PLATELET # BLD AUTO: 295 K/UL — SIGNIFICANT CHANGE UP (ref 150–400)
POTASSIUM SERPL-MCNC: 4.3 MMOL/L — SIGNIFICANT CHANGE UP (ref 3.5–5.3)
POTASSIUM SERPL-MCNC: 4.4 MMOL/L — SIGNIFICANT CHANGE UP (ref 3.5–5.3)
POTASSIUM SERPL-MCNC: 4.5 MMOL/L — SIGNIFICANT CHANGE UP (ref 3.5–5.3)
POTASSIUM SERPL-MCNC: 5.9 MMOL/L — HIGH (ref 3.5–5.3)
POTASSIUM SERPL-SCNC: 4.3 MMOL/L — SIGNIFICANT CHANGE UP (ref 3.5–5.3)
POTASSIUM SERPL-SCNC: 4.4 MMOL/L — SIGNIFICANT CHANGE UP (ref 3.5–5.3)
POTASSIUM SERPL-SCNC: 4.5 MMOL/L — SIGNIFICANT CHANGE UP (ref 3.5–5.3)
POTASSIUM SERPL-SCNC: 5.9 MMOL/L — HIGH (ref 3.5–5.3)
RBC # BLD: 5.04 M/UL — SIGNIFICANT CHANGE UP (ref 4.2–5.8)
RBC # BLD: 5.1 M/UL — SIGNIFICANT CHANGE UP (ref 4.2–5.8)
RBC # FLD: 13.1 % — SIGNIFICANT CHANGE UP (ref 10.3–14.5)
RBC # FLD: 13.3 % — SIGNIFICANT CHANGE UP (ref 10.3–14.5)
SODIUM SERPL-SCNC: 136 MMOL/L — SIGNIFICANT CHANGE UP (ref 135–145)
SODIUM SERPL-SCNC: 137 MMOL/L — SIGNIFICANT CHANGE UP (ref 135–145)
SODIUM SERPL-SCNC: 138 MMOL/L — SIGNIFICANT CHANGE UP (ref 135–145)
SODIUM SERPL-SCNC: 138 MMOL/L — SIGNIFICANT CHANGE UP (ref 135–145)
WBC # BLD: 11.59 K/UL — HIGH (ref 3.8–10.5)
WBC # BLD: 11.75 K/UL — HIGH (ref 3.8–10.5)
WBC # FLD AUTO: 11.59 K/UL — HIGH (ref 3.8–10.5)
WBC # FLD AUTO: 11.75 K/UL — HIGH (ref 3.8–10.5)

## 2024-02-13 PROCEDURE — 88307 TISSUE EXAM BY PATHOLOGIST: CPT | Mod: 26

## 2024-02-13 PROCEDURE — 61781 SCAN PROC CRANIAL INTRA: CPT | Mod: 58

## 2024-02-13 PROCEDURE — 61510 CRNEC TREPH EXC BRN TUM STTL: CPT | Mod: 82,58

## 2024-02-13 PROCEDURE — 61510 CRNEC TREPH EXC BRN TUM STTL: CPT | Mod: 58

## 2024-02-13 PROCEDURE — 61781 SCAN PROC CRANIAL INTRA: CPT | Mod: 82,58

## 2024-02-13 PROCEDURE — 70553 MRI BRAIN STEM W/O & W/DYE: CPT | Mod: 26

## 2024-02-13 PROCEDURE — 88331 PATH CONSLTJ SURG 1 BLK 1SPC: CPT | Mod: 26

## 2024-02-13 PROCEDURE — 88342 IMHCHEM/IMCYTCHM 1ST ANTB: CPT | Mod: 26

## 2024-02-13 PROCEDURE — 88341 IMHCHEM/IMCYTCHM EA ADD ANTB: CPT | Mod: 26

## 2024-02-13 PROCEDURE — 99024 POSTOP FOLLOW-UP VISIT: CPT

## 2024-02-13 DEVICE — AVITENE: Type: IMPLANTABLE DEVICE | Status: FUNCTIONAL

## 2024-02-13 DEVICE — FLOSEAL WITH RECOTHROM THROMBIN 10ML: Type: IMPLANTABLE DEVICE | Status: FUNCTIONAL

## 2024-02-13 DEVICE — SURGIFOAM PAD 8CM X 12.5CM X 10MM (100): Type: IMPLANTABLE DEVICE | Status: FUNCTIONAL

## 2024-02-13 DEVICE — GRAFT DURAGEN PLUS 3X3IN: Type: IMPLANTABLE DEVICE | Status: FUNCTIONAL

## 2024-02-13 DEVICE — PLATE COVER BURRHOLE UN3 W/ TAB 20MM: Type: IMPLANTABLE DEVICE | Status: FUNCTIONAL

## 2024-02-13 DEVICE — PLATE UN3 STRAIGHT 16 HOLE: Type: IMPLANTABLE DEVICE | Status: FUNCTIONAL

## 2024-02-13 DEVICE — PLATE COVER BURRHOLE UN3 W/TAB 14MM: Type: IMPLANTABLE DEVICE | Status: FUNCTIONAL

## 2024-02-13 DEVICE — SCREW UN3 AXS SELF DRILL 1.5X4MM: Type: IMPLANTABLE DEVICE | Status: FUNCTIONAL

## 2024-02-13 DEVICE — MAYFIELD SKULL PIN ADULT STEEL: Type: IMPLANTABLE DEVICE | Status: FUNCTIONAL

## 2024-02-13 RX ORDER — OXYCODONE HYDROCHLORIDE 5 MG/1
5 TABLET ORAL EVERY 4 HOURS
Refills: 0 | Status: DISCONTINUED | OUTPATIENT
Start: 2024-02-13 | End: 2024-02-14

## 2024-02-13 RX ORDER — ONDANSETRON 8 MG/1
1 TABLET, FILM COATED ORAL
Refills: 0 | DISCHARGE

## 2024-02-13 RX ORDER — ACETAMINOPHEN 500 MG
650 TABLET ORAL EVERY 6 HOURS
Refills: 0 | Status: DISCONTINUED | OUTPATIENT
Start: 2024-02-13 | End: 2024-02-14

## 2024-02-13 RX ORDER — ESCITALOPRAM OXALATE 10 MG/1
1 TABLET, FILM COATED ORAL
Refills: 0 | DISCHARGE

## 2024-02-13 RX ORDER — OXYCODONE HYDROCHLORIDE 5 MG/1
10 TABLET ORAL EVERY 4 HOURS
Refills: 0 | Status: DISCONTINUED | OUTPATIENT
Start: 2024-02-13 | End: 2024-02-15

## 2024-02-13 RX ORDER — ESCITALOPRAM OXALATE 10 MG/1
10 TABLET, FILM COATED ORAL AT BEDTIME
Refills: 0 | Status: DISCONTINUED | OUTPATIENT
Start: 2024-02-14 | End: 2024-02-15

## 2024-02-13 RX ORDER — LEVETIRACETAM 250 MG/1
1000 TABLET, FILM COATED ORAL EVERY 12 HOURS
Refills: 0 | Status: DISCONTINUED | OUTPATIENT
Start: 2024-02-13 | End: 2024-02-15

## 2024-02-13 RX ORDER — SODIUM CHLORIDE 9 MG/ML
1000 INJECTION INTRAMUSCULAR; INTRAVENOUS; SUBCUTANEOUS
Refills: 0 | Status: DISCONTINUED | OUTPATIENT
Start: 2024-02-13 | End: 2024-02-14

## 2024-02-13 RX ORDER — ACETAMINOPHEN 500 MG
1000 TABLET ORAL ONCE
Refills: 0 | Status: COMPLETED | OUTPATIENT
Start: 2024-02-13 | End: 2024-02-13

## 2024-02-13 RX ORDER — CEFAZOLIN SODIUM 1 G
2000 VIAL (EA) INJECTION EVERY 8 HOURS
Refills: 0 | Status: COMPLETED | OUTPATIENT
Start: 2024-02-14 | End: 2024-02-14

## 2024-02-13 RX ORDER — ROSUVASTATIN CALCIUM 5 MG/1
20 TABLET ORAL AT BEDTIME
Refills: 0 | Status: DISCONTINUED | OUTPATIENT
Start: 2024-02-13 | End: 2024-02-15

## 2024-02-13 RX ADMIN — LEVETIRACETAM 1000 MILLIGRAM(S): 250 TABLET, FILM COATED ORAL at 17:59

## 2024-02-13 RX ADMIN — ROSUVASTATIN CALCIUM 20 MILLIGRAM(S): 5 TABLET ORAL at 21:15

## 2024-02-13 RX ADMIN — Medication 1000 MILLIGRAM(S): at 21:31

## 2024-02-13 RX ADMIN — GABAPENTIN 100 MILLIGRAM(S): 400 CAPSULE ORAL at 21:16

## 2024-02-13 RX ADMIN — SENNA PLUS 2 TABLET(S): 8.6 TABLET ORAL at 21:15

## 2024-02-13 RX ADMIN — Medication 400 MILLIGRAM(S): at 21:16

## 2024-02-13 RX ADMIN — Medication 4 MILLIGRAM(S): at 08:43

## 2024-02-13 RX ADMIN — LEVETIRACETAM 400 MILLIGRAM(S): 250 TABLET, FILM COATED ORAL at 05:14

## 2024-02-13 RX ADMIN — Medication 52.5 MILLIGRAM(S): at 03:50

## 2024-02-13 RX ADMIN — SODIUM CHLORIDE 75 MILLILITER(S): 9 INJECTION INTRAMUSCULAR; INTRAVENOUS; SUBCUTANEOUS at 21:19

## 2024-02-13 RX ADMIN — Medication 4 MILLIGRAM(S): at 02:02

## 2024-02-13 RX ADMIN — TRAMADOL HYDROCHLORIDE 50 MILLIGRAM(S): 50 TABLET ORAL at 00:00

## 2024-02-13 RX ADMIN — GABAPENTIN 100 MILLIGRAM(S): 400 CAPSULE ORAL at 05:14

## 2024-02-13 RX ADMIN — Medication 4 MILLIGRAM(S): at 20:17

## 2024-02-13 NOTE — PROGRESS NOTE ADULT - ASSESSMENT
58M with PMHx of HTN, HLD, melanoma x3 last resection 1 year ago, well-known to our service for known GBM s/p R crani for GBM resection in 12/29/2023 currently on TMZ and just started XRT on Monday presents as transfer  from [facility] [place]. Has had intermittent blurred vision for the last 2 months that was present pre-op. Mild L facial also present pre-op still present. CTH at [facility] [place] showed 10 x 5.6 x 8.2 cm with solid and cystic components w/ effacement of R lateral ventricle with 1 cm shift to L.     PLAN:  -Q1h neuro checks. Ok for protect sleep between 12a-4a with q2 neurochecks  -STAT CTH for any changes in neuro exam  -MRI brain w/wo w/ brainlab protocol; reviewed.   -Pain control prn, avoid oversedation  --160  -Continue Keppra 500mg BID  -Decadron 4q6h; ISS and PPI while on steroid   -Heme/onc and rad/onc consults  -NPO except medications for planned resection/debulking today   -Maintain normonatremia (Currently on 2% NaCl @ 50, goal Na 135-140)  -Bowel regimen: senna, miralax  -VTE prophylaxis: SCDs  -Further medical management per NSICU  -Will discuss further in am with Dr. Deleon on rounds      58M with PMHx of HTN, HLD, melanoma x3 last resection 1 year ago, well-known to our service for known GBM s/p R crani for GBM resection in 12/29/2023 currently on TMZ and just started XRT on Monday [place] [facility] [place]. Has had intermittent blurred vision for the last 2 months that was present pre-op. Mild L facial also present pre-op still present. [place] [facility] [place] 10 x 5.6 x 8.2 cm with solid and cystic components w/ effacement of R lateral ventricle with 1 cm shift to L.     PLAN:  -Q1h neuro checks, OR today  -STAT CTH for any changes in neuro exam  -MRI brain w/wo w/ brainlab protocol; reviewed.   -Pain control prn, avoid oversedation  --160  -Continue Keppra 500mg BID  -Decadron 4q6h; ISS and PPI while on steroid   -Heme/onc and rad/onc consults  -NPO except medications for planned resection/debulking today   -Maintain normonatremia (Currently on 2% NaCl @ 50, goal Na 135-140)  -Bowel regimen: senna, miralax  -VTE prophylaxis: SCDs  -Further medical management per NSICU

## 2024-02-13 NOTE — BRIEF OPERATIVE NOTE - NSICDXBRIEFPOSTOP_GEN_ALL_CORE_FT
POST-OP DIAGNOSIS:  Brain tumor 13-Feb-2024 15:06:11  Hal Nova  
POST-OP DIAGNOSIS:  Brain tumor 13-Feb-2024 15:06:11  Hal Nova

## 2024-02-13 NOTE — DIETITIAN INITIAL EVALUATION ADULT - NS FNS DIET ORDER
Diet, NPO after Midnight:      NPO Start Date: 12-Feb-2024,   NPO Start Time: 23:59  Except Medications  With Ice Chips/Sips of Water (02-12-24 @ 18:55)

## 2024-02-13 NOTE — BRIEF OPERATIVE NOTE - COMMENTS
use of BrainLab navigation system and neuromonitoring    I functioned as the first assistant for the craniotomy, tumor resection, and initial hemostasis.  There was no qualified resident available to participate in the case.

## 2024-02-13 NOTE — PHARMACOTHERAPY INTERVENTION NOTE - COMMENTS
Used Surescripts and spoke with patient's son at bedside in regards to patient's home medication list. Outpatient medication list updated    Home Medications:  Crestor 20 mg oral tablet: 1 tab(s) orally once a day (2024 09:16)  dexAMETHasone 4 mg oral tablet: 1 tab(s) orally once a day (2024 09:13)  Lexapro 10 mg oral tablet: 1 tab(s) orally once a day (2024 09:16)  pantoprazole 40 mg oral delayed release tablet: 1 tab(s) orally once a day (2024 09:17)  temozolomide 100 mg oral capsule: orally 155mg once daily (2024 09:16)  Zofran ODT 8 mg oral tablet, disintegratin tab(s) orally prn (2024 09:15)  Keppra 500mg tablet: 1 tab orally twice daily

## 2024-02-13 NOTE — BRIEF OPERATIVE NOTE - COMMENTS
Neuromonitoring conducted throughout case, at baseline at end of case Neuromonitoring conducted throughout case, at baseline at end of case.

## 2024-02-13 NOTE — PROGRESS NOTE ADULT - SUBJECTIVE AND OBJECTIVE BOX
NSICU ATTENDING PROGRESS NOTE    Historical Review:  58M with PMHx of HTN, HLD, melanoma x3 last resection 1 year ago, well-known to our service for known GBM (s/p R crani for GBM resection in 12/29/2023 currently on TMZ and just started XRT on 2/5), presented to [facility] for worsening HA and vomiting. Pt reports he was doing well until he started having severe HAs ~2/9, then developed nausea and vomiting 2/11. Has had intermittent blurred vision for the last 2 months that was present pre-op. Mild L facial also present pre-op still present. Denies any dizziness. CTH at [facility] showed 10 x 5.6 x 8.2 cm with solid and cystic components w/ effacement of R lateral ventricle with 1 cm shift to L. (02/11/2024)    O/n events: ***    ROS:    ---------------------------------------------------------  PHYSICAL EXAM: ***  GENERAL: NAD, cooperative.  MENTAL STATUS: AAO x3; Awake; Opens eyes spontaneously/; Appropriately conversant without aphasia; following simple commands  CRANIAL NERVES: PERRL. EOMI without nystagmus. Mild L facial, tongue midline. Hearing grossly intact. Speech clear.   MOTOR: strength 5/5 b/l upper and lower extremities, no drift.  SENSATION: grossly intact to light touch all extremities    Regular rate and rhythm  breathing comfortably, no accessory muscle use  Abd soft, NT, ND  SKIN: Warm, dry    -----------------------------------------------------------------------------------------------------  ICU Vital Signs Last 24 Hrs  T(C): 36.7 (12 Feb 2024 23:00), Max: 36.9 (12 Feb 2024 20:00)  T(F): 98.1 (12 Feb 2024 23:00), Max: 98.5 (12 Feb 2024 20:00)  HR: 62 (13 Feb 2024 08:00) (60 - 91)  BP: 132/95 (13 Feb 2024 08:00) (122/89 - 153/100)  BP(mean): 109 (13 Feb 2024 08:00) (99 - 121)  ABP: --  ABP(mean): --  RR: 11 (13 Feb 2024 08:00) (10 - 26)  SpO2: 94% (13 Feb 2024 08:00) (94% - 99%)    O2 Parameters below as of 13 Feb 2024 08:00  Patient On (Oxygen Delivery Method): room air            I&O's Summary    12 Feb 2024 07:01  -  13 Feb 2024 07:00  --------------------------------------------------------  IN: 1640 mL / OUT: 2700 mL / NET: -1060 mL        MEDICATIONS  (STANDING):  ceFAZolin  Injectable. 1000 milliGRAM(s) IV Push once  chlorhexidine 2% Cloths 1 Application(s) Topical daily  dexAMETHasone  Injectable 4 milliGRAM(s) IV Push every 6 hours  dextrose 5%. 1000 milliLiter(s) (50 mL/Hr) IV Continuous <Continuous>  dextrose 5%. 1000 milliLiter(s) (100 mL/Hr) IV Continuous <Continuous>  dextrose 50% Injectable 25 Gram(s) IV Push once  dextrose 50% Injectable 12.5 Gram(s) IV Push once  dextrose 50% Injectable 25 Gram(s) IV Push once  gabapentin 100 milliGRAM(s) Oral every 8 hours  glucagon  Injectable 1 milliGRAM(s) IntraMuscular once  insulin lispro (ADMELOG) corrective regimen sliding scale   SubCutaneous three times a day before meals  levETIRAcetam   Injectable 1000 milliGRAM(s) IV Push once  levETIRAcetam  IVPB 500 milliGRAM(s) IV Intermittent every 12 hours  pantoprazole  Injectable 40 milliGRAM(s) IV Push daily  polyethylene glycol 3350 17 Gram(s) Oral daily  senna 2 Tablet(s) Oral at bedtime  sodium chloride 2% . 1000 milliLiter(s) (50 mL/Hr) IV Continuous <Continuous>  valproate sodium  IVPB 250 milliGRAM(s) IV Intermittent every 12 hours      RESPIRATORY:        IMAGING:   Recent imaging studies were reviewed.    LAB RESULTS:                          15.9   11.75 )-----------( 256      ( 13 Feb 2024 01:33 )             44.1       PT/INR - ( 12 Feb 2024 15:35 )   PT: 10.5 sec;   INR: 0.94 ratio         PTT - ( 12 Feb 2024 15:35 )  PTT:27.7 sec    02-13    138  |  101  |  10.4  ----------------------------<  136<H>  4.5   |  24.0  |  0.71    Ca    9.0      13 Feb 2024 01:33  Phos  3.6     02-13  Mg     2.0     02-13    TPro  6.3<L>  /  Alb  3.8  /  TBili  0.7  /  DBili  0.2  /  AST  13  /  ALT  35  /  AlkPhos  87  02-12    -----------------------------------------------------------------------------------------------------------------------------------------------------------------------------------           NSICU ATTENDING PROGRESS NOTE    Historical Review:  58M with PMHx of HTN, HLD, melanoma x3 last resection 1 year ago, well-known to our service for known GBM (s/p R crani for GBM resection in 12/29/2023 currently on TMZ and just started XRT on 2/5), presented to [facility] [place] for worsening HA and vomiting. Pt reports he was doing well until he started having severe HAs ~2/9, then developed nausea and vomiting 2/11. Has had intermittent blurred vision for the last 2 months that was present pre-op. Mild L facial also present pre-op still present. Denies any dizziness. CTH at [facility] [place] showed 10 x 5.6 x 8.2 cm with solid and cystic components w/ effacement of R lateral ventricle with 1 cm shift to L. (02/11/2024)    24h events:   - persistent headache, improved with tramadol 50mg,  TID, VPA 250mg BID  - off balance with walking    ROS: no CP/SOB, no vomiting, no diarrhea    ---------------------------------------------------------  PHYSICAL EXAM:   GENERAL: NAD, cooperative.  MENTAL STATUS: AAO x3; Awake; Opens eyes spontaneously/; Appropriately conversant without aphasia; following simple commands  CRANIAL NERVES: PERRL. EOMI without nystagmus. Mild L facial, tongue midline. Hearing grossly intact. Speech clear.   MOTOR: strength 5/5 b/l upper and lower extremities, no drift.  SENSATION: grossly intact to light touch all extremities    Regular rate and rhythm  breathing comfortably, no accessory muscle use  Abd soft, NT, ND  SKIN: Warm, dry, no swelling  : voiding to urinal    -----------------------------------------------------------------------------------------------------  2% at 50cc/hr    ICU Vital Signs Last 24 Hrs  T(C): 36.7 (12 Feb 2024 23:00), Max: 36.9 (12 Feb 2024 20:00)  T(F): 98.1 (12 Feb 2024 23:00), Max: 98.5 (12 Feb 2024 20:00)  HR: 62 (13 Feb 2024 08:00) (60 - 91)  BP: 132/95 (13 Feb 2024 08:00) (122/89 - 153/100)  BP(mean): 109 (13 Feb 2024 08:00) (99 - 121)  RR: 11 (13 Feb 2024 08:00) (10 - 26)  SpO2: 94% (13 Feb 2024 08:00) (94% - 99%)    O2 Parameters below as of 13 Feb 2024 08:00  Patient On (Oxygen Delivery Method): room air      I&O's Summary    12 Feb 2024 07:01  -  13 Feb 2024 07:00  --------------------------------------------------------  IN: 1640 mL / OUT: 2700 mL / NET: -1060 mL        MEDICATIONS  (STANDING):  ceFAZolin  Injectable. 1000 milliGRAM(s) IV Push once  chlorhexidine 2% Cloths 1 Application(s) Topical daily  dexAMETHasone  Injectable 4 milliGRAM(s) IV Push every 6 hours  dextrose 5%. 1000 milliLiter(s) (50 mL/Hr) IV Continuous <Continuous>  dextrose 5%. 1000 milliLiter(s) (100 mL/Hr) IV Continuous <Continuous>  dextrose 50% Injectable 25 Gram(s) IV Push once  dextrose 50% Injectable 12.5 Gram(s) IV Push once  dextrose 50% Injectable 25 Gram(s) IV Push once  gabapentin 100 milliGRAM(s) Oral every 8 hours  glucagon  Injectable 1 milliGRAM(s) IntraMuscular once  insulin lispro (ADMELOG) corrective regimen sliding scale   SubCutaneous three times a day before meals  levETIRAcetam   Injectable 1000 milliGRAM(s) IV Push once  levETIRAcetam  IVPB 500 milliGRAM(s) IV Intermittent every 12 hours  pantoprazole  Injectable 40 milliGRAM(s) IV Push daily  polyethylene glycol 3350 17 Gram(s) Oral daily  senna 2 Tablet(s) Oral at bedtime  sodium chloride 2% . 1000 milliLiter(s) (50 mL/Hr) IV Continuous <Continuous>  valproate sodium  IVPB 250 milliGRAM(s) IV Intermittent every 12 hours    IMAGING:   Recent imaging studies were reviewed.    LAB RESULTS:                          15.9   11.75 )-----------( 256      ( 13 Feb 2024 01:33 )             44.1       PT/INR - ( 12 Feb 2024 15:35 )   PT: 10.5 sec;   INR: 0.94 ratio      PTT - ( 12 Feb 2024 15:35 )  PTT:27.7 sec    02-13    138  |  101  |  10.4  ----------------------------<  136<H>  4.5   |  24.0  |  0.71    Ca    9.0      13 Feb 2024 01:33  Phos  3.6     02-13  Mg     2.0     02-13    TPro  6.3<L>  /  Alb  3.8  /  TBili  0.7  /  DBili  0.2  /  AST  13  /  ALT  35  /  AlkPhos  87  02-12    -----------------------------------------------------------------------------------------------------------------------------------------------------------------------------------

## 2024-02-13 NOTE — BRIEF OPERATIVE NOTE - NSICDXBRIEFOPLAUNCH_GEN_ALL_CORE
<--- Click to Launch ICDx for PreOp, PostOp and Procedure
Daniela Riley
<--- Click to Launch ICDx for PreOp, PostOp and Procedure

## 2024-02-13 NOTE — PROGRESS NOTE ADULT - SUBJECTIVE AND OBJECTIVE BOX
HPI:58M with PMHx of HTN, HLD, melanoma x3 last resection 1 year ago, well-known to our service for known GBM s/p R crani for GBM resection in 12/29/2023 currently on TMZ and just started XRT on Monday presents as transfer  from [facility] [place] for worsening HA. Pt reports he was doing well until he started having severe HAs 2 days ago, now w/ nausea and vomiting that started today. Has had intermittent blurred vision for the last 2 months that was present pre-op. Mild L facial also present pre-op still present. Denies any dizziness. CTH at [facility] showed 10 x 5.6 x 8.2 cm with solid and cystic components w/ effacement of R lateral ventricle with 1 cm shift to L.  (11 Feb 2024 19:11)      INTERVAL HPI/OVERNIGHT EVENTS:  58y Male seen lying comfortably in bed. Neuro exam remains stable. Patient reports HA 6/10 but states tramadol seems to be helping a little bit.     Vital Signs Last 24 Hrs  T(C): 36.7 (12 Feb 2024 23:00), Max: 36.9 (12 Feb 2024 04:00)  T(F): 98.1 (12 Feb 2024 23:00), Max: 98.5 (12 Feb 2024 04:00)  HR: 60 (13 Feb 2024 00:00) (60 - 95)  BP: 125/97 (13 Feb 2024 00:00) (122/89 - 153/100)  BP(mean): 106 (13 Feb 2024 00:00) (99 - 121)  RR: 15 (13 Feb 2024 00:00) (10 - 26)  SpO2: 96% (13 Feb 2024 00:00) (95% - 99%)    Parameters below as of 13 Feb 2024 00:00  Patient On (Oxygen Delivery Method): room air        PHYSICAL EXAM:  GENERAL: NAD, well-groomed  HEAD:  Atraumatic, normocephalic   WOUND: Well healed  EYES: Conjunctiva and sclera clear  ENMT: Good dentition  NECK: Supple, nontender to palpation  CECILY COMA SCORE: E- V- M- = 15       E: 4= opens eyes spontaneously 3= to voice 2= to noxious 1= no opening       V: 5= oriented 4= confused 3= inappropriate words 2= incomprehensible sounds 1= nonverbal 1T= intubated       M: 6= follows commands 5= localizes 4= withdraws 3= flexor posturing 2= extensor posturing 1= no movement  MENTAL STATUS: AAO x3; Awake; Opens eyes spontaneously/; Appropriately conversant without aphasia; following simple commands  CRANIAL NERVES: PERRL. EOMI without nystagmus. Facial sensation intact V1-3 distribution b/l. Mild L facial, tongue midline. Hearing grossly intact. Speech clear. Head turning and shoulder shrug intact.   MOTOR: strength 5/5 b/l upper and lower extremities  SENSATION: grossly intact to light touch all extremities  COORDINATION: Gait testing deferred; no upper extremity dysmetria  CHEST/LUNG: Nonlabored on room air, no accessory muscle use  SKIN: Warm, dry      LABS:                        16.3   10.85 )-----------( 285      ( 12 Feb 2024 03:22 )             44.7     02-12    134<L>  |  97  |  8.2  ----------------------------<  130<H>  4.5   |  24.0  |  0.64    Ca    9.1      12 Feb 2024 15:35  Phos  3.5     02-12  Mg     2.2     02-12    TPro  6.3<L>  /  Alb  3.8  /  TBili  0.7  /  DBili  0.2  /  AST  13  /  ALT  35  /  AlkPhos  87  02-12    PT/INR - ( 12 Feb 2024 15:35 )   PT: 10.5 sec;   INR: 0.94 ratio         PTT - ( 12 Feb 2024 15:35 )  PTT:27.7 sec  Urinalysis Basic - ( 12 Feb 2024 15:35 )    Color: x / Appearance: x / SG: x / pH: x  Gluc: 130 mg/dL / Ketone: x  / Bili: x / Urobili: x   Blood: x / Protein: x / Nitrite: x   Leuk Esterase: x / RBC: x / WBC x   Sq Epi: x / Non Sq Epi: x / Bacteria: x        02-11 @ 07:01  -  02-12 @ 07:00  --------------------------------------------------------  IN: 1440 mL / OUT: 1250 mL / NET: 190 mL    02-12 @ 07:01 - 02-13 @ 01:22  --------------------------------------------------------  IN: 1090 mL / OUT: 1600 mL / NET: -510 mL        RADIOLOGY & ADDITIONAL TESTS:  2/12/24 MRB w/wo:   IMPRESSION: Interval recurrence of known GBM since prior MRI dated   12/30/2023. The partially solid and partially necrotic lesion measures   6.9 cm AP by 5.6 cm TRV by 4.8 cm cc with moderate surrounding vasogenic   edema. There is mass effect on the RIGHT lateral ventricle and 9 mm shift   to the LEFT.    ACC: [id number] EXAM: CT BRAIN   PROCEDURE DATE: 02/11/2024    IMPRESSION: Progression of the known right GBM measuring 10 x 5.6 x 8.2 cm with solid and cystic components. There is effacement of the right lateral ventricle with 1 cm shift to the left. HPI: 58M with PMHx of HTN, HLD, melanoma x3 last resection 1 year ago, well-known to our service for known GBM s/p R crani for GBM resection in 12/29/2023 currently on TMZ and just started XRT on Monday [place]  [place] [facility] [place]. Pt reports he was doing well until he started having severe HAs 2 days ago, now w/ nausea and vomiting that started today. Has had intermittent blurred vision for the last 2 months that was present pre-op. Mild L facial also present pre-op still present. Denies any dizziness. CTH at [facility] [place] showed 10 x 5.6 x 8.2 cm with solid and cystic components w/ effacement of R lateral ventricle with 1 cm shift to L.  (11 Feb 2024 19:11)      INTERVAL HPI/OVERNIGHT EVENTS:  58y Male seen lying comfortably in bed. Neuro exam remains stable. Patient reports HA 6/10 but states tramadol seems to be helping a little bit.     Vital Signs Last 24 Hrs  T(C): 36.7 (12 Feb 2024 23:00), Max: 36.9 (12 Feb 2024 04:00)  T(F): 98.1 (12 Feb 2024 23:00), Max: 98.5 (12 Feb 2024 04:00)  HR: 60 (13 Feb 2024 00:00) (60 - 95)  BP: 125/97 (13 Feb 2024 00:00) (122/89 - 153/100)  BP(mean): 106 (13 Feb 2024 00:00) (99 - 121)  RR: 15 (13 Feb 2024 00:00) (10 - 26)  SpO2: 96% (13 Feb 2024 00:00) (95% - 99%)    Parameters below as of 13 Feb 2024 00:00  Patient On (Oxygen Delivery Method): room air        PHYSICAL EXAM:  GENERAL: NAD, well-groomed  HEAD:  Atraumatic, normocephalic   WOUND: Well healed  EYES: Conjunctiva and sclera clear  ENMT: Good dentition  NECK: Supple, nontender to palpation  CECILY COMA SCORE: E- V- M- = 15       E: 4= opens eyes spontaneously 3= to voice 2= to noxious 1= no opening       V: 5= oriented 4= confused 3= inappropriate words 2= incomprehensible sounds 1= nonverbal 1T= intubated       M: 6= follows commands 5= localizes 4= withdraws 3= flexor posturing 2= extensor posturing 1= no movement  MENTAL STATUS: AAO x3; Awake; Opens eyes spontaneously/; Appropriately conversant without aphasia; following simple commands  CRANIAL NERVES: PERRL. EOMI without nystagmus. Mild L facial, tongue midline. Hearing grossly intact. Speech clear. Head turning and shoulder shrug intact.   MOTOR: strength 5/5 b/l upper and lower extremities  SENSATION: grossly intact to light touch all extremities  COORDINATION: Gait testing deferred; no upper extremity dysmetria  CHEST/LUNG: Nonlabored on room air, no accessory muscle use  SKIN: Warm, dry      LABS:                        16.3   10.85 )-----------( 285      ( 12 Feb 2024 03:22 )             44.7     02-12    134<L>  |  97  |  8.2  ----------------------------<  130<H>  4.5   |  24.0  |  0.64    Ca    9.1      12 Feb 2024 15:35  Phos  3.5     02-12  Mg     2.2     02-12    TPro  6.3<L>  /  Alb  3.8  /  TBili  0.7  /  DBili  0.2  /  AST  13  /  ALT  35  /  AlkPhos  87  02-12    PT/INR - ( 12 Feb 2024 15:35 )   PT: 10.5 sec;   INR: 0.94 ratio         PTT - ( 12 Feb 2024 15:35 )  PTT:27.7 sec  Urinalysis Basic - ( 12 Feb 2024 15:35 )    Color: x / Appearance: x / SG: x / pH: x  Gluc: 130 mg/dL / Ketone: x  / Bili: x / Urobili: x   Blood: x / Protein: x / Nitrite: x   Leuk Esterase: x / RBC: x / WBC x   Sq Epi: x / Non Sq Epi: x / Bacteria: x        02-11 @ 07:01  -  02-12 @ 07:00  --------------------------------------------------------  IN: 1440 mL / OUT: 1250 mL / NET: 190 mL    02-12 @ 07:01 - 02-13 @ 01:22  --------------------------------------------------------  IN: 1090 mL / OUT: 1600 mL / NET: -510 mL        RADIOLOGY & ADDITIONAL TESTS:  2/12/24 MRB w/wo:   IMPRESSION: Interval recurrence of known GBM since prior MRI dated   12/30/2023. The partially solid and partially necrotic lesion measures   6.9 cm AP by 5.6 cm TRV by 4.8 cm cc with moderate surrounding vasogenic   edema. There is mass effect on the RIGHT lateral ventricle and 9 mm shift   to the LEFT.    ACC: [id number] EXAM: CT BRAIN   PROCEDURE DATE: 02/11/2024    IMPRESSION: Progression of the known right GBM measuring 10 x 5.6 x 8.2 cm with solid and cystic components. There is effacement of the right lateral ventricle with 1 cm shift to the left.

## 2024-02-13 NOTE — BRIEF OPERATIVE NOTE - NSICDXBRIEFPROCEDURE_GEN_ALL_CORE_FT
PROCEDURES:  Right craniotomy for tumor 13-Feb-2024 15:05:55  Hal Nova  
PROCEDURES:  Right craniotomy for tumor 13-Feb-2024 15:05:55  Hal Nova

## 2024-02-13 NOTE — PROGRESS NOTE ADULT - SUBJECTIVE AND OBJECTIVE BOX
POST-OPERATIVE NOTE    Procedure: Redo R craniotomy for R frontotemporal brain tumor resection    Diagnosis/Indication: R frontotemporal brain tumor     Surgeon:    INTERVAL HPI/ACUTE EVENTS:  58yMale w/ PMHx of HTN, HLD, melanoma x3 last resection 1 year ago, well-known to our service for known GBM s/p R crani for GBM resection in 12/29/2023 currently on TMZ and recently started XRT on 2/5 now POD#0 s/p Redo R craniotomy for R frontotemporal brain tumor resection. Patient seen lying comfortably in bed. Reports headache. Some improvement with medication.    VITALS:  T(C): 36.5 (02-13-24 @ 16:45), Max: 36.9 (02-12-24 @ 20:00)  HR: 70 (02-13-24 @ 16:45) (60 - 91)  BP: 149/97 (02-13-24 @ 16:45) (122/89 - 149/105)  RR: 13 (02-13-24 @ 16:45) (11 - 23)  SpO2: 98% (02-13-24 @ 16:45) (94% - 98%)  Wt(kg): --    PHYSICAL EXAM:  GENERAL: NAD  HEAD: Dressing clean, dry, intact.  DRAINS: Subgaleal drain to full hemovac suction. Serosanguinous drainage noted.  NECK: Supple  CECILY COMA SCORE: E- V- M- = 15       E: 4= opens eyes spontaneously 3= to voice 2= to noxious 1= no opening       V: 5= oriented 4= confused 3= inappropriate words 2= incomprehensible sounds 1= nonverbal 1T= intubated       M: 6= follows commands 5= localizes 4= withdraws 3= flexor posturing 2= extensor posturing 1= no movement  MENTAL STATUS: AAO x3; Awake; Opens eyes spontaneously; Answers simple questions, mild word finding difficulties; following simple commands  CRANIAL NERVES: PERRL. EOMI without nystagmus. Facial sensation intact V1-3 distribution b/l. Face symmetric w/ normal eye closure and smile, tongue midline. Hearing grossly intact. Speech clear. Head turning and shoulder shrug intact.   MOTOR: strength RUE/RLE 5/5, LUE 4+/5, LLE proximally 4/5, distally 5/5   SENSATION: grossly intact to light touch all extremities  COORDINATION: Gait testing deferred   CHEST/LUNG: Nonlabored on room air   SKIN: Warm, dry    LABS:                        15.9   11.59 )-----------( 295      ( 13 Feb 2024 17:08 )             45.3     02-13    137  |  98  |  10.2  ----------------------------<  110<H>  4.3   |  26.0  |  0.71    Ca    9.2      13 Feb 2024 11:03  Phos  3.6     02-13  Mg     2.0     02-13    TPro  6.3<L>  /  Alb  3.8  /  TBili  0.7  /  DBili  0.2  /  AST  13  /  ALT  35  /  AlkPhos  87  02-12      RADIOLOGY/OTHER:    MR Brain Stereotactic w/wo IV Cont (02.12.24 @ 10:17)   IMPRESSION: Interval recurrence of known GBM since prior MRI dated   12/30/2023. The partially solid and partially necrotic lesion measures   6.9 cm AP by 5.6 cm TRV by 4.8 cm cc with moderate surrounding vasogenic   edema. There is mass effect on the RIGHT lateral ventricle and 9 mm shift   to the LEFT.      CAPRINI SCORE [CLOT]: 6  Patient has an estimated Caprini score of greater than 5.  However, the patient's unique clinical situation will be addressed in an individual manner to determine appropriate anticoagulation treatment, if any. POST-OPERATIVE NOTE    Procedure: Redo R craniotomy for R frontotemporal brain tumor resection    Diagnosis/Indication: R frontotemporal brain tumor     Surgeon: Hal Castillo    INTERVAL HPI/ACUTE EVENTS:  58yMale w/ PMHx of HTN, HLD, melanoma x3 last resection 1 year ago, well-known to our service for known GBM s/p R crani for GBM resection in 12/29/2023 currently on TMZ and recently started XRT on 2/5 now POD#0 s/p Redo R craniotomy for R frontotemporal brain tumor resection. Patient seen lying comfortably in bed. Reports headache. Some improvement with medication.    VITALS:  T(C): 36.5 (02-13-24 @ 16:45), Max: 36.9 (02-12-24 @ 20:00)  HR: 70 (02-13-24 @ 16:45) (60 - 91)  BP: 149/97 (02-13-24 @ 16:45) (122/89 - 149/105)  RR: 13 (02-13-24 @ 16:45) (11 - 23)  SpO2: 98% (02-13-24 @ 16:45) (94% - 98%)  Wt(kg): --    PHYSICAL EXAM:  GENERAL: NAD  HEAD: Dressing clean, dry, intact.  DRAINS: Subgaleal drain to full hemovac suction. Serosanguinous drainage noted.  NECK: Supple  CECILY COMA SCORE: E- V- M- = 15       E: 4= opens eyes spontaneously 3= to voice 2= to noxious 1= no opening       V: 5= oriented 4= confused 3= inappropriate words 2= incomprehensible sounds 1= nonverbal 1T= intubated       M: 6= follows commands 5= localizes 4= withdraws 3= flexor posturing 2= extensor posturing 1= no movement  MENTAL STATUS: AAO x3; Awake; Opens eyes spontaneously; Answers simple questions, mild word finding difficulties; following simple commands  CRANIAL NERVES: PERRL. EOMI without nystagmus. Facial sensation intact V1-3 distribution b/l. Face symmetric w/ normal eye closure and smile, tongue midline. Hearing grossly intact. Speech clear. Head turning and shoulder shrug intact.   MOTOR: strength RUE/RLE 5/5, LUE 4+/5, LLE proximally 4/5, distally 5/5   SENSATION: grossly intact to light touch all extremities  COORDINATION: Gait testing deferred   CHEST/LUNG: Nonlabored on room air   SKIN: Warm, dry    LABS:                        15.9   11.59 )-----------( 295      ( 13 Feb 2024 17:08 )             45.3     02-13    137  |  98  |  10.2  ----------------------------<  110<H>  4.3   |  26.0  |  0.71    Ca    9.2      13 Feb 2024 11:03  Phos  3.6     02-13  Mg     2.0     02-13    TPro  6.3<L>  /  Alb  3.8  /  TBili  0.7  /  DBili  0.2  /  AST  13  /  ALT  35  /  AlkPhos  87  02-12      RADIOLOGY/OTHER:    MR Brain Stereotactic w/wo IV Cont (02.12.24 @ 10:17)   IMPRESSION: Interval recurrence of known GBM since prior MRI dated   12/30/2023. The partially solid and partially necrotic lesion measures   6.9 cm AP by 5.6 cm TRV by 4.8 cm cc with moderate surrounding vasogenic   edema. There is mass effect on the RIGHT lateral ventricle and 9 mm shift   to the LEFT.      CAPRINI SCORE [CLOT]: 6  Patient has an estimated Caprini score of greater than 5.  However, the patient's unique clinical situation will be addressed in an individual manner to determine appropriate anticoagulation treatment, if any.

## 2024-02-13 NOTE — PROGRESS NOTE ADULT - ASSESSMENT
ASSESSMENT:  58yM w/ PMHx of HTN, HLD, melanoma x3 last resection 1 year ago, well-known to our service for known GBM s/p R crani for GBM resection in 12/29/2023 currently on TMZ and recently started XRT on 2/5 now POD#0 s/p Redo R craniotomy for R frontotemporal brain tumor resection.     PLAN:    -Q1h neuro checks  -CTH tonight; if able to get MRI brain tonight does not need CTH  -STAT CTH for any changes in neuro exam  -MRI brain w/wo   -1 subgaleal hemovac drain to full suction  -Pain control prn, avoid oversedation  -SBP   -Keppra 1g BID  -Decadron 4q6h; ISS and PPI while on steroid   -Ancef x 24 hrs   -Dysphagia screen and then advance diet as tolerated  -Bowel regimen: senna, miralax  -VTE prophylaxis: SCDs, hold chemoprophylaxis due to: post-op bleeding risk  -Activity: PT, OT  -Further medical management per NSICU  -Discussed case with Dr. Deleon ASSESSMENT:  58yM w/ PMHx of HTN, HLD, melanoma x3 last resection 1 year ago, well-known to our service for known GBM s/p R crani for GBM resection in 12/29/2023 currently on TMZ and recently started XRT on 2/5 now POD#0 s/p Redo R craniotomy for R frontotemporal brain tumor resection.     PLAN:  -Q1h neuro checks  -CTH tonight; if able to get MRI brain tonight does not need CTH  -STAT CTH for any changes in neuro exam  -MRI brain w/wo   -1 subgaleal hemovac drain to full suction  -Pain control prn, avoid oversedation  -SBP   -Keppra 1g BID  -Decadron 4q6h; ISS and PPI while on steroid   -Ancef x 24 hrs   -NPO/IVF until morning  -Bowel regimen: senna, miralax  -VTE prophylaxis: SCDs, hold chemoprophylaxis due to: post-op bleeding risk  -Activity: PT, OT  -Further medical management per NSICU  -Discussed case with Dr. Deleon

## 2024-02-13 NOTE — PROGRESS NOTE ADULT - ASSESSMENT
58M with worsening symptoms of large GBM with vasogenic edema and mass effect, s/p R crani for GBM resection in 12/29/2023, on TMZ, started RT.  PMHx of HTN, HLD, melanoma x3 last resection 1 year ago.    PLAN: ***    Neuro:  - OR plan per NSGY  - consult oncology  - HOB 30-45 degrees; neurochecks  - Pain control - add  PO BID, increase Tramadol 50 q4 hrs PRN, add Gabapentin 100 TID PO  - Decadron 4 mg Q 6 for cerebral edema, cont   - Keppra 500 mg Q12 for sz ppx  - possible OR in am    Pulm:  - cont Incentive spirometry    CV:  - SBP goal 100-160  - PRN Hydralazine/labetalol     GI:  - PPI as on steroids  - NPO for OR  - BM regimen    Renal/: #hyponatremia - SIADH vs hypovolemia  - 2% @50, aim for Na 135-140 today  - BMP q8hrs    Endo:  - -180, ISS as on steroids    Heme/Onc:  - Onc consult as above  - DVT ppx SCDs, hold chemoppx for possible OR in am    Dispo: NSICU for close neurologic and hemodynamic monitoring while awaiting OR       58M with worsening symptoms of large GBM with vasogenic edema and mass effect, s/p R crani for GBM resection in 12/29/2023, on TMZ, started RT.  PMHx of HTN, HLD, melanoma x3 last resection 1 year ago.    PLAN:     Neuro:  - OR today per NSGY  - neurochecks q1h  - ICP precautions: HOB 30-45 degrees, normonatremia, normothermia, normocarbia  - Pain control - add  PO BID, increase Tramadol 50 q4 hrs PRN, add Gabapentin 100 TID PO  - Decadron 4 mg Q 6 for cerebral edema, cont   - Keppra 500 mg Q12 for sz ppx  - d/w NSGY re: ongoing sz ppx  - home med: lexapro 10  Mobility: OOB with assistance    Pulm:  - goal O2>92%  - cont Incentive spirometry    CV:  - SBP goal 100-160  - crestor 20 daily  - PRN Hydralazine/labetalol     GI:  - PPI as on steroids  - NPO for OR  - BM regimen  - zofran PRN nausea    Renal/: #hyponatremia - SIADH vs hypovolemia  - 2% @50, aim for Na 135-140 today  - BMP q8hrs    Endo:  - -180, ISS as on steroids    Heme/Onc:  - DVT ppx SCDs  - hold chemoppx for possible OR in am    Dispo: NSICU for close neurologic and hemodynamic monitoring while awaiting OR

## 2024-02-13 NOTE — BRIEF OPERATIVE NOTE - SPECIMENS
1 frozen specimen-frontal brain tumor; 2 permanent specimen-Frontal brain tumor, temporal brain tumor

## 2024-02-13 NOTE — DIETITIAN INITIAL EVALUATION ADULT - PERTINENT MEDS FT
MEDICATIONS  (STANDING):  dexAMETHasone  Injectable 4 milliGRAM(s) IV Push every 6 hours  dextrose 5%. 1000 milliLiter(s) (50 mL/Hr) IV Continuous <Continuous>  glucagon  Injectable 1 milliGRAM(s) IntraMuscular once  insulin lispro (ADMELOG) corrective regimen sliding scale   SubCutaneous three times a day before meals  pantoprazole  Injectable 40 milliGRAM(s) IV Push daily  polyethylene glycol 3350 17 Gram(s) Oral daily  senna 2 Tablet(s) Oral at bedtime  valproate sodium  IVPB 250 milliGRAM(s) IV Intermittent every 12 hours    MEDICATIONS  (PRN):  hydrALAZINE Injectable 10 milliGRAM(s) IV Push every 2 hours PRN SBP> 160  labetalol Injectable 10 milliGRAM(s) IV Push every 2 hours PRN SBP>160  ondansetron Injectable 4 milliGRAM(s) IV Push every 6 hours PRN Nausea and/or Vomiting

## 2024-02-13 NOTE — DIETITIAN INITIAL EVALUATION ADULT - ORAL INTAKE PTA/DIET HISTORY
Pt NPO today for OR for planned resection/debulking. Pt admitted with vomiting/nausea, started on 2/11. Weight this admission 187 lbs. RD to follow up for appropriate diet education when no longer NPO. Remains available.

## 2024-02-13 NOTE — DIETITIAN INITIAL EVALUATION ADULT - OTHER INFO
57 yo male with pmhx of HLD, HTN, melanoma x 3 last resection 1 year ago, glioblastoma multiforme s/p R crani for GBM resection on 12/29/23. Presented to [facility] [place] [facility] with worsening HA, nausea, vomiting started on 2/11. CTH revealed 10 x 5.6 x 8.2 cm with solid and cystic components with effacement of R lateral ventricle with 1 cm shift to L. Plan for OR today with planned resection/debulking.

## 2024-02-14 ENCOUNTER — NON-APPOINTMENT (OUTPATIENT)
Age: 59
End: 2024-02-14

## 2024-02-14 ENCOUNTER — TRANSCRIPTION ENCOUNTER (OUTPATIENT)
Age: 59
End: 2024-02-14

## 2024-02-14 ENCOUNTER — APPOINTMENT (OUTPATIENT)
Dept: HEMATOLOGY ONCOLOGY | Facility: CLINIC | Age: 59
End: 2024-02-14

## 2024-02-14 LAB
ANION GAP SERPL CALC-SCNC: 14 MMOL/L — SIGNIFICANT CHANGE UP (ref 5–17)
APPEARANCE UR: CLEAR — SIGNIFICANT CHANGE UP
BILIRUB UR-MCNC: NEGATIVE — SIGNIFICANT CHANGE UP
BUN SERPL-MCNC: 11.6 MG/DL — SIGNIFICANT CHANGE UP (ref 8–20)
CALCIUM SERPL-MCNC: 7.9 MG/DL — LOW (ref 8.4–10.5)
CHLORIDE SERPL-SCNC: 103 MMOL/L — SIGNIFICANT CHANGE UP (ref 96–108)
CO2 SERPL-SCNC: 20 MMOL/L — LOW (ref 22–29)
COLOR SPEC: SIGNIFICANT CHANGE UP
CREAT SERPL-MCNC: 0.72 MG/DL — SIGNIFICANT CHANGE UP (ref 0.5–1.3)
DIFF PNL FLD: NEGATIVE — SIGNIFICANT CHANGE UP
EGFR: 105 ML/MIN/1.73M2 — SIGNIFICANT CHANGE UP
GLUCOSE BLDC GLUCOMTR-MCNC: 114 MG/DL — HIGH (ref 70–99)
GLUCOSE SERPL-MCNC: 156 MG/DL — HIGH (ref 70–99)
GLUCOSE UR QL: 500 MG/DL
HCT VFR BLD CALC: 40.8 % — SIGNIFICANT CHANGE UP (ref 39–50)
HGB BLD-MCNC: 14.2 G/DL — SIGNIFICANT CHANGE UP (ref 13–17)
KETONES UR-MCNC: ABNORMAL MG/DL
LEUKOCYTE ESTERASE UR-ACNC: NEGATIVE — SIGNIFICANT CHANGE UP
MAGNESIUM SERPL-MCNC: 1.9 MG/DL — SIGNIFICANT CHANGE UP (ref 1.6–2.6)
MCHC RBC-ENTMCNC: 31.1 PG — SIGNIFICANT CHANGE UP (ref 27–34)
MCHC RBC-ENTMCNC: 34.8 GM/DL — SIGNIFICANT CHANGE UP (ref 32–36)
MCV RBC AUTO: 89.5 FL — SIGNIFICANT CHANGE UP (ref 80–100)
NITRITE UR-MCNC: NEGATIVE — SIGNIFICANT CHANGE UP
PH UR: 5.5 — SIGNIFICANT CHANGE UP (ref 5–8)
PHOSPHATE SERPL-MCNC: 3.7 MG/DL — SIGNIFICANT CHANGE UP (ref 2.4–4.7)
PLATELET # BLD AUTO: 204 K/UL — SIGNIFICANT CHANGE UP (ref 150–400)
POTASSIUM SERPL-MCNC: 3.9 MMOL/L — SIGNIFICANT CHANGE UP (ref 3.5–5.3)
POTASSIUM SERPL-SCNC: 3.9 MMOL/L — SIGNIFICANT CHANGE UP (ref 3.5–5.3)
PROT UR-MCNC: SIGNIFICANT CHANGE UP MG/DL
RBC # BLD: 4.56 M/UL — SIGNIFICANT CHANGE UP (ref 4.2–5.8)
RBC # FLD: 13.2 % — SIGNIFICANT CHANGE UP (ref 10.3–14.5)
SODIUM SERPL-SCNC: 137 MMOL/L — SIGNIFICANT CHANGE UP (ref 135–145)
SP GR SPEC: >1.03 — HIGH (ref 1–1.03)
UROBILINOGEN FLD QL: 1 MG/DL — SIGNIFICANT CHANGE UP (ref 0.2–1)
WBC # BLD: 10.97 K/UL — HIGH (ref 3.8–10.5)
WBC # FLD AUTO: 10.97 K/UL — HIGH (ref 3.8–10.5)

## 2024-02-14 PROCEDURE — 99024 POSTOP FOLLOW-UP VISIT: CPT

## 2024-02-14 PROCEDURE — 71045 X-RAY EXAM CHEST 1 VIEW: CPT | Mod: 26

## 2024-02-14 RX ORDER — ACETAMINOPHEN 500 MG
2 TABLET ORAL
Qty: 0 | Refills: 0 | DISCHARGE
Start: 2024-02-14

## 2024-02-14 RX ORDER — ACETAMINOPHEN 500 MG
1000 TABLET ORAL ONCE
Refills: 0 | Status: COMPLETED | OUTPATIENT
Start: 2024-02-14 | End: 2024-02-14

## 2024-02-14 RX ORDER — PANTOPRAZOLE SODIUM 20 MG/1
40 TABLET, DELAYED RELEASE ORAL DAILY
Refills: 0 | Status: DISCONTINUED | OUTPATIENT
Start: 2024-02-14 | End: 2024-02-15

## 2024-02-14 RX ORDER — SENNA PLUS 8.6 MG/1
2 TABLET ORAL
Qty: 0 | Refills: 0 | DISCHARGE
Start: 2024-02-14

## 2024-02-14 RX ORDER — ACETAMINOPHEN 500 MG
1000 TABLET ORAL ONCE
Refills: 0 | Status: DISCONTINUED | OUTPATIENT
Start: 2024-02-14 | End: 2024-02-14

## 2024-02-14 RX ORDER — OXYCODONE HYDROCHLORIDE 5 MG/1
5 TABLET ORAL EVERY 4 HOURS
Refills: 0 | Status: DISCONTINUED | OUTPATIENT
Start: 2024-02-14 | End: 2024-02-15

## 2024-02-14 RX ORDER — BENZOCAINE AND MENTHOL 5; 1 G/100ML; G/100ML
1 LIQUID ORAL ONCE
Refills: 0 | Status: DISCONTINUED | OUTPATIENT
Start: 2024-02-14 | End: 2024-02-15

## 2024-02-14 RX ORDER — DEXAMETHASONE 0.5 MG/5ML
4 ELIXIR ORAL EVERY 12 HOURS
Refills: 0 | Status: CANCELLED | OUTPATIENT
Start: 2024-02-23 | End: 2024-02-15

## 2024-02-14 RX ORDER — OXYCODONE HYDROCHLORIDE 5 MG/1
5 TABLET ORAL ONCE
Refills: 0 | Status: DISCONTINUED | OUTPATIENT
Start: 2024-02-14 | End: 2024-02-14

## 2024-02-14 RX ORDER — POTASSIUM CHLORIDE 20 MEQ
10 PACKET (EA) ORAL ONCE
Refills: 0 | Status: COMPLETED | OUTPATIENT
Start: 2024-02-14 | End: 2024-02-14

## 2024-02-14 RX ORDER — POLYETHYLENE GLYCOL 3350 17 G/17G
17 POWDER, FOR SOLUTION ORAL
Qty: 0 | Refills: 0 | DISCHARGE
Start: 2024-02-14

## 2024-02-14 RX ORDER — DEXAMETHASONE 0.5 MG/5ML
4 ELIXIR ORAL EVERY 6 HOURS
Refills: 0 | Status: DISCONTINUED | OUTPATIENT
Start: 2024-02-14 | End: 2024-02-15

## 2024-02-14 RX ORDER — DEXAMETHASONE 0.5 MG/5ML
4 ELIXIR ORAL EVERY 8 HOURS
Refills: 0 | Status: CANCELLED | OUTPATIENT
Start: 2024-02-18 | End: 2024-02-15

## 2024-02-14 RX ORDER — TRAMADOL HYDROCHLORIDE 50 MG/1
50 TABLET ORAL EVERY 6 HOURS
Refills: 0 | Status: DISCONTINUED | OUTPATIENT
Start: 2024-02-14 | End: 2024-02-14

## 2024-02-14 RX ORDER — MAGNESIUM SULFATE 500 MG/ML
1 VIAL (ML) INJECTION ONCE
Refills: 0 | Status: COMPLETED | OUTPATIENT
Start: 2024-02-14 | End: 2024-02-14

## 2024-02-14 RX ORDER — DEXAMETHASONE 0.5 MG/5ML
4 ELIXIR ORAL DAILY
Refills: 0 | Status: CANCELLED | OUTPATIENT
Start: 2024-02-28 | End: 2024-02-15

## 2024-02-14 RX ORDER — ACETAMINOPHEN 500 MG
975 TABLET ORAL EVERY 6 HOURS
Refills: 0 | Status: DISCONTINUED | OUTPATIENT
Start: 2024-02-14 | End: 2024-02-15

## 2024-02-14 RX ORDER — DEXAMETHASONE 0.5 MG/5ML
ELIXIR ORAL
Refills: 0 | Status: DISCONTINUED | OUTPATIENT
Start: 2024-02-14 | End: 2024-02-15

## 2024-02-14 RX ADMIN — POLYETHYLENE GLYCOL 3350 17 GRAM(S): 17 POWDER, FOR SOLUTION ORAL at 12:15

## 2024-02-14 RX ADMIN — OXYCODONE HYDROCHLORIDE 5 MILLIGRAM(S): 5 TABLET ORAL at 22:48

## 2024-02-14 RX ADMIN — GABAPENTIN 100 MILLIGRAM(S): 400 CAPSULE ORAL at 05:51

## 2024-02-14 RX ADMIN — TRAMADOL HYDROCHLORIDE 50 MILLIGRAM(S): 50 TABLET ORAL at 07:00

## 2024-02-14 RX ADMIN — LEVETIRACETAM 1000 MILLIGRAM(S): 250 TABLET, FILM COATED ORAL at 17:51

## 2024-02-14 RX ADMIN — OXYCODONE HYDROCHLORIDE 5 MILLIGRAM(S): 5 TABLET ORAL at 16:31

## 2024-02-14 RX ADMIN — GABAPENTIN 100 MILLIGRAM(S): 400 CAPSULE ORAL at 21:46

## 2024-02-14 RX ADMIN — OXYCODONE HYDROCHLORIDE 5 MILLIGRAM(S): 5 TABLET ORAL at 17:31

## 2024-02-14 RX ADMIN — GABAPENTIN 100 MILLIGRAM(S): 400 CAPSULE ORAL at 13:42

## 2024-02-14 RX ADMIN — Medication 4 MILLIGRAM(S): at 17:51

## 2024-02-14 RX ADMIN — Medication 1000 MILLIGRAM(S): at 10:15

## 2024-02-14 RX ADMIN — Medication 4 MILLIGRAM(S): at 02:21

## 2024-02-14 RX ADMIN — Medication 2000 MILLIGRAM(S): at 08:03

## 2024-02-14 RX ADMIN — Medication 2000 MILLIGRAM(S): at 00:11

## 2024-02-14 RX ADMIN — PANTOPRAZOLE SODIUM 40 MILLIGRAM(S): 20 TABLET, DELAYED RELEASE ORAL at 12:15

## 2024-02-14 RX ADMIN — ROSUVASTATIN CALCIUM 20 MILLIGRAM(S): 5 TABLET ORAL at 23:24

## 2024-02-14 RX ADMIN — Medication 400 MILLIGRAM(S): at 09:21

## 2024-02-14 RX ADMIN — TRAMADOL HYDROCHLORIDE 50 MILLIGRAM(S): 50 TABLET ORAL at 06:19

## 2024-02-14 RX ADMIN — CHLORHEXIDINE GLUCONATE 1 APPLICATION(S): 213 SOLUTION TOPICAL at 05:52

## 2024-02-14 RX ADMIN — Medication 975 MILLIGRAM(S): at 17:54

## 2024-02-14 RX ADMIN — Medication 4 MILLIGRAM(S): at 08:03

## 2024-02-14 RX ADMIN — LEVETIRACETAM 1000 MILLIGRAM(S): 250 TABLET, FILM COATED ORAL at 05:51

## 2024-02-14 RX ADMIN — ESCITALOPRAM OXALATE 10 MILLIGRAM(S): 10 TABLET, FILM COATED ORAL at 21:47

## 2024-02-14 RX ADMIN — Medication 4 MILLIGRAM(S): at 12:15

## 2024-02-14 RX ADMIN — Medication 4 MILLIGRAM(S): at 23:24

## 2024-02-14 RX ADMIN — OXYCODONE HYDROCHLORIDE 5 MILLIGRAM(S): 5 TABLET ORAL at 21:48

## 2024-02-14 RX ADMIN — OXYCODONE HYDROCHLORIDE 5 MILLIGRAM(S): 5 TABLET ORAL at 23:58

## 2024-02-14 RX ADMIN — Medication 100 GRAM(S): at 05:52

## 2024-02-14 RX ADMIN — Medication 10 MILLIEQUIVALENT(S): at 05:53

## 2024-02-14 RX ADMIN — SENNA PLUS 2 TABLET(S): 8.6 TABLET ORAL at 21:47

## 2024-02-14 NOTE — DISCHARGE NOTE PROVIDER - NSDCACTIVITY_GEN_ALL_CORE
Do not drive or operate machinery/No heavy lifting/straining Do not drive or operate machinery/Showering allowed/No heavy lifting/straining

## 2024-02-14 NOTE — DISCHARGE NOTE PROVIDER - CARE PROVIDER_API CALL
PCP,   Primary Care Provider - Dr. Evie Neff  Please follow-up with your PCP within 1-3 days for transition of care.  Phone: (   )    -  Fax: (   )    -  Follow Up Time: 1-3 days   PCP,   Primary Care Provider - Dr. Evie Neff  Please follow-up with your PCP within 1-3 days for transition of care.  Phone: (   )    -  Fax: (   )    -  Follow Up Time: 1-3 days    Tracie Deleon  [address] [place] [id number]  Phone: [phone]  Fax: [phone]  Follow Up Time:     Anatoly Calix  [address] [place], [place] [id number]  Phone: [phone]  Fax: (   )    -  Follow Up Time:

## 2024-02-14 NOTE — DISCHARGE NOTE PROVIDER - REASON FOR ADMISSION
GMB w/ Vasogenic Edema s/p right craniotomy with right frontotemporal brain tumor resection.  New right GBM w/ Vasogenic Edema s/p right craniotomy with right frontotemporal brain tumor resection.

## 2024-02-14 NOTE — DISCHARGE NOTE PROVIDER - HOSPITAL COURSE
59 yo M with known history of GBM s/p R craniotomy for resection on 12/29/2023 currently on Temozolomide  presented as a transfer from [place] for worsening headache, nausea and vomiting.CT head performed revealing progresssion of known mass with solid/cystic 58M with PMHx of HTN, HLD, melanoma x3 last resection 1 year ago, well-known to our service for known GBM eith vasogenic edema (s/p R crani for GBM resection in 12/29/2023 currently on TMZ and just started XRT on 2/5), [place] [facility] [place], [place] and vomiting. Pt reports he was doing well until he started having severe headaches starting approximately on 2/9/24, patient then developed nausea and vomiting on 2/11/24. Patient has also had intermittent blurred vision for the last 2 months that was present pre-op. Mild left facial also present pre-op still present on this admission. CT head performed revealing progression of known mass 10x 5.6 x 8.2 cm with solid and cystic components w/ effacement of R lateral ventricle with 1 cm shift to the left. Patient is status post right frontotemporal brain tumor resection on 2/13/24 with Dr. Deleon. Patient will be discharged on Keppra 1g BID and on Salt tabs 1g BID. From a neurosurgical standpoint the above patient is medically stable for discharge with close outpatient follow-up.                58 year old male with past medical history of hypertension, hyperlipidemia, melanoma x 3, last resection 1 year ago, well-known to our service with history of admission December 2023 status post right craniotomy for tumor debulking 12/29/23 with Dr. Deleon with pathology consistent with glioblastoma, started on temozolomide for chemotherapy and recently started radiation therapy on 2/5, presented to [facility] ([facility] [place] on 2/11/24 for worsening headache that started on 2/9, nausea and vomiting that started on date of presentation. CT head obtained at [facility] [place] with concern for progression of known mass, 10 x 5.6 x 8.2 cm with solid and cystic components with effacement of right lateral ventricle with 1 cm shift to the left and patient was subsequently transferred to [facility] for neurosurgical evaluation and management for continued care.  - 2/12/24: MRI brain with and without contrast obtained with interval recurrence of known glioblastoma since prior MRI dated 12/30/23.   - 2/13/24: Patient underwent right craniotomy for tumor resection with Dr. Deleon. Subgaleal hemovac drain was left intraoperatively for drainage. Estimated blood loss 300 mL. Neuromonitoring at baseline at completion of procedure. Patient was successfully extubated and transferred back to the intensive care unit  - 2/14/24: Hemovac drain removed. Physical therapy evaluated the patient deeming patient safe for discharge home with supervision/assist as needed, along with outpatient physical therapy. [facility] called to notify concern for potential urinary tract infection. Repeat urinalysis obtained, without concern for infection. Patient downgraded to the floor  - 2/15/24: Complaining of headache. Pain regimen adjusted: flexeril added. CT Head obtained __________________. Patient is stable for discharge home    On 2/15/24 the patient, Fabiano Kovacs, and his son, Fabiano Kovacs, were provided with a brief summary and overview of the aforementioned hospital course including admission date, diagnosis, surgery, complications if applicable, discharge instructions including but not limited to: incision care, signs/symptoms to look out for after discharge, home medications, new medications, patient/family education/training and instructions for follow up. Any concerns were addressed, and all questions were answered.              58 year old male with past medical history of hypertension, hyperlipidemia, melanoma x 3, last resection 1 year ago, well-known to our service with history of admission December 2023 status post right craniotomy for tumor debulking 12/29/23 with Dr. Deleon with pathology consistent with glioblastoma, started on temozolomide for chemotherapy and recently started radiation therapy on 2/5, presented to [facility] ([facility] [place] on 2/11/24 for worsening headache that started on 2/9, nausea and vomiting that started on date of presentation. CT head obtained at [facility] [place] with concern for progression of known mass, 10 x 5.6 x 8.2 cm with solid and cystic components with effacement of right lateral ventricle with 1 cm shift to the left and patient was subsequently transferred to [facility] for neurosurgical evaluation and management for continued care.  - 2/12/24: MRI brain with and without contrast obtained with interval recurrence of known glioblastoma since prior MRI dated 12/30/23.   - 2/13/24: Patient underwent right craniotomy for tumor resection with Dr. Deleon. Subgaleal hemovac drain was left intraoperatively for drainage. Estimated blood loss 300 mL. Neuromonitoring at baseline at completion of procedure. Patient was successfully extubated and transferred back to the intensive care unit  - 2/14/24: Hemovac drain removed. Physical therapy evaluated the patient deeming patient safe for discharge home with supervision/assist as needed, along with outpatient physical therapy. [facility] called to notify concern for potential urinary tract infection. Repeat urinalysis obtained, without concern for infection. Patient downgraded to the floor  - 2/15/24: Complaining of headache. Pain regimen adjusted: flexeril added. CT Head obtained with mild hemorrhage in postoperative site and right basal ganglia. Headache improved. Patient is stable for discharge home    On 2/15/24 the patient, Fabiano Kovacs, and his son, Fabiano Kovacs, were provided with a brief summary and overview of the aforementioned hospital course including admission date, diagnosis, surgery, complications if applicable, discharge instructions including but not limited to: incision care, signs/symptoms to look out for after discharge, home medications, new medications, patient/family education/training and instructions for follow up. Any concerns were addressed, and all questions were answered.

## 2024-02-14 NOTE — PROGRESS NOTE ADULT - SUBJECTIVE AND OBJECTIVE BOX
HPI:  58M with PMHx of HTN, HLD, melanoma x3 last resection 1 year ago, well-known to our service for known GBM s/p R crani for GBM resection in 12/29/2023 currently on TMZ and just started XRT on Monday presents as transfer  from [facility] [place] for [place]. Pt reports he was doing well until he started having severe HAs 2 days ago, now w/ nausea and vomiting that started today. Has had intermittent blurred vision for the last 2 months that was present pre-op. Mild L facial also present pre-op still present. Denies any dizziness. CTH at [facility] [place] showed 10 x 5.6 x 8.2 cm with solid and cystic components w/ effacement of R lateral ventricle with 1 cm shift to L.  (11 Feb 2024 19:11)      INTERVAL HPI/OVERNIGHT EVENTS:  59 y/o Male POD#1 s/p Redo R craniotomy for R frontotemporal brain tumor resection. Patient seen lying comfortably in bed. Reports headache, just more incisional pain not the same HA pre op. States other HA has greatly improved. SG drain in place with 50cc output since surg.     Vital Signs Last 24 Hrs  T(C): 36.9 (14 Feb 2024 00:00), Max: 37.1 (13 Feb 2024 21:35)  T(F): 98.4 (14 Feb 2024 00:00), Max: 98.7 (13 Feb 2024 21:35)  HR: 59 (14 Feb 2024 00:00) (56 - 84)  BP: 108/76 (14 Feb 2024 00:00) (96/61 - 149/97)  BP(mean): 85 (14 Feb 2024 00:00) (73 - 120)  RR: 13 (14 Feb 2024 00:00) (10 - 18)  SpO2: 95% (14 Feb 2024 00:00) (94% - 100%)    Parameters below as of 14 Feb 2024 00:00  Patient On (Oxygen Delivery Method): room air    PHYSICAL EXAM:  GENERAL: NAD  HEAD: Dressing with dried blood (marked), dry, intact.  DRAINS: Subgaleal drain to full hemovac suction. Serosanguinous drainage noted.  NECK: Supple  CECILY COMA SCORE: E- V- M- = 15       E: 4= opens eyes spontaneously 3= to voice 2= to noxious 1= no opening       V: 5= oriented 4= confused 3= inappropriate words 2= incomprehensible sounds 1= nonverbal 1T= intubated       M: 6= follows commands 5= localizes 4= withdraws 3= flexor posturing 2= extensor posturing 1= no movement  MENTAL STATUS: AAO x3; Awake; Opens eyes spontaneously; Answers simple questions, following simple commands  CRANIAL NERVES: PERRL. EOMI without nystagmus. Facial sensation intact V1-3 distribution b/l. Slight L facial w/ normal eye closure and smile, tongue midline. Hearing grossly intact. Speech clear. Head turning and shoulder shrug intact.   MOTOR: strength RUE/RLE 5/5, LUE (HG) 4+/5 very slight drift, LLE proximally 4/5, distally 5/5   SENSATION: grossly intact to light touch all extremities  COORDINATION: Gait testing deferred   CHEST/LUNG: Nonlabored on room air   SKIN: Warm, dry      LABS:                        15.9   11.59 )-----------( 295      ( 13 Feb 2024 17:08 )             45.3     02-13    138  |  102  |  11.6  ----------------------------<  123<H>  4.4   |  24.0  |  0.81    Ca    8.8      13 Feb 2024 17:08  Phos  4.7     02-13  Mg     2.0     02-13    TPro  6.3<L>  /  Alb  3.8  /  TBili  0.7  /  DBili  0.2  /  AST  13  /  ALT  35  /  AlkPhos  87  02-12    PT/INR - ( 12 Feb 2024 15:35 )   PT: 10.5 sec;   INR: 0.94 ratio         PTT - ( 12 Feb 2024 15:35 )  PTT:27.7 sec  Urinalysis Basic - ( 13 Feb 2024 17:08 )    Color: x / Appearance: x / SG: x / pH: x  Gluc: 123 mg/dL / Ketone: x  / Bili: x / Urobili: x   Blood: x / Protein: x / Nitrite: x   Leuk Esterase: x / RBC: x / WBC x   Sq Epi: x / Non Sq Epi: x / Bacteria: x        02-12 @ 07:01 - 02-13 @ 07:00  --------------------------------------------------------  IN: 1640 mL / OUT: 2700 mL / NET: -1060 mL    02-13 @ 07:01  - 02-14 @ 01:25  --------------------------------------------------------  IN: 860 mL / OUT: 920 mL / NET: -60 mL        RADIOLOGY & ADDITIONAL TESTS:  MRI Brain 2/13/24:  FINDINGS:    Patient is status post right frontal craniotomy and tumor debulking in   the right frontal lobe. Previously seen cystic lesion in the right basal   ganglia has been resected. Stable small circular areas of enhancement in   the right basal ganglia on image 126 and image 112 of series 8. Linear   enhancement involves the right frontal lobe surgical tract extending to   the right basal ganglia surgical cavity. Infiltrating neoplasm is   redemonstrated involving the inferior right frontal lobe extending   anteriorly and right temporal lobe on image 17 of series 4. Stable severe   edema in the right frontal lobe. Right to left midline shift measures 7.8   mm. Stable effacement of the right lateral ventricle and third ventricle.   Postsurgical fluid and air and hemorrhage are demonstrated adjacent to   thecraniotomy flap, extradurally. Mild pneumocephalus right frontal   convexity as well as within the anterior right frontal lobe on image 16   of series 5.    There is no abnormal restricted diffusion to suggest acute infarction.    Normal T2 flow-voids are seen within  the intracranial vasculature. No   hydrocephalus. The visualized paranasal sinuses, mastoid air cells and   orbits are unremarkable.      IMPRESSION: Status post post right frontal lobe tumor debulking, as above.

## 2024-02-14 NOTE — DISCHARGE NOTE PROVIDER - NSDCCPCAREPLAN_GEN_ALL_CORE_FT
PRINCIPAL DISCHARGE DIAGNOSIS  Diagnosis: Midline shift of brain with brain compression  Assessment and Plan of Treatment: [place] [facility] [place] after your repeat CT head imaging indicated progression of your known brain mass. During your admission at [facility] you underwent a right craniotomy with fight frontotemporal brain tumor resection with Dr. Tracie Deleon. Please continue to take all of your medications as directed, please follow all dietary recommendations, please maintain adequate hydration and nutrition, and please follow up with all of your healthcare providers as directed.        SECONDARY DISCHARGE DIAGNOSES  Diagnosis: Glioblastoma  Assessment and Plan of Treatment: You were transferred from [facility] [place] neurosurgical evaluation after your repeat CT head imaging indicated progression of your known brain mass. Please make sure you follow-up with your oncology team so that you can resume your treatments as directed by their team. Please continue to take all of your medications as directed, please follow all dietary recommendations, please maintain adequate hydration and nutrition, and please follow up with all of your healthcare providers as directed.     PRINCIPAL DISCHARGE DIAGNOSIS  Diagnosis: Midline shift of brain with brain compression  Assessment and Plan of Treatment: You were transferred from [facility] [place] for further neurosurgical evaluation after your repeat CT head imaging indicated progression of your known brain mass. During your admission at [facility] you underwent a right craniotomy with right frontotemporal brain tumor resection with Dr. Tracie Deleon. Please continue to take all of your medications as directed, please follow all dietary recommendations, please maintain adequate hydration and nutrition, and please follow up with all of your healthcare providers as directed.        SECONDARY DISCHARGE DIAGNOSES  Diagnosis: Glioblastoma  Assessment and Plan of Treatment:

## 2024-02-14 NOTE — DISCHARGE NOTE PROVIDER - NSDCMRMEDTOKEN_GEN_ALL_CORE_FT
Crestor 20 mg oral tablet: 1 tab(s) orally once a day  dexAMETHasone 4 mg oral tablet: 1 tab(s) orally once a day  Keppra 500 mg oral tablet: 1 tab(s) orally 2 times a day  Lexapro 10 mg oral tablet: 1 tab(s) orally once a day  pantoprazole 40 mg oral delayed release tablet: 1 tab(s) orally once a day  temozolomide 100 mg oral capsule: orally 155mg once daily  Zofran ODT 8 mg oral tablet, disintegratin tab(s) orally prn   acetaminophen 325 mg oral tablet: 2 tab(s) orally every 6 hours As needed Temp greater or equal to 38C (100.4F), Mild Pain (1 - 3)  Crestor 20 mg oral tablet: 1 tab(s) orally once a day  cyclobenzaprine 10 mg oral tablet: 1 tab(s) orally 3 times a day Take 1 tablet 3 times a day for incisional pain, can wean off as pain improves  dexAMETHasone 4 mg oral tablet: 1 tab(s) orally every 6 hours Take 1 tablet (4mg) every 6 hours for 3 days (until ), then take 1 tablet (4 mg) every 8 hours for 5 days (until ), then take 1 tablet (4 mg) every 12 hours for 5 days (until ), then take 1 tablet (4 mg) daily until otherwise changed by your oncology and/or neurosurgery clinicians  Keppra 500 mg oral tablet: 1 tab(s) orally 2 times a day  Lexapro 10 mg oral tablet: 1 tab(s) orally once a day  oxyCODONE 5 mg oral tablet: 1 tab(s) orally every 4 hours Take 1 tablet (5 mg) every 4 hours as needed for moderate pain (4-6), or take 2 tablets (total 10 mg) every 4 hours as needed for severe pain (7-10) MDD: 12 tablets  Protonix 40 mg oral delayed release tablet: 1 tab(s) orally once a day  senna leaf extract oral tablet: 2 tab(s) orally once a day (at bedtime) as needed for  constipation  temozolomide 100 mg oral capsule: orally 155mg once daily  Zofran ODT 8 mg oral tablet, disintegratin tab(s) orally prn   acetaminophen 325 mg oral tablet: 2 tab(s) orally every 6 hours As needed Temp greater or equal to 38C (100.4F), Mild Pain (1 - 3)  Crestor 20 mg oral tablet: 1 tab(s) orally once a day  cyclobenzaprine 10 mg oral tablet: 1 tab(s) orally 3 times a day Take 1 tablet 3 times a day for incisional pain, can wean off as pain improves  dexAMETHasone 4 mg oral tablet: 1 tab(s) orally every 6 hours Take 1 tablet (4mg) every 6 hours for 3 days (until ), then take 1 tablet (4 mg) every 8 hours for 5 days (until ), then take 1 tablet (4 mg) every 12 hours for 5 days (until ), then take 1 tablet (4 mg) daily until otherwise changed by your oncology and/or neurosurgery clinicians  Keppra 1000 mg oral tablet: 1 tab(s) orally every 12 hours  Lexapro 10 mg oral tablet: 1 tab(s) orally once a day  oxyCODONE 5 mg oral tablet: 1 tab(s) orally every 4 hours Take 1 tablet (5 mg) every 4 hours as needed for moderate pain (4-6), or take 2 tablets (total 10 mg) every 4 hours as needed for severe pain (7-10) MDD: 12 tablets  Protonix 40 mg oral delayed release tablet: 1 tab(s) orally once a day  senna leaf extract oral tablet: 2 tab(s) orally once a day (at bedtime) as needed for  constipation  Zofran ODT 8 mg oral tablet, disintegratin tab(s) orally prn

## 2024-02-14 NOTE — PROGRESS NOTE ADULT - ASSESSMENT
58yM w/ PMHx of HTN, HLD, melanoma x3 last resection 1 year ago, well-known to our service for known GBM s/p R crani for GBM resection in 12/29/2023 currently on TMZ and recently started XRT on 2/5 now POD#1 s/p Redo R craniotomy for R frontotemporal brain tumor resection.     PLAN:  -Q1h neuro checks  -STAT CTH for any changes in neuro exam  -MRI brain w/wo reviewed with Dr. Deleon   -1 subgaleal hemovac drain to full suction. Monitor and record output. Will likely remove today pending am rounds with Dr. Deleon  -Can remove dressing on POD#2. Can shower on POD#3. Do not scrub incision, use mild shampoo, pat to dry  -Pain control prn, avoid oversedation  -SBP   -Keppra 1g BID  -Decadron 4q6h; ISS and PPI while on steroid   -Ancef x 24 hrs   -Dysphagia screen passed.   -Bowel regimen: senna, miralax  -VTE prophylaxis: SCDs, hold chemoprophylaxis due to: post-op bleeding risk  -Activity: PT, OT  -Further medical management per NSICU  -Will discuss further on am rounds with Dr. Deleon  58yM w/ PMHx of HTN, HLD, melanoma x3 last resection 1 year ago, well-known to our service for known GBM s/p R crani for GBM resection in 12/29/2023 currently on TMZ and recently started XRT on 2/5 now POD#1 s/p Redo R craniotomy for R frontotemporal brain tumor resection.     PLAN:  -Q4 neurochecks  -STAT CTH for any changes in neuro exam  -MRI brain w/wo reviewed with Dr. Deleon   -Drain removed  -Can remove dressing on POD#2. Can shower on POD#3. Do not scrub incision, use mild shampoo, pat to dry  -Pain control prn, avoid oversedation  -SBP   -Keppra 1g BID  -Decadron 4q6h; ISS and PPI while on steroid, 2 weeks to 4mg daily per discussion with Dr. Calix  -Ancef x 24 hrs   -Dysphagia screen passed.   -Bowel regimen: senna, miralax  -VTE prophylaxis: SCDs, hold chemoprophylaxis due to: post-op bleeding risk  -Home today or tomorrow  -Activity: PT, OT  -Further medical management per NSICU  -Will discuss further on am rounds with Dr. Deleon

## 2024-02-14 NOTE — DISCHARGE NOTE PROVIDER - NSDCFUSCHEDAPPT_GEN_ALL_CORE_FT
[facility]  [facility]  Scheduled Appointment: 02/14/2024    [facility]  [facility]  Scheduled Appointment: 02/21/2024    Tracie Deleon  [facility]  NEUROSURG [address] [place] [address] [address]  Scheduled Appointment: 02/28/2024    [facility]  [facility]  Scheduled Appointment: 02/28/2024    [facility]  [facility]  Scheduled Appointment: 03/06/2024    [facility]  [facility]  Scheduled Appointment: 03/13/2024     [facility]  [facility]  Scheduled Appointment: 02/21/2024    Tracie Deleon  [facility]  NEUROSURG [address]  Scheduled Appointment: 02/28/2024    [facility]  [facility]  Scheduled Appointment: 02/28/2024    [facility]  [facility]  Scheduled Appointment: 03/06/2024    [facility]  [facility]  Scheduled Appointment: 03/13/2024     [facility]  [facility]  Scheduled Appointment: 02/21/2024    Anatoly Calix  [facility]  NEUROLOGY [address]  Scheduled Appointment: 02/22/2024    Tracie Deleon  [facility]  NEUROSURG [address]  Scheduled Appointment: 02/28/2024    [facility]  [facility]  Scheduled Appointment: 02/28/2024    [facility]  [facility]  Scheduled Appointment: 03/06/2024    [facility]  [facility]  Scheduled Appointment: 03/13/2024

## 2024-02-14 NOTE — PHYSICAL THERAPY INITIAL EVALUATION ADULT - GENERAL OBSERVATIONS, REHAB EVAL
Pt received in bed, + IV Loc, +Tele//BP  monitoring, breathing on RA in NAD, in 6-7/10 headache pain, son present, agreeable to PT eval

## 2024-02-14 NOTE — PHYSICAL THERAPY INITIAL EVALUATION ADULT - PERTINENT HX OF CURRENT PROBLEM, REHAB EVAL
58M with PMHx of HTN, HLD, melanoma x3 last resection 1 year ago, well-known to our service for known GBM s/p R crani for GBM resection in 12/29/2023 currently on TMZ and just started XRT on Monday [place] [facility] [place]. Pt reports he was doing well until he started having severe HAs 2 days ago, now w/ nausea and vomiting that started today. Has had intermittent blurred vision for the last 2 months that was present pre-op. Mild L facial also present pre-op still present. Denies any dizziness. [place] [facility] [place] 5.6 x 8.2 cm with solid and cystic components w/ effacement of R lateral ventricle with 1 cm shift to L.

## 2024-02-14 NOTE — DISCHARGE NOTE PROVIDER - CARE PROVIDERS DIRECT ADDRESSES
,DirectAddress_Unknown ,DirectAddress_Unknown,[email or web address],DirectAddress_Unknown

## 2024-02-14 NOTE — CHART NOTE - NSCHARTNOTEFT_GEN_A_CORE
HPI:   58M with PMHx of HTN, HLD, melanoma x3 last resection 1 year ago, well-known to our service for known GBM s/p R crani for GBM resection in 12/29/2023 currently on TMZ and just started XRT on Monday [place]  [place] [facility] [place]. Pt reports he was doing well until he started having severe HAs 2 days ago, now w/ nausea and vomiting that started today. Has had intermittent blurred vision for the last 2 months that was present pre-op. Mild L facial also present pre-op still present. Denies any dizziness. [place] at [facility] [place] 10 x 5.6 x 8.2 cm with solid and cystic components w/ effacement of R lateral ventricle with 1 cm shift to L.  (11 Feb 2024 19:11)        Vital Signs Last 24 Hrs  T(C): 36.7 (14 Feb 2024 08:01), Max: 37.1 (13 Feb 2024 21:35)  T(F): 98 (14 Feb 2024 08:01), Max: 98.7 (13 Feb 2024 21:35)  HR: 61 (14 Feb 2024 06:00) (56 - 84)  BP: 117/86 (14 Feb 2024 08:01) (96/61 - 149/97)  BP(mean): 112 (14 Feb 2024 06:00) (73 - 114)  RR: 19 (14 Feb 2024 09:00) (10 - 19)  SpO2: 91% (14 Feb 2024 09:00) (91% - 100%)    Parameters below as of 14 Feb 2024 08:01  Patient On (Oxygen Delivery Method): room air    PHYSICAL EXAM:  GENERAL: NAD  HEAD: Dressing with dried blood (marked), dry, intact.  DRAINS: Subgaleal drain to full hemovac suction. Serosanguinous drainage noted.  NECK: Supple  CECILY COMA SCORE: E- V- M- = 15       E: 4= opens eyes spontaneously 3= to voice 2= to noxious 1= no opening       V: 5= oriented 4= confused 3= inappropriate words 2= incomprehensible sounds 1= nonverbal 1T= intubated       M: 6= follows commands 5= localizes 4= withdraws 3= flexor posturing 2= extensor posturing 1= no movement  MENTAL STATUS: AAO x3; Awake; Opens eyes spontaneously; Answers simple questions, following simple commands  CRANIAL NERVES: PERRL. EOMI without nystagmus. Facial sensation intact V1-3 distribution b/l. Slight L facial w/ normal eye closure and smile, tongue midline. Hearing grossly intact. Speech clear. Head turning and shoulder shrug intact.   MOTOR: strength RUE/RLE 5/5, LUE (HG) 4+/5 very slight drift, LLE proximally 4/5, distally 5/5   SENSATION: grossly intact to light touch all extremities  COORDINATION: Gait testing deferred   CHEST/LUNG: Nonlabored on room air   SKIN: Warm, dry    LABS:                        14.2   10.97 )-----------( 204      ( 14 Feb 2024 03:22 )             40.8     02-14    137  |  103  |  11.6  ----------------------------<  156<H>  3.9   |  20.0<L>  |  0.72    Ca    7.9<L>      14 Feb 2024 03:22  Phos  3.7     02-14  Mg     1.9     02-14      PT/INR - ( 12 Feb 2024 15:35 )   PT: 10.5 sec;   INR: 0.94 ratio         PTT - ( 12 Feb 2024 15:35 )  PTT:27.7 sec  Urinalysis Basic - ( 14 Feb 2024 03:22 )    Color: x / Appearance: x / SG: x / pH: x  Gluc: 156 mg/dL / Ketone: x  / Bili: x / Urobili: x   Blood: x / Protein: x / Nitrite: x   Leuk Esterase: x / RBC: x / WBC x   Sq Epi: x / Non Sq Epi: x / Bacteria: x        02-13 @ 07:01  -  02-14 @ 07:00  --------------------------------------------------------  IN: 1410 mL / OUT: 1725 mL / NET: -315 mL        RADIOLOGY & ADDITIONAL TESTS:  < from: MR Head w/wo IV Cont (02.13.24 @ 19:59) >    IMPRESSION: Status post post right frontal lobe tumor debulking, as above.    --- End of Report ---    < end of copied text >    < from: MR Brain Stereotactic w/wo IV Cont (02.12.24 @ 10:17) >    IMPRESSION: Interval recurrence of known GBM since prior MRI dated   12/30/2023. The partially solid and partially necrotic lesion measures   6.9 cm AP by 5.6 cm TRV by 4.8 cm cc with moderate surrounding vasogenic   edema. There is mass effect on the RIGHT lateral ventricle and 9 mm shift   to the LEFT.    < end of copied text >    < from: CT Head No Cont (12.30.23 @ 04:41) >    IMPRESSION: Partial decompression of the right frontal cystic mass   adjacent to the right caudate head compared with 12/29/2023.    --- End of Report ---      Assessment:  58M with worsening symptoms of large GBM with vasogenic edema and mass effect, s/p R crani for GBM resection in 12/29/2023, on TMZ, started RT.  PMHx of HTN, HLD, melanoma x3 last resection 1 year ago. POD 1 R frontotemporal brain tumor resection     Neuro:  Neurochecks q4  - Decadron taper 2 weeks to 4 mg   - Keppra 500 mg Q12 for sz ppx  - Downgrade to Neurosurgery. Possible D/c tomorrow  - PT assessment: Home with Outpatient PT   Pulm:  - goal O2>92%  - cont Incentive spirometry    CV:  - SBP goal90-160  - crestor 20 daily  - PRN Hydralazine/labetalol     GI:  - PPI as on steroids  - DASH diet   - BM regimen: outpatient  - zofran PRN nausea    Renal/  - voiding   -monitor mets.  - Urine culture @ [facility] + for enteroccocus and proteus mirabilis. recheck UA, plan for Antibiotic coverage if positive.   Endo:  - -180, ISS as on steroids    Heme/Onc:  - DVT ppx SCDs  - lovenox 40 to start today  2/14.

## 2024-02-14 NOTE — DISCHARGE NOTE PROVIDER - NSDCFUADDINST_GEN_ALL_CORE_FT
Please follow-up with all of your scheduled appointments as listed on your discharge paperwork. Ambulate as tolerated with assistance as needed  Diet: You should continue a sodium and cholesterol restricted diet. Ensure a well balanced and proper diet to help with proper wound healing. It is imperative for adequate water intake, dietary fiber intake, and ambulation as tolerated to avoid constipation. Please hold aspirin, NSAIDs, goody's powder, anticoagulation, vitamin E, turmeric/carias lattes, cinnamon pills, fish oil, ginseng, and all other supplements until cleared by your neurosurgeon on an outpatient follow up appointment    Pain: It is common to have a headache or incisional pain after surgery. You may take the pain medication we prescribe, or over the counter Tylenol. Pain medication, especially narcotics, can cause constipation. Use stool softeners or mild laxative as needed.     Medication:  - You have been continued on a new medication, keppra. Keppra helps control and prevent seizures. The most common side effects include diarrhea or constipation, excessive sleepiness, irritability and mood changes.  Rare and sometimes serious side effects include rash.   - You have been continued on a medication called decadron. Decadron is a steroid and helps with swelling. Some common side effects of decadron include increased appetite, irritability, difficulty sleeping, swelling in your ankles, heartburn, and increased sugars. Please notify your doctor of any side effects.   - You have been started on a medication you have taken before, oxycodone. Oxycodone helps to control pain. Oxycodone is an additive medication so it is important to limit the use of this medication. Side effects include nausea, vomiting, constipation, dry mouth, lightheadedness, dizziness or drowsiness. It is important to tell your physician if you experience any of these side effects.  - You have been started on a new medication, flexeril. Flexeril helps treat muscle spasms. Some common side effects of flexeril are dry mouth, headache, blurred vision, drowsiness, dizziness, fatigue, diarrhea or constipation. If you experience any of these side effects please let your doctor know.     Activity: Avoid straining, heavy lifting (objects greater than 10 pounds), strenuous activity, twisting, bending, and vigorous exercise. You should not drive or operate machinery until cleared by your neurosurgeon.     Wound: Monitor your incision for drainage, redness, swelling. Call our office if you have any concerns. You may shower the incision site 3 days after surgery (Friday 2/16/23). While washing, do not rub, scratch, or scrub your incision. You may wash your incision with mild shampoo/soap. Keep the incision open to air. However, if you want to cover the incision, you may, with a clean scarf or hat. When wearing your glasses, use the gauze we provided you to keep it from rubbing on the incision.    Appointment(s): Follow up with Dr. Deleon in our office outpatient in 3 weeks. Call [phone] to schedule an appointment. Sutures/staples will be removed at follow up appointment. It is also important to see your oncologist along with your primary physician to keep them up to date regarding your recent admission and for a formal outpatient comprehensive medical review. Call their offices for an appointment as soon as you leave the hospital. If you do not have a primary physician, you may contact the [facility] [place] [phone] located on [address] [place] [address].    Should you develop any new or worsening neurologic symptoms or have concern about your incision, please call our office immediately or visit the emergency department.    Return to ER immediately for any of the following: fever, bleeding, new onset numbness/tingling/weakness, nausea and/or vomiting, chest pain, shortness of breath, confusion, seizure, altered mental status, urinary and/or fecal incontinence or retention.  Diet: You should continue a sodium and cholesterol restricted diet. Ensure a well balanced and proper diet to help with proper wound healing. It is imperative for adequate water intake, dietary fiber intake, and ambulation as tolerated to avoid constipation. Please hold aspirin, NSAIDs, goody's powder, anticoagulation, vitamin E, turmeric/carias lattes, cinnamon pills, fish oil, ginseng, and all other supplements until cleared by your neurosurgeon on an outpatient follow up appointment    Pain: It is common to have a headache or incisional pain after surgery. You may take the pain medication we prescribe, or over the counter Tylenol. Pain medication, especially narcotics, can cause constipation. Use stool softeners or mild laxative as needed.     Medication:  - Please do not resume your temozolamide until cleared by Dr. Deleon.  - You have been continued on a new medication, keppra. Keppra helps control and prevent seizures. The most common side effects include diarrhea or constipation, excessive sleepiness, irritability and mood changes.  Rare and sometimes serious side effects include rash.   - You have been continued on a medication called decadron. Decadron is a steroid and helps with swelling. Some common side effects of decadron include increased appetite, irritability, difficulty sleeping, swelling in your ankles, heartburn, and increased sugars. Please notify your doctor of any side effects.   - You have been started on a medication you have taken before, oxycodone. Oxycodone helps to control pain. Oxycodone is an additive medication so it is important to limit the use of this medication. Side effects include nausea, vomiting, constipation, dry mouth, lightheadedness, dizziness or drowsiness. It is important to tell your physician if you experience any of these side effects.  - You have been started on a new medication, flexeril. Flexeril helps treat muscle spasms. Some common side effects of flexeril are dry mouth, headache, blurred vision, drowsiness, dizziness, fatigue, diarrhea or constipation. If you experience any of these side effects please let your doctor know.     Activity: Avoid straining, heavy lifting (objects greater than 10 pounds), strenuous activity, twisting, bending, and vigorous exercise. You should not drive or operate machinery until cleared by your neurosurgeon.     Wound: Monitor your incision for drainage, redness, swelling. Call our office if you have any concerns. You may shower the incision site 3 days after surgery (Friday 2/16/23). While washing, do not rub, scratch, or scrub your incision. You may wash your incision with mild shampoo/soap. Keep the incision open to air. However, if you want to cover the incision, you may, with a clean scarf or hat. When wearing your glasses, use the gauze we provided you to keep it from rubbing on the incision.    Appointment(s): Follow up with Dr. Deleon in our office outpatient in 3 weeks. Call [phone] to schedule an appointment. Sutures/staples will be removed at follow up appointment. It is also important to see your oncologist and radiation oncologist, along with your primary physician to keep them up to date regarding your recent admission and for a formal outpatient comprehensive medical review. Call their offices for an appointment as soon as you leave the hospital. If you do not have a primary physician, you may contact the [facility] [place] [phone] located on [address] [place] [address].    Should you develop any new or worsening neurologic symptoms or have concern about your incision, please call our office immediately or visit the emergency department.    Return to ER immediately for any of the following: fever, bleeding, new onset numbness/tingling/weakness, nausea and/or vomiting, chest pain, shortness of breath, confusion, seizure, altered mental status, urinary and/or fecal incontinence or retention.

## 2024-02-14 NOTE — DISCHARGE NOTE PROVIDER - NSDCCAREPROVSEEN_GEN_ALL_CORE_FT
Iain, Abhilash Granger, Radha Deleon, Tracie Ventura, Ian Mckinney, Yeison Bárbara, Radha Deleon, Yeison Boyer

## 2024-02-14 NOTE — PHYSICAL THERAPY INITIAL EVALUATION ADULT - ADDITIONAL COMMENTS
pt normally lives in [place] alone, however has been in [place] living with his parents and cousin, someone is home able to assist prn if needed. Pt has 4 [employer] with handrail and no stairs inside. Independent prior to admission. Has all DME at home if needed.

## 2024-02-14 NOTE — DISCHARGE NOTE PROVIDER - PROVIDER TOKENS
FREE:[LAST:[PCP],PHONE:[(   )    -],FAX:[(   )    -],ADDRESS:[Primary Care Provider - Dr. Evie Neff  Please follow-up with your PCP within 1-3 days for transition of care.],FOLLOWUP:[1-3 days]] FREE:[LAST:[PCP],PHONE:[(   )    -],FAX:[(   )    -],ADDRESS:[Primary Care Provider - Dr. Evie Neff  Please follow-up with your PCP within 1-3 days for transition of care.],FOLLOWUP:[1-3 days]],PROVIDER:[TOKEN:[[id number]]],FREE:[LAST:[],FIRST:[Anatoly],PHONE:[phone]],FAX:[(   )    -],ADDRESS:[[address] [place] [address]]]

## 2024-02-15 ENCOUNTER — TRANSCRIPTION ENCOUNTER (OUTPATIENT)
Age: 59
End: 2024-02-15

## 2024-02-15 VITALS
DIASTOLIC BLOOD PRESSURE: 80 MMHG | OXYGEN SATURATION: 95 % | RESPIRATION RATE: 18 BRPM | TEMPERATURE: 98 F | SYSTOLIC BLOOD PRESSURE: 120 MMHG | HEART RATE: 77 BPM

## 2024-02-15 PROBLEM — C71.9 MALIGNANT NEOPLASM OF BRAIN, UNSPECIFIED: Chronic | Status: ACTIVE | Noted: 2024-02-11

## 2024-02-15 LAB
ANION GAP SERPL CALC-SCNC: 12 MMOL/L — SIGNIFICANT CHANGE UP (ref 5–17)
BUN SERPL-MCNC: 10.6 MG/DL — SIGNIFICANT CHANGE UP (ref 8–20)
CALCIUM SERPL-MCNC: 8.8 MG/DL — SIGNIFICANT CHANGE UP (ref 8.4–10.5)
CHLORIDE SERPL-SCNC: 100 MMOL/L — SIGNIFICANT CHANGE UP (ref 96–108)
CO2 SERPL-SCNC: 26 MMOL/L — SIGNIFICANT CHANGE UP (ref 22–29)
CREAT SERPL-MCNC: 0.76 MG/DL — SIGNIFICANT CHANGE UP (ref 0.5–1.3)
EGFR: 104 ML/MIN/1.73M2 — SIGNIFICANT CHANGE UP
GLUCOSE SERPL-MCNC: 126 MG/DL — HIGH (ref 70–99)
HCT VFR BLD CALC: 42.2 % — SIGNIFICANT CHANGE UP (ref 39–50)
HGB BLD-MCNC: 14.8 G/DL — SIGNIFICANT CHANGE UP (ref 13–17)
MAGNESIUM SERPL-MCNC: 2.1 MG/DL — SIGNIFICANT CHANGE UP (ref 1.6–2.6)
MCHC RBC-ENTMCNC: 31.3 PG — SIGNIFICANT CHANGE UP (ref 27–34)
MCHC RBC-ENTMCNC: 35.1 GM/DL — SIGNIFICANT CHANGE UP (ref 32–36)
MCV RBC AUTO: 89.2 FL — SIGNIFICANT CHANGE UP (ref 80–100)
PHOSPHATE SERPL-MCNC: 3.3 MG/DL — SIGNIFICANT CHANGE UP (ref 2.4–4.7)
PLATELET # BLD AUTO: 217 K/UL — SIGNIFICANT CHANGE UP (ref 150–400)
POTASSIUM SERPL-MCNC: 4.8 MMOL/L — SIGNIFICANT CHANGE UP (ref 3.5–5.3)
POTASSIUM SERPL-SCNC: 4.8 MMOL/L — SIGNIFICANT CHANGE UP (ref 3.5–5.3)
RBC # BLD: 4.73 M/UL — SIGNIFICANT CHANGE UP (ref 4.2–5.8)
RBC # FLD: 13.2 % — SIGNIFICANT CHANGE UP (ref 10.3–14.5)
SODIUM SERPL-SCNC: 138 MMOL/L — SIGNIFICANT CHANGE UP (ref 135–145)
WBC # BLD: 14.43 K/UL — HIGH (ref 3.8–10.5)
WBC # FLD AUTO: 14.43 K/UL — HIGH (ref 3.8–10.5)

## 2024-02-15 PROCEDURE — 86901 BLOOD TYPING SEROLOGIC RH(D): CPT

## 2024-02-15 PROCEDURE — 87641 MR-STAPH DNA AMP PROBE: CPT

## 2024-02-15 PROCEDURE — 86923 COMPATIBILITY TEST ELECTRIC: CPT

## 2024-02-15 PROCEDURE — 36415 COLL VENOUS BLD VENIPUNCTURE: CPT

## 2024-02-15 PROCEDURE — 88341 IMHCHEM/IMCYTCHM EA ADD ANTB: CPT

## 2024-02-15 PROCEDURE — 70553 MRI BRAIN STEM W/O & W/DYE: CPT | Mod: MG

## 2024-02-15 PROCEDURE — A9579: CPT

## 2024-02-15 PROCEDURE — 36430 TRANSFUSION BLD/BLD COMPNT: CPT

## 2024-02-15 PROCEDURE — C1713: CPT

## 2024-02-15 PROCEDURE — 81003 URINALYSIS AUTO W/O SCOPE: CPT

## 2024-02-15 PROCEDURE — 85027 COMPLETE CBC AUTOMATED: CPT

## 2024-02-15 PROCEDURE — 88360 TUMOR IMMUNOHISTOCHEM/MANUAL: CPT

## 2024-02-15 PROCEDURE — 88307 TISSUE EXAM BY PATHOLOGIST: CPT

## 2024-02-15 PROCEDURE — 93005 ELECTROCARDIOGRAM TRACING: CPT

## 2024-02-15 PROCEDURE — 88342 IMHCHEM/IMCYTCHM 1ST ANTB: CPT

## 2024-02-15 PROCEDURE — 85610 PROTHROMBIN TIME: CPT

## 2024-02-15 PROCEDURE — C1889: CPT

## 2024-02-15 PROCEDURE — 83735 ASSAY OF MAGNESIUM: CPT

## 2024-02-15 PROCEDURE — 86900 BLOOD TYPING SEROLOGIC ABO: CPT

## 2024-02-15 PROCEDURE — 80048 BASIC METABOLIC PNL TOTAL CA: CPT

## 2024-02-15 PROCEDURE — 70450 CT HEAD/BRAIN W/O DYE: CPT

## 2024-02-15 PROCEDURE — 70450 CT HEAD/BRAIN W/O DYE: CPT | Mod: 26

## 2024-02-15 PROCEDURE — 84100 ASSAY OF PHOSPHORUS: CPT

## 2024-02-15 PROCEDURE — 87640 STAPH A DNA AMP PROBE: CPT

## 2024-02-15 PROCEDURE — 80076 HEPATIC FUNCTION PANEL: CPT

## 2024-02-15 PROCEDURE — 88331 PATH CONSLTJ SURG 1 BLK 1SPC: CPT

## 2024-02-15 PROCEDURE — 82962 GLUCOSE BLOOD TEST: CPT

## 2024-02-15 PROCEDURE — 86850 RBC ANTIBODY SCREEN: CPT

## 2024-02-15 PROCEDURE — 85730 THROMBOPLASTIN TIME PARTIAL: CPT

## 2024-02-15 PROCEDURE — C9399: CPT

## 2024-02-15 PROCEDURE — G1004: CPT

## 2024-02-15 PROCEDURE — 71045 X-RAY EXAM CHEST 1 VIEW: CPT

## 2024-02-15 RX ORDER — ENOXAPARIN SODIUM 100 MG/ML
40 INJECTION SUBCUTANEOUS
Refills: 0 | Status: DISCONTINUED | OUTPATIENT
Start: 2024-02-15 | End: 2024-02-15

## 2024-02-15 RX ORDER — LEVETIRACETAM 250 MG/1
1 TABLET, FILM COATED ORAL
Qty: 60 | Refills: 0
Start: 2024-02-15 | End: 2024-03-15

## 2024-02-15 RX ORDER — CYCLOBENZAPRINE HYDROCHLORIDE 10 MG/1
1 TABLET, FILM COATED ORAL
Qty: 21 | Refills: 0
Start: 2024-02-15 | End: 2024-02-21

## 2024-02-15 RX ORDER — OXYCODONE HYDROCHLORIDE 5 MG/1
1 TABLET ORAL
Qty: 42 | Refills: 0
Start: 2024-02-15 | End: 2024-02-21

## 2024-02-15 RX ORDER — DEXAMETHASONE 0.5 MG/5ML
1 ELIXIR ORAL
Refills: 0 | DISCHARGE

## 2024-02-15 RX ORDER — CYCLOBENZAPRINE HYDROCHLORIDE 10 MG/1
5 TABLET, FILM COATED ORAL ONCE
Refills: 0 | Status: DISCONTINUED | OUTPATIENT
Start: 2024-02-15 | End: 2024-02-15

## 2024-02-15 RX ORDER — CYCLOBENZAPRINE HYDROCHLORIDE 10 MG/1
10 TABLET, FILM COATED ORAL ONCE
Refills: 0 | Status: COMPLETED | OUTPATIENT
Start: 2024-02-15 | End: 2024-02-15

## 2024-02-15 RX ORDER — ROSUVASTATIN CALCIUM 5 MG/1
1 TABLET ORAL
Qty: 30 | Refills: 0
Start: 2024-02-15 | End: 2024-03-15

## 2024-02-15 RX ORDER — PANTOPRAZOLE SODIUM 20 MG/1
1 TABLET, DELAYED RELEASE ORAL
Refills: 0 | DISCHARGE

## 2024-02-15 RX ORDER — SENNA PLUS 8.6 MG/1
2 TABLET ORAL
Qty: 28 | Refills: 0
Start: 2024-02-15 | End: 2024-02-28

## 2024-02-15 RX ORDER — ROSUVASTATIN CALCIUM 5 MG/1
1 TABLET ORAL
Refills: 0 | DISCHARGE

## 2024-02-15 RX ORDER — ACETAMINOPHEN 500 MG
1000 TABLET ORAL ONCE
Refills: 0 | Status: COMPLETED | OUTPATIENT
Start: 2024-02-15 | End: 2024-02-15

## 2024-02-15 RX ORDER — TEMOZOLOMIDE 140 MG/1
0 CAPSULE ORAL
Refills: 0 | DISCHARGE

## 2024-02-15 RX ORDER — CYCLOBENZAPRINE HYDROCHLORIDE 10 MG/1
10 TABLET, FILM COATED ORAL THREE TIMES A DAY
Refills: 0 | Status: DISCONTINUED | OUTPATIENT
Start: 2024-02-15 | End: 2024-02-15

## 2024-02-15 RX ORDER — DEXAMETHASONE 0.5 MG/5ML
1 ELIXIR ORAL
Qty: 90 | Refills: 0
Start: 2024-02-15

## 2024-02-15 RX ORDER — PANTOPRAZOLE SODIUM 20 MG/1
1 TABLET, DELAYED RELEASE ORAL
Qty: 30 | Refills: 0
Start: 2024-02-15 | End: 2024-03-15

## 2024-02-15 RX ADMIN — CHLORHEXIDINE GLUCONATE 1 APPLICATION(S): 213 SOLUTION TOPICAL at 06:34

## 2024-02-15 RX ADMIN — PANTOPRAZOLE SODIUM 40 MILLIGRAM(S): 20 TABLET, DELAYED RELEASE ORAL at 12:53

## 2024-02-15 RX ADMIN — OXYCODONE HYDROCHLORIDE 10 MILLIGRAM(S): 5 TABLET ORAL at 06:49

## 2024-02-15 RX ADMIN — GABAPENTIN 100 MILLIGRAM(S): 400 CAPSULE ORAL at 06:34

## 2024-02-15 RX ADMIN — Medication 400 MILLIGRAM(S): at 09:52

## 2024-02-15 RX ADMIN — POLYETHYLENE GLYCOL 3350 17 GRAM(S): 17 POWDER, FOR SOLUTION ORAL at 12:53

## 2024-02-15 RX ADMIN — Medication 4 MILLIGRAM(S): at 06:30

## 2024-02-15 RX ADMIN — OXYCODONE HYDROCHLORIDE 5 MILLIGRAM(S): 5 TABLET ORAL at 00:58

## 2024-02-15 RX ADMIN — Medication 1000 MILLIGRAM(S): at 10:11

## 2024-02-15 RX ADMIN — Medication 4 MILLIGRAM(S): at 12:53

## 2024-02-15 RX ADMIN — CYCLOBENZAPRINE HYDROCHLORIDE 10 MILLIGRAM(S): 10 TABLET, FILM COATED ORAL at 12:53

## 2024-02-15 RX ADMIN — LEVETIRACETAM 1000 MILLIGRAM(S): 250 TABLET, FILM COATED ORAL at 06:34

## 2024-02-15 NOTE — PROGRESS NOTE ADULT - REASON FOR ADMISSION
GBM progression
progression of GBM
brain tumor
headaches, nausea/vomiting
headache, nausea/vomiting
worsening headache, nausea/vomiting
headache, nausea/vomiting

## 2024-02-15 NOTE — DISCHARGE NOTE NURSING/CASE MANAGEMENT/SOCIAL WORK - PATIENT PORTAL LINK FT
You can access the FollowMyHealth Patient Portal offered by [facility] by registering at the following website: [email or web address]. By joining [employer]’s FollowMyHealth portal, you will also be able to view your health information using other applications (apps) compatible with our system.

## 2024-02-15 NOTE — PROGRESS NOTE ADULT - NS ATTEND AMEND GEN_ALL_CORE FT
I agree with the above. I personally examined and saw the patient. Headache and baseline left lower facial weakness but otherwise intact. MRI today with OR planning for repeat tumor resection tomorrow. Updated son Michael at bedside.
I agree with the above. I personally examined and saw the patient. Doing well, postop MRI demonstrates excellent resection. Anticipate home today vs. tomorrow.
I agree with the above. I personally examined and saw the patient. Headaches and right eye pain this morning, head CT unremarkable. Improved pain this afternoon. DC home when pain controlled.
I agree with the above. I personally examined and saw the patient. Orientedx3, following commands, baseline left facial droop. Family updated at bedside.
I agree with the above. I personally examined and saw the patient. I spoke with him, his son, and his ex-wife Tammi yesterday at bedside to review the imaging results which demonstrate concern for tumor regrowth. We reviewed the details of surgery and the possible risks and complications. He and his family understood the risks of surgery include, but are not limited to pain, bleeding, infection, CSF leak, seizures, neurological injury including vision, motor, cognition, gait, coordination, sensation changes; blood clots, UTI, pneumonia, stroke, coma, and death. Given how quickly and aggressively this tumor has grown back, I discussed with them that I will attempt a supramarginal resection as safely as possible and will not use 5-ALA at this time as I will go beyond the margins of 5-ALA fluorescence. All questions answered. He wished to proceed and consent forms signed. OR today.
This note represents my independent evaluation and medical decision making for Mr. Kovacs.  Medically complex but not critically ill  Non-critical care time: 35 minutes

## 2024-02-15 NOTE — PROGRESS NOTE ADULT - ASSESSMENT
58M PMH HTN, HLD,, melanoma x 3, last resection 1 year ago, well-known to our service with h/o admission 12/2023 s/p R craniotomy for tumor debulking 12/29/23 with Dr. Deleon with pathology consistent with glioblastoma, started on temozolomide for chemotherapy and recently started radiation therapy on 2/5, [place] [facility] [place] for worsening headache that started on 2/9, nausea and vomiting that started on date of presentation, CTH with concern for progression of known mass, tx to [facility].  - s/p R craniotomy for tumor resection 2/13 POD#2  - CTH today reviewed, postop changes    PLAN:  - D/w Dr. Deleon  - Q4 neuro checks while in house  - Pain regimen adjusted- gabapentin d/nithin, flexeril 10 Q8 standing. Will monitor to see if more controlled with new regimen  - Keppra 1g Q12  - Decadron taper over 2 weeks to 4 mg standing, protonix while on steroids  - Home rosuvastatin QHS  - Normotensive BP goals   - Bowel regimen- senna/miralax. Last BM yesterday 2/14  - Lovenox 40 for DVT ppx started today  - Dispo pending, likely d/c 2/16 if pain better controlled 58M PMH HTN, HLD,, melanoma x 3, last resection 1 year ago, well-known to our service with h/o admission 12/2023 s/p R craniotomy for tumor debulking 12/29/23 with Dr. Deleon with pathology consistent with glioblastoma, started on temozolomide for chemotherapy and recently started radiation therapy on 2/5, presented to [facility] 2/11/24 for worsening headache that started on 2/9, nausea and vomiting that started on date of presentation, CTH with concern for progression of known mass, tx to [facility].  - s/p R craniotomy for tumor resection 2/13 POD#2  - CTH today reviewed, postop changes    PLAN:  - D/w Dr. Deleon  - Q4 neuro checks while in house  - Pain regimen adjusted- gabapentin d/nithin, flexeril 10 Q8 standing. Will monitor to see if more controlled with new regimen  - Keppra 1g Q12  - Decadron taper over 2 weeks to 4 mg standing, protonix while on steroids  - Home rosuvastatin QHS  - Normotensive BP goals   - Bowel regimen- senna/miralax. Last BM yesterday 2/14  - Lovenox 40 for DVT ppx started today  - Dispo pending, plan to d/c today or tomorrow if pain controlled

## 2024-02-15 NOTE — PROGRESS NOTE ADULT - SUBJECTIVE AND OBJECTIVE BOX
INTERVAL HPI/OVERNIGHT EVENTS:  58M PMH HTN, HLD,, melanoma x 3, last resection 1 year ago, well-known to our service with h/o admission 12/2023 s/p R craniotomy for tumor debulking 12/29/23 with Dr. Deleon with pathology consistent with glioblastoma, started on temozolomide for chemotherapy and recently started radiation therapy on 2/5, presented to [facility] 2/11/24 for worsening headache that started on 2/9, nausea and vomiting that started on date of presentation, CTH with concern for progression of known mass, tx to [facility]. Now s/p R craniotomy for tumor resection 2/13 POD#2. Patient seen this AM, c/o severe incisional pain. Given Ofirmev with improvement, flexeril also added standing    Vital Signs Last 24 Hrs  T(C): 36.7 (15 Feb 2024 08:08), Max: 37.1 (14 Feb 2024 15:09)  T(F): 98 (15 Feb 2024 08:08), Max: 98.8 (14 Feb 2024 15:09)  HR: 66 (15 Feb 2024 08:08) (64 - 90)  BP: 161/99 (15 Feb 2024 08:08) (127/86 - 161/99)  BP(mean): 106 (14 Feb 2024 14:42) (106 - 106)  RR: 18 (15 Feb 2024 08:08) (17 - 21)  SpO2: 96% (15 Feb 2024 08:08) (94% - 97%)    Parameters below as of 15 Feb 2024 08:08  Patient On (Oxygen Delivery Method): room air    PHYSICAL EXAM:  GENERAL: NAD, well-groomed  HEAD: S/p right craniotomy, incision c/d/i  CECILY COMA SCORE: E-4 V-5 M-6 =15  MENTAL STATUS: AAO x3; Appropriately conversant without aphasia; following commands  CRANIAL NERVES: PERRL. EOMI without nystagmus. Facial sensation intact V1-3 distribution b/l. Mild L facial droop. Hearing grossly intact. Speech clear  MOTOR: LUE/LLE 4+/5, RUE/RLE 5/5. No drift today  SENSATION: grossly intact to light touch all extremities  ABDOMEN: Soft  EXTREMITIES: Nontender to palpation    LABS:                        14.8   14.43 )-----------( 217      ( 15 Feb 2024 05:36 )             42.2     02-15    138  |  100  |  10.6  ----------------------------<  126<H>  4.8   |  26.0  |  0.76    Ca    8.8      15 Feb 2024 05:36  Phos  3.3     02-15  Mg     2.1     02-15    Urinalysis Basic - ( 15 Feb 2024 05:36 )    Color: x / Appearance: x / SG: x / pH: x  Gluc: 126 mg/dL / Ketone: x  / Bili: x / Urobili: x   Blood: x / Protein: x / Nitrite: x   Leuk Esterase: x / RBC: x / WBC x   Sq Epi: x / Non Sq Epi: x / Bacteria: x    02-14 @ 07:01  -  02-15 @ 07:00  --------------------------------------------------------  IN: 547 mL / OUT: 800 mL / NET: -253 mL    RADIOLOGY & ADDITIONAL TESTS:  CT Head No Cont (02.15.24 @ 12:15)  IMPRESSION: The patient is status post right frontal temporal craniotomy   and tumor debulking of a right frontal lobe mass.  Small focus of   hemorrhage in the right basal ganglia. Mild hemorrhage layering within   the inferior medial aspect of the surgical cavity.

## 2024-02-22 ENCOUNTER — APPOINTMENT (OUTPATIENT)
Dept: NEUROLOGY | Facility: CLINIC | Age: 59
End: 2024-02-22
Payer: COMMERCIAL

## 2024-02-22 ENCOUNTER — APPOINTMENT (OUTPATIENT)
Dept: HEMATOLOGY ONCOLOGY | Facility: CLINIC | Age: 59
End: 2024-02-22

## 2024-02-22 ENCOUNTER — RESULT REVIEW (OUTPATIENT)
Age: 59
End: 2024-02-22

## 2024-02-22 VITALS
WEIGHT: 192.24 LBS | OXYGEN SATURATION: 94 % | SYSTOLIC BLOOD PRESSURE: 141 MMHG | TEMPERATURE: 98.2 F | BODY MASS INDEX: 27.52 KG/M2 | HEIGHT: 70 IN | HEART RATE: 85 BPM | DIASTOLIC BLOOD PRESSURE: 90 MMHG

## 2024-02-22 LAB
BASOPHILS # BLD AUTO: 0.3 K/UL — HIGH (ref 0–0.2)
BASOPHILS NFR BLD AUTO: 2 % — SIGNIFICANT CHANGE UP (ref 0–2)
EOSINOPHIL # BLD AUTO: 0 K/UL — SIGNIFICANT CHANGE UP (ref 0–0.5)
EOSINOPHIL NFR BLD AUTO: 0 % — SIGNIFICANT CHANGE UP (ref 0–6)
HCT VFR BLD CALC: 44.7 % — SIGNIFICANT CHANGE UP (ref 39–50)
HGB BLD-MCNC: 16.8 G/DL — SIGNIFICANT CHANGE UP (ref 13–17)
LYMPHOCYTES # BLD AUTO: 1.3 K/UL — SIGNIFICANT CHANGE UP (ref 1–3.3)
LYMPHOCYTES # BLD AUTO: 8.2 % — LOW (ref 13–44)
MCHC RBC-ENTMCNC: 31.9 PG — SIGNIFICANT CHANGE UP (ref 27–34)
MCHC RBC-ENTMCNC: 37.6 G/DL — HIGH (ref 32–36)
MCV RBC AUTO: 84.7 FL — SIGNIFICANT CHANGE UP (ref 80–100)
MONOCYTES # BLD AUTO: 1 K/UL — HIGH (ref 0–0.9)
MONOCYTES NFR BLD AUTO: 5.8 % — SIGNIFICANT CHANGE UP (ref 2–14)
NEUTROPHILS # BLD AUTO: 13.7 K/UL — HIGH (ref 1.8–7.4)
NEUTROPHILS NFR BLD AUTO: 83.9 % — HIGH (ref 43–77)
PLATELET # BLD AUTO: 391 K/UL — SIGNIFICANT CHANGE UP (ref 150–400)
RBC # BLD: 5.28 M/UL — SIGNIFICANT CHANGE UP (ref 4.2–5.8)
RBC # FLD: 11.6 % — SIGNIFICANT CHANGE UP (ref 10.3–14.5)
SURGICAL PATHOLOGY STUDY: SIGNIFICANT CHANGE UP
WBC # BLD: 16.3 K/UL — HIGH (ref 3.8–10.5)
WBC # FLD AUTO: 16.3 K/UL — HIGH (ref 3.8–10.5)

## 2024-02-22 PROCEDURE — 99215 OFFICE O/P EST HI 40 MIN: CPT

## 2024-02-22 NOTE — DISCUSSION/SUMMARY
[FreeTextEntry1] : Newly diagnosed glioblastoma, MGMT methylated.  ChemoRT interrupted by early POD, s/p resection.  We discussed resuming RT as soon as safe.   Will resume chemo once RT date set Decadron taper outlined, hold at 4 mg daily Keppra 1000 bid for now  PT prescribed RTC 2w   Diarrhea Leucocytosis

## 2024-02-22 NOTE — PHYSICAL EXAM
[FreeTextEntry1] :  Exam as below (with documented exceptions in parentheses):  General:  ill appearing man KPS is 70  Mental Status:  Awake, alert and attentive. Oriented to person, place and time. Recent and remote memory intact. Normal concentration. Fluent spontaneous speech with intact naming and repetition. Normal fund of knowledge.    Cranial Nerves: II: Full visual fields. III,IV,VI:Pupils round, reactive to light. Full extraocular movements. No nystagmus. V: Normal bilateral bite, facial sensation. VII: No facial weakness. VIII: Hearing intact. IX,X: Palate midline, intact gag. XI:  Sternocleidomastoids normal. XII: Tongue protrudes midline. No dysarthria.   Motor:  Normal tone, bulk and power throughout including arms and legs, proximal and distal. No pronator drift. No abnormal movements.   Sensation: Normal in arms, legs and trunk to pin, proprioception and vibration. Negative Romberg.   Coordination: No dysmetria or dysdiadochokinesis bilaterally. Normal heel-shin testing.   Gait: Normal including heel and toe walking. Normal station.  (walks with assistance)  Reflexes: Normoactive and symmetric throughout. Absent Babinski bilaterally.   Vascular: No peripheral edema/calf tenderness.   Eyes: Funduscopic exam without papilledema (deferred)  Heart: Regular rate and rhythm. Normal s1-s2. No murmurs, rubs or gallops.   Respiratory: Lungs clear to auscultation bilaterally.   Abdomen: Nontender, non-distended. No hepatosplenomegaly. Normal bowel sounds in four quadrants.

## 2024-02-22 NOTE — HISTORY OF PRESENT ILLNESS
[FreeTextEntry1] : NEURO-ONCOLOGY  This 58 year old right handed man is seen at the request of Dr. Deleon for my advice and opinion regarding glioblastoma  He presented in 12/23 with headaches and memory loss, with confusion.  He developed left facial weakness.  He was found to have a right frontal and insular cystic and enhancing lesion. CTC AP was unrevealing.  Tumor was subtotally resected by Dr. Deleon on 12/29/23, revealing glioblastoma, IDH wt, MGMT methylated, Caris with EGFR amplification, TERT, RONA and MDM2 mutations, TMB 2. Following surgery he's improved, albeit with residual memory deficit and fatigue, as well as headaches.  He tapered steroids off.  He met with Dr. Amanda fowler.   Notably, he lives in [place] but as his family is here, he will remain here for treatment.   INTERVAL HISTORY:  Started chemoRT but then had worsened headache and vomiting, admitted with marked progression of right frontal disease, s/p surgery on 2/13/24. He tolerated this well and has improved.   PMH:  He had a number of cutaneous melanomas removed, but without systemic therapy or invasion, last of these was 12 mo ago.  PSH: as above. orthopedic surgery on shoulder. 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 Here with son Fabiano (a podiatist) not here is ex wife Tammi.  Nonsmoker. nondrinker FHX:  no brain tumors

## 2024-02-22 NOTE — DATA REVIEWED
[de-identified] : Reviewed pre and post operative scans from 12/23 showing subtotal resection of cystic enhancing right frontal and insular lesion, there are satellite foci of enhancement that remain, markedly worse on 2/12/24 MRI with 2/13/24 MRI showing extensive resection of right frontal enhancement

## 2024-02-23 NOTE — PHYSICAL EXAM
[FreeTextEntry1] : AOx3 PERRL EOMI slight left facial droop (baseline) Moving all extremities well Incision well healed, scabbing and mild erythema in center of incision; half of staples removed at bedside. Impaired gait

## 2024-02-28 ENCOUNTER — APPOINTMENT (OUTPATIENT)
Dept: HEMATOLOGY ONCOLOGY | Facility: CLINIC | Age: 59
End: 2024-02-28

## 2024-02-28 ENCOUNTER — APPOINTMENT (OUTPATIENT)
Dept: NEUROSURGERY | Facility: CLINIC | Age: 59
End: 2024-02-28

## 2024-02-28 ENCOUNTER — NON-APPOINTMENT (OUTPATIENT)
Age: 59
End: 2024-02-28

## 2024-02-28 VITALS
SYSTOLIC BLOOD PRESSURE: 150 MMHG | TEMPERATURE: 97 F | RESPIRATION RATE: 16 BRPM | HEART RATE: 97 BPM | OXYGEN SATURATION: 99 % | BODY MASS INDEX: 27.49 KG/M2 | WEIGHT: 192 LBS | HEIGHT: 70 IN | DIASTOLIC BLOOD PRESSURE: 98 MMHG

## 2024-02-28 NOTE — PHYSICAL EXAM
Received fax from pharmacy requesting refill on pts medication(s). Pt was last seen in office on 9/9/2022  and has a follow up scheduled for 11/29/2022. Will send request to  [facility]  for authorization.      Requested Prescriptions     Pending Prescriptions Disp Refills    tiZANidine (ZANAFLEX) 4 MG tablet [Pharmacy Med Name: tizanidine 4 mg tablet] 90 tablet 3     Sig: Take 1 tablet by mouth every 8 hours as needed (as needed for muscle spasms) [de-identified] : staples in place frontal scalp

## 2024-02-28 NOTE — DISEASE MANAGEMENT
[FreeTextEntry4] : high-grade glioma (astrocytoma, WHO IV, IDH wt) [NTNM] : - [TTNM] : - [MTNM] : - [de-identified] : 1000 before tx [de-identified] : 5212 [de-identified] : partial brain

## 2024-02-28 NOTE — VITALS
[Maximal Pain Intensity: 1/10] : 1/10 [Pain Interferes with ADLs] : Pain does not interfere with activities of daily living

## 2024-02-28 NOTE — REVIEW OF SYSTEMS
[Nausea: Grade 0] : Nausea: Grade 0 [Vomiting: Grade 0] : Vomiting: Grade 0 [Mucositis Oral: Grade 0] : Mucositis Oral: Grade 0  [Dizziness: Grade 1 - Mild unsteadiness or sensation of movement] : Dizziness: Grade 1 - Mild unsteadiness or sensation of movement [Alopecia: Grade 1 - Hair loss of <50% of normal for that individual that is not obvious from a distance but only on close inspection; a different hair style may be required to cover the hair loss but it does not require a wig or hair piece to camouflage] : Alopecia: Grade 1 - Hair loss of <50% of normal for that individual that is not obvious from a distance but only on close inspection; a different hair style may be required to cover the hair loss but it does not require a wig or hair piece to camouflage

## 2024-02-29 ENCOUNTER — OUTPATIENT (OUTPATIENT)
Dept: OUTPATIENT SERVICES | Facility: HOSPITAL | Age: 59
LOS: 1 days | End: 2024-02-29
Payer: COMMERCIAL

## 2024-02-29 ENCOUNTER — APPOINTMENT (OUTPATIENT)
Dept: CT IMAGING | Facility: CLINIC | Age: 59
End: 2024-02-29
Payer: COMMERCIAL

## 2024-02-29 DIAGNOSIS — C71.9 MALIGNANT NEOPLASM OF BRAIN, UNSPECIFIED: ICD-10-CM

## 2024-02-29 DIAGNOSIS — Z98.890 OTHER SPECIFIED POSTPROCEDURAL STATES: Chronic | ICD-10-CM

## 2024-02-29 PROCEDURE — 70450 CT HEAD/BRAIN W/O DYE: CPT | Mod: 26

## 2024-02-29 PROCEDURE — G6002: CPT | Mod: 26

## 2024-02-29 PROCEDURE — 70450 CT HEAD/BRAIN W/O DYE: CPT

## 2024-03-01 PROCEDURE — G6002: CPT | Mod: 26

## 2024-03-04 PROCEDURE — G6002: CPT | Mod: 26

## 2024-03-05 PROCEDURE — G6002: CPT | Mod: 26

## 2024-03-06 ENCOUNTER — APPOINTMENT (OUTPATIENT)
Dept: HEMATOLOGY ONCOLOGY | Facility: CLINIC | Age: 59
End: 2024-03-06

## 2024-03-06 PROCEDURE — 77427 RADIATION TX MANAGEMENT X5: CPT

## 2024-03-06 PROCEDURE — G6002: CPT | Mod: 26

## 2024-03-07 ENCOUNTER — NON-APPOINTMENT (OUTPATIENT)
Age: 59
End: 2024-03-07

## 2024-03-07 ENCOUNTER — APPOINTMENT (OUTPATIENT)
Dept: NEUROLOGY | Facility: CLINIC | Age: 59
End: 2024-03-07
Payer: COMMERCIAL

## 2024-03-07 ENCOUNTER — APPOINTMENT (OUTPATIENT)
Dept: NEUROSURGERY | Facility: CLINIC | Age: 59
End: 2024-03-07
Payer: COMMERCIAL

## 2024-03-07 VITALS
TEMPERATURE: 98.5 F | SYSTOLIC BLOOD PRESSURE: 135 MMHG | DIASTOLIC BLOOD PRESSURE: 94 MMHG | HEART RATE: 75 BPM | OXYGEN SATURATION: 95 % | BODY MASS INDEX: 28.2 KG/M2 | WEIGHT: 197 LBS | HEIGHT: 70 IN

## 2024-03-07 VITALS
SYSTOLIC BLOOD PRESSURE: 137 MMHG | BODY MASS INDEX: 27.92 KG/M2 | OXYGEN SATURATION: 99 % | WEIGHT: 195 LBS | HEIGHT: 70 IN | DIASTOLIC BLOOD PRESSURE: 94 MMHG | RESPIRATION RATE: 16 BRPM | HEART RATE: 84 BPM

## 2024-03-07 VITALS
HEIGHT: 70 IN | HEART RATE: 76 BPM | WEIGHT: 197 LBS | DIASTOLIC BLOOD PRESSURE: 94 MMHG | TEMPERATURE: 97.1 F | BODY MASS INDEX: 28.2 KG/M2 | SYSTOLIC BLOOD PRESSURE: 138 MMHG | OXYGEN SATURATION: 95 %

## 2024-03-07 PROCEDURE — 99024 POSTOP FOLLOW-UP VISIT: CPT

## 2024-03-07 PROCEDURE — 99214 OFFICE O/P EST MOD 30 MIN: CPT

## 2024-03-07 PROCEDURE — 77014: CPT | Mod: 26

## 2024-03-07 RX ORDER — SENNOSIDES 8.6 MG TABLETS 8.6 MG/1
8.6 TABLET ORAL
Qty: 60 | Refills: 0 | Status: ACTIVE | COMMUNITY
Start: 2024-03-07 | End: 1900-01-01

## 2024-03-07 NOTE — PHYSICAL EXAM
[FreeTextEntry1] : AOX3 PERRL EOMI Baseline left facial weakness around lip No pronator drift 5/5 strength throughout Incision well healed Staples removed at bedside

## 2024-03-07 NOTE — DATA REVIEWED
[de-identified] : Reviewed pre and post operative scans from 12/23 showing subtotal resection of cystic enhancing right frontal and insular lesion, there are satellite foci of enhancement that remain, markedly worse on 2/12/24 MRI with 2/13/24 MRI showing extensive resection of right frontal enhancement   No progressive tumor or edema on 2/27 CT head

## 2024-03-07 NOTE — PHYSICAL EXAM
[FreeTextEntry1] : Exam as below (with documented exceptions in parentheses):  General:  well appearing man KPS is 70  Mental Status:  Awake, alert and attentive. Oriented to person, place and time. Recent and remote memory intact. Normal concentration. Fluent spontaneous speech with intact naming and repetition. Normal fund of knowledge.    Cranial Nerves: II: Full visual fields. III,IV,VI:Pupils round, reactive to light. Full extraocular movements. No nystagmus. V: Normal bilateral bite, facial sensation. VII: No facial weakness. VIII: Hearing intact. IX,X: Palate midline, intact gag. XI:  Sternocleidomastoids normal. XII: Tongue protrudes midline. No dysarthria.   Motor:  Normal tone, bulk and power throughout including arms and legs, proximal and distal. No pronator drift. No abnormal movements.   Sensation: Normal in arms, legs and trunk to pin, proprioception and vibration. Negative Romberg.   Coordination: No dysmetria or dysdiadochokinesis bilaterally. Normal heel-shin testing.   Gait: Normal including heel and toe walking. Normal station.  (walks with assistance)  Reflexes: Normoactive and symmetric throughout. Absent Babinski bilaterally.   Vascular: No peripheral edema/calf tenderness.   Eyes: Funduscopic exam without papilledema (deferred)  Heart: Regular rate and rhythm. Normal s1-s2. No murmurs, rubs or gallops.   Respiratory: Lungs clear to auscultation bilaterally.   Abdomen: Nontender, non-distended. No hepatosplenomegaly. Normal bowel sounds in four quadrants.

## 2024-03-07 NOTE — ASSESSMENT
[FreeTextEntry1] : Mr. Fabiano Kovacs is a very pleasant 58-year-old male with a history of hypertension, hyperlipidemia, skin melanoma resected 3 times previously (last about 1 year ago) who presented with several months of altered mental status, repetitive speech, blurry vision, worsening memory, and headaches who underwent resection for a right frontotemporal GBM on 12/29/2023 and repeat resection on 2/13/2024.  He is overall doing well without any focal neurological deficits.  Staples removed at bedside.  I reviewed his head CT with him and his daughter which does not show an acute abnormality.  They know to call my office with any issues or concerns.  I would like to see him back in 1 month.  All questions answered.

## 2024-03-07 NOTE — REVIEW OF SYSTEMS
[Nausea: Grade 0] : Nausea: Grade 0 [Vomiting: Grade 0] : Vomiting: Grade 0 [Fatigue: Grade 1 - Fatigue relieved by rest] : Fatigue: Grade 1 - Fatigue relieved by rest [Edema Limbs: Grade 0] : Edema Limbs: Grade 0  [Localized Edema: Grade 0] : Localized Edema: Grade 0  [Neck Edema: Grade 0] : Neck Edema: Grade 0 [Blurred Vision: Grade 1 - Intervention not indicated] : Blurred Vision: Grade 1 - Intervention not indicated [Mucositis Oral: Grade 0] : Mucositis Oral: Grade 0  [Dizziness: Grade 1 - Mild unsteadiness or sensation of movement] : Dizziness: Grade 1 - Mild unsteadiness or sensation of movement [Dysgeusia: Grade 0] : Dysgeusia: Grade 0 [Headache: Grade 1 - Mild pain] : Headache: Grade 1 - Mild pain [Alopecia: Grade 1 - Hair loss of <50% of normal for that individual that is not obvious from a distance but only on close inspection; a different hair style may be required to cover the hair loss but it does not require a wig or hair piece to camouflage] : Alopecia: Grade 1 - Hair loss of <50% of normal for that individual that is not obvious from a distance but only on close inspection; a different hair style may be required to cover the hair loss but it does not require a wig or hair piece to camouflage [Skin Hyperpigmentation: Grade 0] : Skin Hyperpigmentation: Grade 0 [Nausea: Grade 1 - Loss of appetite without alteration in eating habits] : Nausea: Grade 1 - Loss of appetite without alteration in eating habits

## 2024-03-07 NOTE — VITALS
[Maximal Pain Intensity: 1/10] : 1/10 [Least Pain Intensity: 0/10] : 0/10 [Pain Description/Quality: ___] : Pain description/quality: [unfilled] [Pain Duration: ___] : Pain duration: [unfilled] [Pain Location: ___] : Pain Location: [unfilled] [OTC] : OTC [80: Normal activity with effort; some signs or symptoms of disease.] : 80: Normal activity with effort; some signs or symptoms of disease.  [Pain Interferes with ADLs] : Pain does not interfere with activities of daily living [70: Cares for self; unalbe to carry on normal activity or do active work.] : 70: Cares for self; unable to carry on normal activity or do active work.

## 2024-03-07 NOTE — HISTORY OF PRESENT ILLNESS
[FreeTextEntry1] : Mr. Fabiano Kovacs is a very pleasant 58-year-old male with a history of hypertension, hyperlipidemia, skin melanoma resected 3 times previously (last about 1 year ago) who presented with several months of altered mental status, repetitive speech, blurry vision, worsening memory, and headaches found to have a right frontal contrast-enhancing lesion and underwent resection on December 29, 2023 with regrowth of tumor requiring repeat resection on February 13, 2024.  He started chemoradiation last week.  He remains on steroids and Keppra managed by Dr. Calix.  He woke up today with a headache and took some pain medications, but prior to today was having very minimal headaches. He denies any issues with his incision. He underwent a head CT on 2/29/2024 due to his PCP having concerns regarding his pupils.

## 2024-03-07 NOTE — HISTORY OF PRESENT ILLNESS
[FreeTextEntry1] : NEURO-ONCOLOGY  This 58 year old right handed man is in follow up regarding glioblastoma  He presented in 12/23 with headaches and memory loss, with confusion.  He developed left facial weakness.  He was found to have a right frontal and insular cystic and enhancing lesion. CTC AP was unrevealing.  Tumor was subtotally resected by Dr. Deleon on 12/29/23, revealing glioblastoma, IDH wt, MGMT methylated, Caris with EGFR amplification, TERT, RONA and MDM2 mutations, TMB 2.  He started chemoRT, but was admitted with massive progression and had resection of right frontal tumor on 2/14/24.   INTERVAL HISTORY:  Restarted chemoRT well tolerated thus far.  Had a CT with contrast ordered by his PCP, showing no new edema or progressive tumor.  Clinically well, without new neuro complaint.     PMH:  He had a number of cutaneous melanomas removed, but without systemic therapy or invasion, last of these was 12 mo ago.  PSH: as above. orthopedic surgery on shoulder. SHX:  Here with daughter Erica, not present is son Fabiano (a podiatrist) and ex wife Tammi. 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 Nonsmoker. nondrinker FHX:  no brain tumors

## 2024-03-07 NOTE — PHYSICAL EXAM
[Normal] : oriented to person, place and time, the affect was normal, the mood was normal and not anxious [Extraocular Movements] : extraocular movements were intact [de-identified] : staples in place frontal scalp; no erythema [Outer Ear] : the ears and nose were normal in appearance

## 2024-03-07 NOTE — DISCUSSION/SUMMARY
[FreeTextEntry1] : Newly diagnosed glioblastoma, MGMT methylated.  ChemoRT interrupted by early POD, s/p resection.  Has resumed chemoRT.     Continue dex 4 for now, will taper a bit next visit Keppra 1000 bid for now, taper in future Continue daily TMZ with RT Weekly CBC encourgaed PT prescribed RTC 2w

## 2024-03-07 NOTE — DISEASE MANAGEMENT
[Clinical] : TNM Stage: c [N/A] : Currently not applicable [FreeTextEntry4] : high-grade glioma (astrocytoma, WHO IV, IDH wt) [TTNM] : - [MTNM] : - [NTNM] : - [de-identified] : 2200 before tx [de-identified] : 9318 [de-identified] : partial brain

## 2024-03-08 ENCOUNTER — APPOINTMENT (OUTPATIENT)
Dept: HEMATOLOGY ONCOLOGY | Facility: CLINIC | Age: 59
End: 2024-03-08

## 2024-03-08 ENCOUNTER — RESULT REVIEW (OUTPATIENT)
Age: 59
End: 2024-03-08

## 2024-03-08 LAB
BASOPHILS # BLD AUTO: 0.1 K/UL — SIGNIFICANT CHANGE UP (ref 0–0.2)
BASOPHILS NFR BLD AUTO: 0.7 % — SIGNIFICANT CHANGE UP (ref 0–2)
EOSINOPHIL # BLD AUTO: 0 K/UL — SIGNIFICANT CHANGE UP (ref 0–0.5)
EOSINOPHIL NFR BLD AUTO: 0.4 % — SIGNIFICANT CHANGE UP (ref 0–6)
HCT VFR BLD CALC: 44.4 % — SIGNIFICANT CHANGE UP (ref 39–50)
HGB BLD-MCNC: 15.9 G/DL — SIGNIFICANT CHANGE UP (ref 13–17)
LYMPHOCYTES # BLD AUTO: 1.5 K/UL — SIGNIFICANT CHANGE UP (ref 1–3.3)
LYMPHOCYTES # BLD AUTO: 12.8 % — LOW (ref 13–44)
MCHC RBC-ENTMCNC: 31.7 PG — SIGNIFICANT CHANGE UP (ref 27–34)
MCHC RBC-ENTMCNC: 35.8 G/DL — SIGNIFICANT CHANGE UP (ref 32–36)
MCV RBC AUTO: 88.6 FL — SIGNIFICANT CHANGE UP (ref 80–100)
MONOCYTES # BLD AUTO: 0.8 K/UL — SIGNIFICANT CHANGE UP (ref 0–0.9)
MONOCYTES NFR BLD AUTO: 6.3 % — SIGNIFICANT CHANGE UP (ref 2–14)
NEUTROPHILS # BLD AUTO: 9.7 K/UL — HIGH (ref 1.8–7.4)
NEUTROPHILS NFR BLD AUTO: 79.9 % — HIGH (ref 43–77)
PLATELET # BLD AUTO: 237 K/UL — SIGNIFICANT CHANGE UP (ref 150–400)
RBC # BLD: 5.02 M/UL — SIGNIFICANT CHANGE UP (ref 4.2–5.8)
RBC # FLD: 12.1 % — SIGNIFICANT CHANGE UP (ref 10.3–14.5)
WBC # BLD: 12.1 K/UL — HIGH (ref 3.8–10.5)
WBC # FLD AUTO: 12.1 K/UL — HIGH (ref 3.8–10.5)

## 2024-03-08 PROCEDURE — G6002: CPT | Mod: 26

## 2024-03-11 PROCEDURE — G6002: CPT | Mod: 26

## 2024-03-12 PROCEDURE — G6002: CPT | Mod: 26

## 2024-03-13 ENCOUNTER — APPOINTMENT (OUTPATIENT)
Dept: HEMATOLOGY ONCOLOGY | Facility: CLINIC | Age: 59
End: 2024-03-13

## 2024-03-13 PROCEDURE — G6002: CPT | Mod: 26

## 2024-03-13 PROCEDURE — 77427 RADIATION TX MANAGEMENT X5: CPT

## 2024-03-14 ENCOUNTER — RESULT REVIEW (OUTPATIENT)
Age: 59
End: 2024-03-14

## 2024-03-14 ENCOUNTER — NON-APPOINTMENT (OUTPATIENT)
Age: 59
End: 2024-03-14

## 2024-03-14 ENCOUNTER — APPOINTMENT (OUTPATIENT)
Dept: HEMATOLOGY ONCOLOGY | Facility: CLINIC | Age: 59
End: 2024-03-14

## 2024-03-14 VITALS
SYSTOLIC BLOOD PRESSURE: 163 MMHG | BODY MASS INDEX: 28.06 KG/M2 | WEIGHT: 196 LBS | OXYGEN SATURATION: 96 % | HEART RATE: 97 BPM | RESPIRATION RATE: 16 BRPM | DIASTOLIC BLOOD PRESSURE: 98 MMHG | HEIGHT: 70 IN

## 2024-03-14 LAB
BASOPHILS # BLD AUTO: 0.1 K/UL — SIGNIFICANT CHANGE UP (ref 0–0.2)
BASOPHILS NFR BLD AUTO: 1.2 % — SIGNIFICANT CHANGE UP (ref 0–2)
EOSINOPHIL # BLD AUTO: 0.1 K/UL — SIGNIFICANT CHANGE UP (ref 0–0.5)
EOSINOPHIL NFR BLD AUTO: 1 % — SIGNIFICANT CHANGE UP (ref 0–6)
HCT VFR BLD CALC: 42.6 % — SIGNIFICANT CHANGE UP (ref 39–50)
HGB BLD-MCNC: 15.7 G/DL — SIGNIFICANT CHANGE UP (ref 13–17)
LYMPHOCYTES # BLD AUTO: 1.5 K/UL — SIGNIFICANT CHANGE UP (ref 1–3.3)
LYMPHOCYTES # BLD AUTO: 14 % — SIGNIFICANT CHANGE UP (ref 13–44)
MACROCYTES BLD QL: SLIGHT — SIGNIFICANT CHANGE UP
MCHC RBC-ENTMCNC: 32.9 PG — SIGNIFICANT CHANGE UP (ref 27–34)
MCHC RBC-ENTMCNC: 36.9 G/DL — HIGH (ref 32–36)
MCV RBC AUTO: 89.1 FL — SIGNIFICANT CHANGE UP (ref 80–100)
MONOCYTES # BLD AUTO: 0.7 K/UL — SIGNIFICANT CHANGE UP (ref 0–0.9)
MONOCYTES NFR BLD AUTO: 3 % — SIGNIFICANT CHANGE UP (ref 2–14)
NEUTROPHILS # BLD AUTO: 7.7 K/UL — HIGH (ref 1.8–7.4)
NEUTROPHILS NFR BLD AUTO: 81 % — HIGH (ref 43–77)
PLAT MORPH BLD: NORMAL — SIGNIFICANT CHANGE UP
PLATELET # BLD AUTO: 224 K/UL — SIGNIFICANT CHANGE UP (ref 150–400)
RBC # BLD: 4.78 M/UL — SIGNIFICANT CHANGE UP (ref 4.2–5.8)
RBC # FLD: 12.4 % — SIGNIFICANT CHANGE UP (ref 10.3–14.5)
RBC BLD AUTO: SIGNIFICANT CHANGE UP
VARIANT LYMPHS # BLD: 1 % — SIGNIFICANT CHANGE UP (ref 0–6)
WBC # BLD: 10.1 K/UL — SIGNIFICANT CHANGE UP (ref 3.8–10.5)
WBC # FLD AUTO: 10.1 K/UL — SIGNIFICANT CHANGE UP (ref 3.8–10.5)

## 2024-03-14 PROCEDURE — 77014: CPT | Mod: 26

## 2024-03-14 NOTE — PHYSICAL EXAM
[Extraocular Movements] : extraocular movements were intact [Outer Ear] : the ears and nose were normal in appearance [Normal] : normal heart rate and rhythm, normal S1 and S2, and no murmurs present [de-identified] : staples removed; minimal erythema of the scalp

## 2024-03-14 NOTE — REVIEW OF SYSTEMS
[Nausea: Grade 1 - Loss of appetite without alteration in eating habits] : Nausea: Grade 1 - Loss of appetite without alteration in eating habits [Vomiting: Grade 0] : Vomiting: Grade 0 [Edema Limbs: Grade 0] : Edema Limbs: Grade 0  [Fatigue: Grade 1 - Fatigue relieved by rest] : Fatigue: Grade 1 - Fatigue relieved by rest [Localized Edema: Grade 0] : Localized Edema: Grade 0  [Neck Edema: Grade 0] : Neck Edema: Grade 0 [Mucositis Oral: Grade 0] : Mucositis Oral: Grade 0  [Blurred Vision: Grade 1 - Intervention not indicated] : Blurred Vision: Grade 1 - Intervention not indicated [Dizziness: Grade 1 - Mild unsteadiness or sensation of movement] : Dizziness: Grade 1 - Mild unsteadiness or sensation of movement [Dysgeusia: Grade 0] : Dysgeusia: Grade 0 [Headache: Grade 1 - Mild pain] : Headache: Grade 1 - Mild pain [Skin Hyperpigmentation: Grade 0] : Skin Hyperpigmentation: Grade 0 [Alopecia: Grade 1 - Hair loss of <50% of normal for that individual that is not obvious from a distance but only on close inspection; a different hair style may be required to cover the hair loss but it does not require a wig or hair piece to camouflage] : Alopecia: Grade 1 - Hair loss of <50% of normal for that individual that is not obvious from a distance but only on close inspection; a different hair style may be required to cover the hair loss but it does not require a wig or hair piece to camouflage [Nausea: Grade 0] : Nausea: Grade 0 [Headache: Grade 0] : Headache: Grade 0

## 2024-03-14 NOTE — VITALS
[Maximal Pain Intensity: 1/10] : 1/10 [Pain Description/Quality: ___] : Pain description/quality: [unfilled] [Least Pain Intensity: 0/10] : 0/10 [Pain Duration: ___] : Pain duration: [unfilled] [Pain Location: ___] : Pain Location: [unfilled] [OTC] : OTC [Pain Interferes with ADLs] : Pain does not interfere with activities of daily living [70: Cares for self; unalbe to carry on normal activity or do active work.] : 70: Cares for self; unable to carry on normal activity or do active work. [80: Normal activity with effort; some signs or symptoms of disease.] : 80: Normal activity with effort; some signs or symptoms of disease.

## 2024-03-14 NOTE — DISEASE MANAGEMENT
[Clinical] : TNM Stage: c [N/A] : Currently not applicable [TTNM] : - [FreeTextEntry4] : high-grade glioma (astrocytoma, WHO IV, IDH wt) [MTNM] : - [NTNM] : - [de-identified] : 3200 before tx [de-identified] : 9052 [de-identified] : partial brain

## 2024-03-15 PROCEDURE — G6002: CPT | Mod: 26

## 2024-03-18 PROCEDURE — G6002: CPT | Mod: 26

## 2024-03-19 ENCOUNTER — NON-APPOINTMENT (OUTPATIENT)
Age: 59
End: 2024-03-19

## 2024-03-19 ENCOUNTER — EMERGENCY (EMERGENCY)
Facility: HOSPITAL | Age: 59
LOS: 1 days | Discharge: HOME | End: 2024-03-19
Attending: EMERGENCY MEDICINE
Payer: COMMERCIAL

## 2024-03-19 VITALS
RESPIRATION RATE: 17 BRPM | TEMPERATURE: 98 F | DIASTOLIC BLOOD PRESSURE: 103 MMHG | HEART RATE: 77 BPM | HEIGHT: 70 IN | OXYGEN SATURATION: 98 % | WEIGHT: 163.8 LBS | SYSTOLIC BLOOD PRESSURE: 145 MMHG

## 2024-03-19 VITALS
HEART RATE: 76 BPM | OXYGEN SATURATION: 97 % | DIASTOLIC BLOOD PRESSURE: 82 MMHG | SYSTOLIC BLOOD PRESSURE: 140 MMHG | RESPIRATION RATE: 18 BRPM

## 2024-03-19 DIAGNOSIS — Z98.890 OTHER SPECIFIED POSTPROCEDURAL STATES: Chronic | ICD-10-CM

## 2024-03-19 LAB
ALBUMIN SERPL ELPH-MCNC: 4 G/DL — SIGNIFICANT CHANGE UP (ref 3.3–5.2)
ALP SERPL-CCNC: 99 U/L — SIGNIFICANT CHANGE UP (ref 40–120)
ALT FLD-CCNC: 79 U/L — HIGH
ANION GAP SERPL CALC-SCNC: 13 MMOL/L — SIGNIFICANT CHANGE UP (ref 5–17)
APTT BLD: 27.1 SEC — SIGNIFICANT CHANGE UP (ref 24.5–35.6)
AST SERPL-CCNC: 33 U/L — SIGNIFICANT CHANGE UP
BASOPHILS # BLD AUTO: 0.1 K/UL — SIGNIFICANT CHANGE UP (ref 0–0.2)
BASOPHILS NFR BLD AUTO: 0.9 % — SIGNIFICANT CHANGE UP (ref 0–2)
BILIRUB SERPL-MCNC: 0.6 MG/DL — SIGNIFICANT CHANGE UP (ref 0.4–2)
BLD GP AB SCN SERPL QL: SIGNIFICANT CHANGE UP
BUN SERPL-MCNC: 11.8 MG/DL — SIGNIFICANT CHANGE UP (ref 8–20)
CALCIUM SERPL-MCNC: 9.2 MG/DL — SIGNIFICANT CHANGE UP (ref 8.4–10.5)
CHLORIDE SERPL-SCNC: 98 MMOL/L — SIGNIFICANT CHANGE UP (ref 96–108)
CO2 SERPL-SCNC: 28 MMOL/L — SIGNIFICANT CHANGE UP (ref 22–29)
CREAT SERPL-MCNC: 0.67 MG/DL — SIGNIFICANT CHANGE UP (ref 0.5–1.3)
EGFR: 108 ML/MIN/1.73M2 — SIGNIFICANT CHANGE UP
EOSINOPHIL # BLD AUTO: 0 K/UL — SIGNIFICANT CHANGE UP (ref 0–0.5)
EOSINOPHIL NFR BLD AUTO: 0 % — SIGNIFICANT CHANGE UP (ref 0–6)
GLUCOSE SERPL-MCNC: 98 MG/DL — SIGNIFICANT CHANGE UP (ref 70–99)
HCT VFR BLD CALC: 46.7 % — SIGNIFICANT CHANGE UP (ref 39–50)
HGB BLD-MCNC: 16.2 G/DL — SIGNIFICANT CHANGE UP (ref 13–17)
INR BLD: 0.88 RATIO — SIGNIFICANT CHANGE UP (ref 0.85–1.18)
LYMPHOCYTES # BLD AUTO: 0.47 K/UL — LOW (ref 1–3.3)
LYMPHOCYTES # BLD AUTO: 4.3 % — LOW (ref 13–44)
MANUAL SMEAR VERIFICATION: SIGNIFICANT CHANGE UP
MCHC RBC-ENTMCNC: 31.5 PG — SIGNIFICANT CHANGE UP (ref 27–34)
MCHC RBC-ENTMCNC: 34.7 GM/DL — SIGNIFICANT CHANGE UP (ref 32–36)
MCV RBC AUTO: 90.7 FL — SIGNIFICANT CHANGE UP (ref 80–100)
METAMYELOCYTES # FLD: 0.9 % — HIGH (ref 0–0)
MONOCYTES # BLD AUTO: 0.47 K/UL — SIGNIFICANT CHANGE UP (ref 0–0.9)
MONOCYTES NFR BLD AUTO: 4.3 % — SIGNIFICANT CHANGE UP (ref 2–14)
NEUTROPHILS # BLD AUTO: 9.3 K/UL — HIGH (ref 1.8–7.4)
NEUTROPHILS NFR BLD AUTO: 84.4 % — HIGH (ref 43–77)
PLAT MORPH BLD: NORMAL — SIGNIFICANT CHANGE UP
PLATELET # BLD AUTO: 282 K/UL — SIGNIFICANT CHANGE UP (ref 150–400)
POTASSIUM SERPL-MCNC: 4.8 MMOL/L — SIGNIFICANT CHANGE UP (ref 3.5–5.3)
POTASSIUM SERPL-SCNC: 4.8 MMOL/L — SIGNIFICANT CHANGE UP (ref 3.5–5.3)
PROT SERPL-MCNC: 6.7 G/DL — SIGNIFICANT CHANGE UP (ref 6.6–8.7)
PROTHROM AB SERPL-ACNC: 9.8 SEC — SIGNIFICANT CHANGE UP (ref 9.5–13)
RBC # BLD: 5.15 M/UL — SIGNIFICANT CHANGE UP (ref 4.2–5.8)
RBC # FLD: 14.6 % — HIGH (ref 10.3–14.5)
RBC BLD AUTO: NORMAL — SIGNIFICANT CHANGE UP
SODIUM SERPL-SCNC: 139 MMOL/L — SIGNIFICANT CHANGE UP (ref 135–145)
TROPONIN T, HIGH SENSITIVITY RESULT: 11 NG/L — SIGNIFICANT CHANGE UP (ref 0–51)
VARIANT LYMPHS # BLD: 5.2 % — SIGNIFICANT CHANGE UP (ref 0–6)
WBC # BLD: 11.02 K/UL — HIGH (ref 3.8–10.5)
WBC # FLD AUTO: 11.02 K/UL — HIGH (ref 3.8–10.5)

## 2024-03-19 PROCEDURE — 86901 BLOOD TYPING SEROLOGIC RH(D): CPT

## 2024-03-19 PROCEDURE — 85730 THROMBOPLASTIN TIME PARTIAL: CPT

## 2024-03-19 PROCEDURE — 85610 PROTHROMBIN TIME: CPT

## 2024-03-19 PROCEDURE — 86850 RBC ANTIBODY SCREEN: CPT

## 2024-03-19 PROCEDURE — 70496 CT ANGIOGRAPHY HEAD: CPT | Mod: 26,MC

## 2024-03-19 PROCEDURE — 70553 MRI BRAIN STEM W/O & W/DYE: CPT | Mod: MC

## 2024-03-19 PROCEDURE — 99291 CRITICAL CARE FIRST HOUR: CPT

## 2024-03-19 PROCEDURE — 70450 CT HEAD/BRAIN W/O DYE: CPT | Mod: MC

## 2024-03-19 PROCEDURE — 80053 COMPREHEN METABOLIC PANEL: CPT

## 2024-03-19 PROCEDURE — 85025 COMPLETE CBC W/AUTO DIFF WBC: CPT

## 2024-03-19 PROCEDURE — 70450 CT HEAD/BRAIN W/O DYE: CPT | Mod: 26,MC,59

## 2024-03-19 PROCEDURE — 86900 BLOOD TYPING SEROLOGIC ABO: CPT

## 2024-03-19 PROCEDURE — 84484 ASSAY OF TROPONIN QUANT: CPT

## 2024-03-19 PROCEDURE — 93010 ELECTROCARDIOGRAM REPORT: CPT

## 2024-03-19 PROCEDURE — 70553 MRI BRAIN STEM W/O & W/DYE: CPT | Mod: 26,MC

## 2024-03-19 PROCEDURE — 36415 COLL VENOUS BLD VENIPUNCTURE: CPT

## 2024-03-19 PROCEDURE — 70498 CT ANGIOGRAPHY NECK: CPT | Mod: MC

## 2024-03-19 PROCEDURE — 77280 THER RAD SIMULAJ FIELD SMPL: CPT | Mod: 26

## 2024-03-19 PROCEDURE — 0042T: CPT | Mod: MC

## 2024-03-19 PROCEDURE — 82962 GLUCOSE BLOOD TEST: CPT

## 2024-03-19 PROCEDURE — 93005 ELECTROCARDIOGRAM TRACING: CPT

## 2024-03-19 PROCEDURE — 70498 CT ANGIOGRAPHY NECK: CPT | Mod: 26,MC

## 2024-03-19 PROCEDURE — 99291 CRITICAL CARE FIRST HOUR: CPT | Mod: 25

## 2024-03-19 PROCEDURE — 70496 CT ANGIOGRAPHY HEAD: CPT | Mod: MC

## 2024-03-19 RX ORDER — ACETAMINOPHEN 500 MG
1000 TABLET ORAL ONCE
Refills: 0 | Status: COMPLETED | OUTPATIENT
Start: 2024-03-19 | End: 2024-03-19

## 2024-03-19 RX ORDER — METOCLOPRAMIDE HCL 10 MG
10 TABLET ORAL ONCE
Refills: 0 | Status: COMPLETED | OUTPATIENT
Start: 2024-03-19 | End: 2024-03-19

## 2024-03-19 NOTE — ED ADULT TRIAGE NOTE - CHIEF COMPLAINT QUOTE
sudden onset of gait disturbance, right sided headache, blurry vision & double vision onset 4am today. hx "tumor behind eye, radiation was this morning".

## 2024-03-19 NOTE — ED PROVIDER NOTE - PROGRESS NOTE DETAILS
Puma: I discussed with Neurosurgery who states that MRI looks the same, patient remains asymptomatic, he has appointment with NeuroOnc and for now to keep his medications as-is. Patient is cleared to go when MRI results.

## 2024-03-19 NOTE — ED PROVIDER NOTE - NSFOLLOWUPINSTRUCTIONS_ED_ALL_ED_FT
Continue your regular medications as prescribed.    Follow up with your Neuro Oncologist as scheduled.

## 2024-03-19 NOTE — ED PROVIDER NOTE - OBJECTIVE STATEMENT
58-year-old male with past medical history of GBM, hypertension, hyperlipidemia presents for headache that woke him from sleep around 4 AM with double vision (vertical) that has waxed and waned.  He took Tylenol with minimal relief of his headache.  He states he has had similar symptoms before related to regrowth of his tumor.  He is currently on chemo and radiation.  He had resection of regrowth of tumor on 2/14/2024.  He denies any numbness, weakness, nausea or vomiting.  He has been compliant with his Keppra, Decadron, and has not missed any chemo doses.    Nsx: Pancho

## 2024-03-19 NOTE — VITALS
[Maximal Pain Intensity: 10/10] : 10/10 [Least Pain Intensity: 5/10] : 5/10 [Pain Description/Quality: ___] : Pain description/quality: [unfilled] [Pain Duration: ___] : Pain duration: [unfilled] [Pain Location: ___] : Pain Location: [unfilled] [Pain Interferes with ADLs] : Pain interferes with activities of daily living.

## 2024-03-19 NOTE — CONSULT NOTE ADULT - SUBJECTIVE AND OBJECTIVE BOX
HISTORY OF PRESENT ILLNESS:   58 year old male with past medical history of HTN, HLD, melanoma x 3, last resection 1 year ago, well-known to our service with hx of admission 2023 s/p right craniotomy for tumor debulking 23 with Dr. Deleon with pathology consistent with glioblastoma, started on temozolomide for chemotherapy and started radiation therapy on . [place] [facility] [place] for worsening headache, N/V with concern of recurrent frontotemporal brain mass on CTH. Underwent redo R crani for tumor resection on 24. Patient presents today due to worsening headaches and pressure behind his eyes over the last 1-2 weeks. Woke up at 4AM this morning due to severe HA and also reported vertical double vision. Took Tylenol with some relief, reports HA is worse with movement. Has been compliant with Keppra and Decadron at home. Pt went to appt at [facility] today for radiation treatment and was told to come to ED due to similar symptoms in February associated with tumor regrowth. CTH done today demonstrates some R frontotemporal edema which may be related to tumor growth. Patient currently feels well, states headache/eye pressure has resolved.        PAST MEDICAL & SURGICAL HISTORY:  Melanoma  Hypertension  HLD (hyperlipidemia)  GBM (glioblastoma multiforme)  S/P rotator cuff repair  S/P craniotomy    FAMILY HISTORY:  No pertinent family history in first degree relatives    Allergies    No Known Allergies    Intolerances      REVIEW OF SYSTEMS  Negative except as noted in HPI      HOME MEDICATIONS:  Home Medications:  acetaminophen 325 mg oral tablet: 2 tab(s) orally every 6 hours As needed Temp greater or equal to 38C (100.4F), Mild Pain (1 - 3) (2024 10:12)  Lexapro 10 mg oral tablet: 1 tab(s) orally once a day (2024 09:16)  Zofran ODT 8 mg oral tablet, disintegratin tab(s) orally prn (2024 09:15)      MEDICATIONS:    Neuro:  acetaminophen   IVPB .. 1000 milliGRAM(s) IV Intermittent Once  metoclopramide Injectable 10 milliGRAM(s) IV Push once      Vital Signs Last 24 Hrs  T(C): 36.8 (19 Mar 2024 09:46), Max: 36.8 (19 Mar 2024 09:46)  T(F): 98.3 (19 Mar 2024 09:46), Max: 98.3 (19 Mar 2024 09:46)  HR: 77 (19 Mar 2024 09:46) (77 - 77)  BP: 145/103 (19 Mar 2024 09:46) (145/103 - 145/103)  BP(mean): --  RR: 17 (19 Mar 2024 09:46) (17 - 17)  SpO2: 98% (19 Mar 2024 09:46) (98% - 98%)    Parameters below as of 19 Mar 2024 09:46  Patient On (Oxygen Delivery Method): room air      PHYSICAL EXAM:  GENERAL: NAD, well-groomed  HEAD:  well-healed incision s/p R crani  EYES: Conjunctiva and sclera clear  CECILY COMA SCORE: E-4 V-5 M-6 = 15  MENTAL STATUS: AAO x3; Awake; Opens eyes spontaneously; Appropriately conversant without aphasia; Follows commands  CRANIAL NERVES:  Mild L facial (baseline). PERRL. EOMI without nystagmus. Facial sensation intact V1-3 distribution b/l. Tongue midline. Hearing grossly intact. Speech clear.   MOTOR: strength 5/5 b/l upper and lower extremities  SENSATION: grossly intact to light touch all extremities  COORDINATION: Gait testing deferred  CHEST/LUNG: Non-labored breathing on room air  EXTREMITIES: no clubbing, cyanosis, or edema  SKIN: Warm, dry    LABS:                        16.2   11.02 )-----------( 282      ( 19 Mar 2024 09:55 )             46.7     03-19    139  |  98  |  11.8  ----------------------------<  98  4.8   |  28.0  |  0.67    Ca    9.2      19 Mar 2024 09:55    TPro  6.7  /  Alb  4.0  /  TBili  0.6  /  DBili  x   /  AST  33  /  ALT  79<H>  /  AlkPhos  99  03-19    PT/INR - ( 19 Mar 2024 09:55 )   PT: 9.8 sec;   INR: 0.88 ratio         PTT - ( 19 Mar 2024 09:55 )  PTT:27.1 sec  Urinalysis Basic - ( 19 Mar 2024 09:55 )    Color: x / Appearance: x / SG: x / pH: x  Gluc: 98 mg/dL / Ketone: x  / Bili: x / Urobili: x   Blood: x / Protein: x / Nitrite: x   Leuk Esterase: x / RBC: x / WBC x   Sq Epi: x / Non Sq Epi: x / Bacteria: x        RADIOLOGY & ADDITIONAL STUDIES:  < from: CT Brain Stroke Protocol (24 @ 10:01) >  IMPRESSION:      1. Evidence of prior surgery in the right frontal region with partial   resection of a glioblastoma. There is increasing edema which may be   related to tumor growth although superimposed right MCA ischemic changes   may be considered as well. MR imaging of the brain with and without   gadolinium is recommended for further assessment. Nonspecific   postoperative changes in the region of the cranioplasty. No acute   hemorrhagic collections noted. Mild leftward midline shift from right   sided brain swelling.  2. No perfusion abnormality or area of acute penumbra suggested.  3. Widely patent head and neck vasculature, with no significant stenosis,   dissection, or occlusion. Tortuosity is prominent in the neck vasculature.    < end of copied text >   HISTORY OF PRESENT ILLNESS:   58 year old male with past medical history of HTN, HLD, melanoma x 3, last resection 1 year ago, well-known to our service with hx of admission 2023 s/p right craniotomy for tumor debulking 23 with Dr. Deleon with pathology consistent with glioblastoma, started on temozolomide for chemotherapy and started radiation therapy on . [place] [facility] [place], N/V with concern of recurrent frontotemporal brain mass on CTH. Underwent redo R crani for tumor resection on 24. Patient presents today due to worsening headaches and pressure behind his eyes over the last 1-2 weeks. Woke up at 4AM this morning due to severe HA and also reported vertical double vision. Took Tylenol with some relief, reports HA is worse with movement. Has been compliant with Keppra and Decadron at home. Pt went to appt at [facility] today for radiation treatment and was told to come to ED due to similar symptoms in February associated with tumor regrowth. CTH done today demonstrates some increased R frontotemporal edema which may be related to tumor growth. Patient currently feels well, states headache/eye pressure has resolved.        PAST MEDICAL & SURGICAL HISTORY:  Melanoma  Hypertension  HLD (hyperlipidemia)  GBM (glioblastoma multiforme)  S/P rotator cuff repair  S/P craniotomy    FAMILY HISTORY:  No pertinent family history in first degree relatives    Allergies    No Known Allergies    Intolerances      REVIEW OF SYSTEMS  Negative except as noted in HPI      HOME MEDICATIONS:  Home Medications:  acetaminophen 325 mg oral tablet: 2 tab(s) orally every 6 hours As needed Temp greater or equal to 38C (100.4F), Mild Pain (1 - 3) (2024 10:12)  Lexapro 10 mg oral tablet: 1 tab(s) orally once a day (2024 09:16)  Zofran ODT 8 mg oral tablet, disintegratin tab(s) orally prn (2024 09:15)      MEDICATIONS:    Neuro:  acetaminophen   IVPB .. 1000 milliGRAM(s) IV Intermittent Once  metoclopramide Injectable 10 milliGRAM(s) IV Push once      Vital Signs Last 24 Hrs  T(C): 36.8 (19 Mar 2024 09:46), Max: 36.8 (19 Mar 2024 09:46)  T(F): 98.3 (19 Mar 2024 09:46), Max: 98.3 (19 Mar 2024 09:46)  HR: 77 (19 Mar 2024 09:46) (77 - 77)  BP: 145/103 (19 Mar 2024 09:46) (145/103 - 145/103)  BP(mean): --  RR: 17 (19 Mar 2024 09:46) (17 - 17)  SpO2: 98% (19 Mar 2024 09:46) (98% - 98%)    Parameters below as of 19 Mar 2024 09:46  Patient On (Oxygen Delivery Method): room air      PHYSICAL EXAM:  GENERAL: NAD, well-groomed  HEAD:  well-healed incision s/p R crani  EYES: Conjunctiva and sclera clear  CECILY COMA SCORE: E-4 V-5 M-6 = 15  MENTAL STATUS: AAO x3; Awake; Opens eyes spontaneously; Appropriately conversant without aphasia; Follows commands  CRANIAL NERVES:  Mild L facial (baseline). PERRL. EOMI without nystagmus. Facial sensation intact V1-3 distribution b/l. Tongue midline. Hearing grossly intact. Speech clear.   MOTOR: strength 5/5 b/l upper and lower extremities  SENSATION: grossly intact to light touch all extremities  COORDINATION: Gait testing deferred  CHEST/LUNG: Non-labored breathing on room air  EXTREMITIES: no clubbing, cyanosis, or edema  SKIN: Warm, dry    LABS:                        16.2   11.02 )-----------( 282      ( 19 Mar 2024 09:55 )             46.7     03-19    139  |  98  |  11.8  ----------------------------<  98  4.8   |  28.0  |  0.67    Ca    9.2      19 Mar 2024 09:55    TPro  6.7  /  Alb  4.0  /  TBili  0.6  /  DBili  x   /  AST  33  /  ALT  79<H>  /  AlkPhos  99  03-19    PT/INR - ( 19 Mar 2024 09:55 )   PT: 9.8 sec;   INR: 0.88 ratio         PTT - ( 19 Mar 2024 09:55 )  PTT:27.1 sec  Urinalysis Basic - ( 19 Mar 2024 09:55 )    Color: x / Appearance: x / SG: x / pH: x  Gluc: 98 mg/dL / Ketone: x  / Bili: x / Urobili: x   Blood: x / Protein: x / Nitrite: x   Leuk Esterase: x / RBC: x / WBC x   Sq Epi: x / Non Sq Epi: x / Bacteria: x        RADIOLOGY & ADDITIONAL STUDIES:  < from: MR Head w/wo IV Cont (24 @ 11:35) >  FINDINGS:    BRAIN:   The brain demonstrates a stable appearance of postoperative   findings in the right anterior cerebral hemisphere. Postoperative cavity   involving the supraorbital lateral right frontal lobe is found deep to   the right-sided craniotomy. Along the posterior lateral margins of this   craniotomy small foci of both linear and focal nodular enhancement are   again noted, similar in appearance to previous. Within in dependent at   the operative bed are predominantly linear regions of low signal   intensity on the susceptibility weighted images reflecting hemorrhagic   component at the operative site. There are mild areas of diffusion   hyperintensityassociated with the surgical site without diffusion   restriction. Infiltrative hyperintensity on the long TR images is again   noted to extend into the deep hemispheric white matter surrounding this   operative site, most extensively in the posterior direction to approach   the atrium of the right lateral ventricle. There is medial extension   associated with mass effect that results in mild right-to-left subfalcine   herniation magnitude of approximately 0.5 cm at maximum, mildly decreased   from previous 0.8 cm in 2024. Infiltrative changes are again   noted to extend inferiorly laterally into the anterior temporal lobe deep   white matter and inferior medially to the left optic tract. The extent of   this infiltrative nonenhancing disease is not substantially changed.    No remote sites of abnormal enhancement or mass effect has developed. The   left renal hemisphere and posterior fossa remain intact.    No diffusion restriction is found in the brain.  No acute cerebral   cortical infarct is found.    CSF SPACES:   The ventricles, sulci and basal cisterns have an unchanged   configuration    VESSELS:   The vertebral and internal carotid arteries demonstrate   expected flow voids indicating their patency.    HEAD AND NECK STRUCTURES:   The orbits are unremarkable.  Paranasal   sinuses are clear.  The nasal cavity appears intact.  The central skull   base appears intact.  The nasopharynx is symmetric.  The temporal bones   appear clear of disease.      IMPRESSION:    1.Right anterior cerebral hemispheric treatment related changes.   Decrease in mass effect when compared to 2024. No significant   change in multifocal small foci of indeterminate enhancement when   compared to 2024. No significant change in extensive   infiltrative nonenhancing signal intensity abnormality within the   anterior aspect of the right cerebral hemisphere when compared to   2024.    2. No diffusion restriction to suggest acute infarction    < end of copied text >    < from: CT Brain Stroke Protocol (24 @ 10:01) >  IMPRESSION:      1. Evidence of prior surgery in the right frontal region with partial   resection of a glioblastoma. There is increasing edema which may be   related to tumor growth although superimposed right MCA ischemic changes   may be considered as well. MR imaging of the brain with and without   gadolinium is recommended for further assessment. Nonspecific   postoperative changes in the region of the cranioplasty. No acute   hemorrhagic collections noted. Mild leftward midline shift from right   sided brain swelling.  2. No perfusion abnormality or area of acute penumbra suggested.  3. Widely patent head and neck vasculature, with no significant stenosis,   dissection, or occlusion. Tortuosity is prominent in the neck vasculature.    < end of copied text >   HISTORY OF PRESENT ILLNESS:   58 year old male with past medical history of HTN, HLD, melanoma x 3, last resection 1 year ago, well-known to our service with hx of admission 2023 s/p right craniotomy for tumor debulking 23 with Dr. Deleon with pathology consistent with glioblastoma, started on temozolomide for chemotherapy and started radiation therapy on . Pt presented to [facility] [place] on 24 for worsening headache, N/V with concern of recurrent frontotemporal brain mass on CTH. Underwent redo R crani for tumor resection on 24. Patient presents today due to worsening headaches and pressure behind his eyes over the last 1-2 weeks. Woke up at 4AM this morning due to severe HA and also reported vertical double vision. Took Tylenol with some relief, reports HA is worse with movement. Has been compliant with Keppra and Decadron at home. Pt went to appt at [facility] today for radiation treatment and was told to come to ED due to similar symptoms in February associated with tumor regrowth. CTH done today demonstrates some R frontotemporal edema which may be related to tumor growth. Patient currently feels well, states headache/eye pressure has resolved.        PAST MEDICAL & SURGICAL HISTORY:  Melanoma  Hypertension  HLD (hyperlipidemia)  GBM (glioblastoma multiforme)  S/P rotator cuff repair  S/P craniotomy    FAMILY HISTORY:  No pertinent family history in first degree relatives    Allergies    No Known Allergies    Intolerances      REVIEW OF SYSTEMS  Negative except as noted in HPI      HOME MEDICATIONS:  Home Medications:  acetaminophen 325 mg oral tablet: 2 tab(s) orally every 6 hours As needed Temp greater or equal to 38C (100.4F), Mild Pain (1 - 3) (2024 10:12)  Lexapro 10 mg oral tablet: 1 tab(s) orally once a day (2024 09:16)  Zofran ODT 8 mg oral tablet, disintegratin tab(s) orally prn (2024 09:15)      MEDICATIONS:    Neuro:  acetaminophen   IVPB .. 1000 milliGRAM(s) IV Intermittent Once  metoclopramide Injectable 10 milliGRAM(s) IV Push once      Vital Signs Last 24 Hrs  T(C): 36.8 (19 Mar 2024 09:46), Max: 36.8 (19 Mar 2024 09:46)  T(F): 98.3 (19 Mar 2024 09:46), Max: 98.3 (19 Mar 2024 09:46)  HR: 77 (19 Mar 2024 09:46) (77 - 77)  BP: 145/103 (19 Mar 2024 09:46) (145/103 - 145/103)  BP(mean): --  RR: 17 (19 Mar 2024 09:46) (17 - 17)  SpO2: 98% (19 Mar 2024 09:46) (98% - 98%)    Parameters below as of 19 Mar 2024 09:46  Patient On (Oxygen Delivery Method): room air      PHYSICAL EXAM:  GENERAL: NAD, well-groomed  HEAD:  well-healed incision s/p R crani  EYES: Conjunctiva and sclera clear  CECILY COMA SCORE: E-4 V-5 M-6 = 15  MENTAL STATUS: AAO x3; Awake; Opens eyes spontaneously; Appropriately conversant without aphasia; Follows commands  CRANIAL NERVES:  Mild L facial (baseline). PERRL. EOMI without nystagmus. Facial sensation intact V1-3 distribution b/l. Tongue midline. Hearing grossly intact. Speech clear.   MOTOR: strength 5/5 b/l upper and lower extremities  SENSATION: grossly intact to light touch all extremities  COORDINATION: Gait testing deferred  CHEST/LUNG: Non-labored breathing on room air  EXTREMITIES: no clubbing, cyanosis, or edema  SKIN: Warm, dry    LABS:                        16.2   11.02 )-----------( 282      ( 19 Mar 2024 09:55 )             46.7     03-19    139  |  98  |  11.8  ----------------------------<  98  4.8   |  28.0  |  0.67    Ca    9.2      19 Mar 2024 09:55    TPro  6.7  /  Alb  4.0  /  TBili  0.6  /  DBili  x   /  AST  33  /  ALT  79<H>  /  AlkPhos  99  03-19    PT/INR - ( 19 Mar 2024 09:55 )   PT: 9.8 sec;   INR: 0.88 ratio         PTT - ( 19 Mar 2024 09:55 )  PTT:27.1 sec  Urinalysis Basic - ( 19 Mar 2024 09:55 )    Color: x / Appearance: x / SG: x / pH: x  Gluc: 98 mg/dL / Ketone: x  / Bili: x / Urobili: x   Blood: x / Protein: x / Nitrite: x   Leuk Esterase: x / RBC: x / WBC x   Sq Epi: x / Non Sq Epi: x / Bacteria: x        RADIOLOGY & ADDITIONAL STUDIES:  < from: MR Head w/wo IV Cont (24 @ 11:35) >  FINDINGS:    BRAIN:   The brain demonstrates a stable appearance of postoperative   findings in the right anterior cerebral hemisphere. Postoperative cavity   involving the supraorbital lateral right frontal lobe is found deep to   the right-sided craniotomy. Along the posterior lateral margins of this   craniotomy small foci of both linear and focal nodular enhancement are   again noted, similar in appearance to previous. Within in dependent at   the operative bed are predominantly linear regions of low signal   intensity on the susceptibility weighted images reflecting hemorrhagic   component at the operative site. There are mild areas of diffusion   hyperintensityassociated with the surgical site without diffusion   restriction. Infiltrative hyperintensity on the long TR images is again   noted to extend into the deep hemispheric white matter surrounding this   operative site, most extensively in the posterior direction to approach   the atrium of the right lateral ventricle. There is medial extension   associated with mass effect that results in mild right-to-left subfalcine   herniation magnitude of approximately 0.5 cm at maximum, mildly decreased   from previous 0.8 cm in 2024. Infiltrative changes are again   noted to extend inferiorly laterally into the anterior temporal lobe deep   white matter and inferior medially to the left optic tract. The extent of   this infiltrative nonenhancing disease is not substantially changed.    No remote sites of abnormal enhancement or mass effect has developed. The   left renal hemisphere and posterior fossa remain intact.    No diffusion restriction is found in the brain.  No acute cerebral   cortical infarct is found.    CSF SPACES:   The ventricles, sulci and basal cisterns have an unchanged   configuration    VESSELS:   The vertebral and internal carotid arteries demonstrate   expected flow voids indicating their patency.    HEAD AND NECK STRUCTURES:   The orbits are unremarkable.  Paranasal   sinuses are clear.  The nasal cavity appears intact.  The central skull   base appears intact.  The nasopharynx is symmetric.  The temporal bones   appear clear of disease.      IMPRESSION:    1.Right anterior cerebral hemispheric treatment related changes.   Decrease in mass effect when compared to 2024. No significant   change in multifocal small foci of indeterminate enhancement when   compared to 2024. No significant change in extensive   infiltrative nonenhancing signal intensity abnormality within the   anterior aspect of the right cerebral hemisphere when compared to   2024.    2. No diffusion restriction to suggest acute infarction    < end of copied text >    < from: CT Brain Stroke Protocol (24 @ 10:01) >  IMPRESSION:      1. Evidence of prior surgery in the right frontal region with partial   resection of a glioblastoma. There is increasing edema which may be   related to tumor growth although superimposed right MCA ischemic changes   may be considered as well. MR imaging of the brain with and without   gadolinium is recommended for further assessment. Nonspecific   postoperative changes in the region of the cranioplasty. No acute   hemorrhagic collections noted. Mild leftward midline shift from right   sided brain swelling.  2. No perfusion abnormality or area of acute penumbra suggested.  3. Widely patent head and neck vasculature, with no significant stenosis,   dissection, or occlusion. Tortuosity is prominent in the neck vasculature.    < end of copied text >

## 2024-03-19 NOTE — CONSULT NOTE ADULT - NS ATTEND AMEND GEN_ALL_CORE FT
I agree with the above. I personally examined and saw the patient. Baseline left facial droop but otherwise intact and without headache. Head imaging reassuring. Updated patient, son, and his daughter in real time. Discussed with Dr. Calix and Dr. Patel as well. Ok for discharge and continued outpatient follow-up.

## 2024-03-19 NOTE — PHYSICAL EXAM
[de-identified] : staples removed; minimal erythema of the scalp [de-identified] : No proptosis

## 2024-03-19 NOTE — ED PROVIDER NOTE - CLINICAL SUMMARY MEDICAL DECISION MAKING FREE TEXT BOX
58-year-old male with GBM on chemo and radiation status post resections, most recently 2/14 presents for headache that woke him up from sleep at 4 AM, associated with vertical double vision.  Code stroke called by triage RN, will perform CAT scans, however will not anticoagulate patient due to known brain mass.   I discussed with neurosurgery attending who is evaluating patient as well.  No indication for TNK.

## 2024-03-19 NOTE — ED ADULT NURSE NOTE - NSFALLUNIVINTERV_ED_ALL_ED
Bed/Stretcher in lowest position, wheels locked, appropriate side rails in place/Call bell, personal items and telephone in reach/Instruct patient to call for assistance before getting out of bed/chair/stretcher/Non-slip footwear applied when patient is off stretcher/[place] to call system/Physically safe environment - no spills, clutter or unnecessary equipment/Purposeful proactive rounding/Room/bathroom lighting operational, light cord in reach

## 2024-03-19 NOTE — ED PROVIDER NOTE - PATIENT PORTAL LINK FT
You can access the FollowMyHealth Patient Portal offered by [facility] by registering at the following website: [email or web address]. By joining [facility]’s FollowMyHealth portal, you will also be able to view your health information using other applications (apps) compatible with our system.

## 2024-03-19 NOTE — ED PROVIDER NOTE - PROVIDER WHO IDEPENDENTLY INTERPRETED
ANTICOAGULATION MANAGEMENT     Annabella Bolanos 66 year old female is on warfarin with therapeutic INR result. (Goal INR 2.0-3.0)    Recent labs: (last 7 days)     02/13/23  1115   INR 2.2*       ASSESSMENT       Source(s): Chart Review and Patient/Caregiver Call       Warfarin doses taken: Warfarin taken as instructed    Diet: No new diet changes identified. Though Annabella reports she will be headed to [place] in a few weeks and anticipates alcohol intake to be higher than normal. Will attempt to combat with increasing her greens over this time.    New illness, injury, or hospitalization: No    Medication/supplement changes: None noted    Signs or symptoms of bleeding or clotting: No    Previous INR: Subtherapeutic    Additional findings: None       PLAN     Recommended plan for no diet, medication or health factor changes affecting INR     Dosing Instructions: Continue your current warfarin dose with next INR in 3 weeks       Summary  As of 2/13/2023    Full warfarin instructions:  7.5 mg every Mon, Fri; 5 mg all other days   Next INR check:  3/6/2023             Telephone call with Annabella who verbalizes understanding and agrees to plan    Lab visit scheduled    Education provided:     Please call back if any changes to your diet, medications or how you've been taking warfarin    Dietary considerations: impact of vitamin K foods on INR    Plan made per [facility] anticoagulation protocol    Kusum Agiulera RN  Anticoagulation Clinic  2/13/2023    _______________________________________________________________________     Anticoagulation Episode Summary     Current INR goal:  2.0-3.0   TTR:  94.6 % (1 y)   Target end date:  Indefinite   Send INR reminders to:  [facility]    Indications    Long-term (current) use of anticoagulants [Z79.01] [Z79.01]  Aortic valve replaced [Z95.2]           Comments:           Anticoagulation Care Providers     Provider Role Specialty Phone number    Brook Echavarria  MD [facility] Internal Medicine [phone]           Herminia Bartholomew, DO

## 2024-03-19 NOTE — ED ADULT NURSE NOTE - BEFAST ARM SIDE DRIFT
Medication(s) Requested: oxycodone  Last office visit: 0104/2022  Scheduled appointment 03/28/2022  Last Dispensed: 01/07/2022  Is the patient due for refill of this medication(s): No Due 02/04/2022, script wrote for that   PDMP review: Criteria met. Forwarded to Physician/MELANIE for signature.       
Medication: Oxycodone I.R.  Sig: take 1 tablet q 8hrs prn pa  Qty: 90  Dosage: 15  Last Refill: 01-  Pharmacy: [facility]  Patient last seen:  Next appointment to be seen:    
No

## 2024-03-19 NOTE — ED PROVIDER NOTE - CARE PLAN
Principal Discharge DX:	Diplopia  Secondary Diagnosis:	Acute headache  Secondary Diagnosis:	GBM (glioblastoma multiforme)   1

## 2024-03-19 NOTE — DISEASE MANAGEMENT
[FreeTextEntry4] : high-grade glioma (astrocytoma, WHO IV, IDH wt) [NTNM] : - [TTNM] : - [MTNM] : - [de-identified] : 2804 [de-identified] : 5423 [de-identified] : partial brain

## 2024-03-19 NOTE — ED PROVIDER NOTE - NS ED ROS FT
Const: Denies fever, chills  HEENT: + double vision. Denies sore throat  Neck: Denies neck pain/stiffness  Resp: Denies coughing, SOB  Cardiovascular: Denies CP, palpitations, LE edema  GI: Denies nausea, vomiting, abdominal pain, diarrhea, constipation, blood in stool  : Denies urinary frequency/urgency/dysuria, hematuria  MSK: Denies back pain  Neuro: + HA. Denies dizziness, numbness, weakness  Skin: Denies rashes.

## 2024-03-19 NOTE — ED PROVIDER NOTE - PHYSICAL EXAMINATION
Const: Awake, alert and oriented. In no acute distress. Well appearing.  HEENT: NC/AT. Well healing surgical scar on head. Moist mucous membranes.  Eyes: No scleral icterus. EOMI.  Neck:. Soft and supple. Full ROM without pain. No meningismus.  Cardiac: Regular rate and regular rhythm. +S1/S2. No murmurs. Peripheral pulses 2+ and symmetric. No LE edema.  Resp: Speaking in full sentences. No evidence of respiratory distress. No wheezes, rales or rhonchi.  Abd: Soft, non-tender, non-distended. Normal bowel sounds in all 4 quadrants. No guarding or rebound.  Back: Spine midline and non-tender. No CVAT.  Skin: No rashes, abrasions or lacerations.  Neuro: Awake, alert & oriented x 3. CN II-XII symmetrically intact. No pronator drift, no LE drift. Clear speech. Moves all extremities symmetrically.

## 2024-03-19 NOTE — ED PROVIDER NOTE - CRITICAL CARE ATTENDING CONTRIBUTION TO CARE
Herminia SALES DO, have personally provided 45 minutes of critical care time exclusive of time spent on separately billable procedures. Time includes review of laboratory data, radiology results, discussion with consultants, and monitoring for potential decompensation. Interventions were performed as documented above.

## 2024-03-19 NOTE — ED ADULT NURSE NOTE - OBJECTIVE STATEMENT
Pt is a 58YOM who is here after waking up at 0400 with a headache that woke him up from sleep and double vision, pt states that the headache has resolved as has the double vision, pt has no deficits noted on exam, pt states that he had brain surgery on february 14th and has been on chemo and radiation, pt went to radiation therapy this morning and reported his symptoms to them but they advised him to come to the hospital for a CT, Dr. Deleon arrived at bedside to assess pt with Sandi CASTELLON for stroke code called.

## 2024-03-19 NOTE — CONSULT NOTE ADULT - ASSESSMENT
Assessment:  58M PMHx of HTN, HLD, melanoma x 3, last resection 1 year ago, well-known to our service with hx of admission 12/2023 s/p right craniotomy for tumor debulking 12/29/23 with Dr. Deleon with pathology consistent with GBM with rapid regrowth and redo R crani for recurrent tumor on 2/13/24. Pt presents today due to worsening headaches and double vision.    Plan:  - CTH demonstrates some increased R frontotemporal edema when compared to most recent CTH on 2/29  - Pending MRI w/wo with fiducials   - Further neurosurgical plan pending imaging  - Discussed with Dr. Deleon Assessment:  58M PMHx of HTN, HLD, melanoma x 3, last resection 1 year ago, well-known to our service with hx of admission 12/2023 s/p right craniotomy for tumor debulking 12/29/23 with Dr. Deleon with pathology consistent with GBM with rapid regrowth and redo R crani for recurrent tumor on 2/13/24. Pt presents today due to worsening headaches and double vision.    Plan:  - CTH demonstrates some increased R frontotemporal edema when compared to most recent CTH on 2/29  - MRI w/wo with stable post-op findings and treatment related changes  - No role for acute neurosurgical intervention  - Continue decadron 4mg  - Follow up at scheduled outpatient appointment with Dr. Deleon  - Discussed with Dr. Deleon Assessment:  58M PMHx of HTN, HLD, melanoma x 3, last resection 1 year ago, well-known to our service with hx of admission 12/2023 s/p right craniotomy for tumor debulking 12/29/23 with Dr. Deleon with pathology consistent with GBM with rapid regrowth and redo R crani for recurrent tumor on 2/13/24. Pt presents today due to worsening headaches and double vision.    Plan:  - CTH demonstrates some increased R frontotemporal edema when compared to most recent CTH on 2/29  - MRI w/wo with stable post-op findings and treatment related changes  - No role for acute neurosurgical intervention  - Continue decadron 4mg daily  - Follow up at scheduled outpatient appointment with Dr. Deleon  - Discussed with Dr. Deleon

## 2024-03-20 PROCEDURE — 77427 RADIATION TX MANAGEMENT X5: CPT

## 2024-03-20 PROCEDURE — G6002: CPT | Mod: 26

## 2024-03-21 ENCOUNTER — APPOINTMENT (OUTPATIENT)
Dept: HEMATOLOGY ONCOLOGY | Facility: CLINIC | Age: 59
End: 2024-03-21

## 2024-03-21 ENCOUNTER — APPOINTMENT (OUTPATIENT)
Dept: NEUROLOGY | Facility: CLINIC | Age: 59
End: 2024-03-21
Payer: COMMERCIAL

## 2024-03-21 ENCOUNTER — RESULT REVIEW (OUTPATIENT)
Age: 59
End: 2024-03-21

## 2024-03-21 ENCOUNTER — NON-APPOINTMENT (OUTPATIENT)
Age: 59
End: 2024-03-21

## 2024-03-21 VITALS
SYSTOLIC BLOOD PRESSURE: 130 MMHG | BODY MASS INDEX: 28.12 KG/M2 | DIASTOLIC BLOOD PRESSURE: 90 MMHG | HEART RATE: 100 BPM | WEIGHT: 196.43 LBS | HEIGHT: 70 IN | OXYGEN SATURATION: 96 %

## 2024-03-21 LAB
BASOPHILS # BLD AUTO: 0.1 K/UL — SIGNIFICANT CHANGE UP (ref 0–0.2)
BASOPHILS NFR BLD AUTO: 1.2 % — SIGNIFICANT CHANGE UP (ref 0–2)
EOSINOPHIL # BLD AUTO: 0 K/UL — SIGNIFICANT CHANGE UP (ref 0–0.5)
EOSINOPHIL NFR BLD AUTO: 0.3 % — SIGNIFICANT CHANGE UP (ref 0–6)
HCT VFR BLD CALC: 43 % — SIGNIFICANT CHANGE UP (ref 39–50)
HGB BLD-MCNC: 16.1 G/DL — SIGNIFICANT CHANGE UP (ref 13–17)
LYMPHOCYTES # BLD AUTO: 0.9 K/UL — LOW (ref 1–3.3)
LYMPHOCYTES # BLD AUTO: 8.3 % — LOW (ref 13–44)
MCHC RBC-ENTMCNC: 32.8 PG — SIGNIFICANT CHANGE UP (ref 27–34)
MCHC RBC-ENTMCNC: 37.4 G/DL — HIGH (ref 32–36)
MCV RBC AUTO: 87.8 FL — SIGNIFICANT CHANGE UP (ref 80–100)
MONOCYTES # BLD AUTO: 0.8 K/UL — SIGNIFICANT CHANGE UP (ref 0–0.9)
MONOCYTES NFR BLD AUTO: 7.7 % — SIGNIFICANT CHANGE UP (ref 2–14)
NEUTROPHILS # BLD AUTO: 8.6 K/UL — HIGH (ref 1.8–7.4)
NEUTROPHILS NFR BLD AUTO: 82.4 % — HIGH (ref 43–77)
PLATELET # BLD AUTO: 354 K/UL — SIGNIFICANT CHANGE UP (ref 150–400)
RBC # BLD: 4.89 M/UL — SIGNIFICANT CHANGE UP (ref 4.2–5.8)
RBC # FLD: 13 % — SIGNIFICANT CHANGE UP (ref 10.3–14.5)
WBC # BLD: 10.5 K/UL — SIGNIFICANT CHANGE UP (ref 3.8–10.5)
WBC # FLD AUTO: 10.5 K/UL — SIGNIFICANT CHANGE UP (ref 3.8–10.5)

## 2024-03-21 PROCEDURE — 99215 OFFICE O/P EST HI 40 MIN: CPT

## 2024-03-21 PROCEDURE — 77014: CPT | Mod: 26

## 2024-03-21 NOTE — HISTORY OF PRESENT ILLNESS
[FreeTextEntry1] : NEURO-ONCOLOGY  This 58 year old right handed man is in follow up regarding glioblastoma  He presented in 12/23 with headaches and memory loss, with confusion.  He developed left facial weakness.  He was found to have a right frontal and insular cystic and enhancing lesion. CTC AP was unrevealing.  Tumor was subtotally resected by Dr. Deleon on 12/29/23, revealing glioblastoma, IDH wt, MGMT methylated, Caris with EGFR amplification, TERT, RONA and MDM2 mutations, TMB 2.  He started chemoRT, but was admitted with massive progression and had resection of right frontal tumor on 2/14/24.   INTERVAL HISTORY:  Was in ER with headaches, MRI stable to improved mass effect, no progressive tumor.  CBC stable.  Doing well on chemo.  Gait improved. .     PMH:  He had a number of cutaneous melanomas removed, but without systemic therapy or invasion, last of these was 12 mo ago.  PSH: as above. orthopedic surgery on shoulder. SHX:  Here with daughter Erica, not present is son Fabiano (a podiatrist) and ex wife Tammi. 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 Nonsmoker. nondrinker FHX:  no brain tumors

## 2024-03-21 NOTE — REASON FOR VISIT
[Routine On-Treatment] : a routine on-treatment visit for [Family Member] : family member [Brain Tumor] : brain tumor

## 2024-03-21 NOTE — REVIEW OF SYSTEMS
[Nausea: Grade 0] : Nausea: Grade 0 [Vomiting: Grade 0] : Vomiting: Grade 0 [Edema Limbs: Grade 0] : Edema Limbs: Grade 0  [Fatigue: Grade 1 - Fatigue relieved by rest] : Fatigue: Grade 1 - Fatigue relieved by rest [Localized Edema: Grade 0] : Localized Edema: Grade 0  [Neck Edema: Grade 0] : Neck Edema: Grade 0 [Mucositis Oral: Grade 0] : Mucositis Oral: Grade 0  [Blurred Vision: Grade 1 - Intervention not indicated] : Blurred Vision: Grade 1 - Intervention not indicated [Dysgeusia: Grade 0] : Dysgeusia: Grade 0 [Dizziness: Grade 1 - Mild unsteadiness or sensation of movement] : Dizziness: Grade 1 - Mild unsteadiness or sensation of movement [Headache: Grade 0] : Headache: Grade 0 [Alopecia: Grade 1 - Hair loss of <50% of normal for that individual that is not obvious from a distance but only on close inspection; a different hair style may be required to cover the hair loss but it does not require a wig or hair piece to camouflage] : Alopecia: Grade 1 - Hair loss of <50% of normal for that individual that is not obvious from a distance but only on close inspection; a different hair style may be required to cover the hair loss but it does not require a wig or hair piece to camouflage [Skin Hyperpigmentation: Grade 0] : Skin Hyperpigmentation: Grade 0

## 2024-03-21 NOTE — PHYSICAL EXAM
[FreeTextEntry1] : Exam as below (with documented exceptions in parentheses):  General:  well appearing man KPS is 80  Mental Status:  Awake, alert and attentive. Oriented to person, place and time. Recent and remote memory intact. Normal concentration. Fluent spontaneous speech with intact naming and repetition. Normal fund of knowledge.    Cranial Nerves: II: Full visual fields. III,IV,VI:Pupils round, reactive to light. Full extraocular movements. No nystagmus. V: Normal bilateral bite, facial sensation. VII: No facial weakness. VIII: Hearing intact. IX,X: Palate midline, intact gag. XI:  Sternocleidomastoids normal. XII: Tongue protrudes midline. No dysarthria.   Motor:  Normal tone, bulk and power throughout including arms and legs, proximal and distal. No pronator drift. No abnormal movements.   Sensation: Normal in arms, legs and trunk to pin, proprioception and vibration. Negative Romberg.   Coordination: No dysmetria or dysdiadochokinesis bilaterally. Normal heel-shin testing.   Gait: Normal including heel and toe walking. Normal station.  (walks normally without assistance)  Reflexes: Normoactive and symmetric throughout. Absent Babinski bilaterally.   Vascular: No peripheral edema/calf tenderness.   Eyes: Funduscopic exam without papilledema (deferred)  Heart: Regular rate and rhythm. Normal s1-s2. No murmurs, rubs or gallops.   Respiratory: Lungs clear to auscultation bilaterally.   Abdomen: Nontender, non-distended. No hepatosplenomegaly. Normal bowel sounds in four quadrants.

## 2024-03-21 NOTE — DATA REVIEWED
[de-identified] : Reviewed pre and post operative scans from 12/23 showing subtotal resection of cystic enhancing right frontal and insular lesion, there are satellite foci of enhancement that remain, markedly worse on 2/12/24 MRI with 2/13/24 MRI showing extensive resection of right frontal enhancement, stable on 3/19/24 MRI  No progressive tumor or edema on 2/27 CT head

## 2024-03-21 NOTE — DISCUSSION/SUMMARY
[FreeTextEntry1] : Newly diagnosed glioblastoma, MGMT methylated.  ChemoRT interrupted by early POD, s/p resection.  Has resumed chemoRT.   We discussed completion of chemoRT, to hold TMZ after RT completed and possible clinical trial if eligible (Alpheus)  Continue dex 4 for now, will taper a bit next visit Keppra 1000 bid for now, taper in future Continue daily TMZ with RT Weekly CBC encouraged Coordinated eval by Dr. Mcclendon to discuss alpheus study TEB on 4/8/24 MRI and visit coordinated for 5/2/24

## 2024-03-22 PROCEDURE — G6002: CPT | Mod: 26

## 2024-03-25 PROCEDURE — G6002: CPT | Mod: 26

## 2024-03-26 PROCEDURE — G6002: CPT | Mod: 26

## 2024-03-27 ENCOUNTER — NON-APPOINTMENT (OUTPATIENT)
Age: 59
End: 2024-03-27

## 2024-03-27 PROCEDURE — G6002: CPT | Mod: 26

## 2024-03-28 ENCOUNTER — RESULT REVIEW (OUTPATIENT)
Age: 59
End: 2024-03-28

## 2024-03-28 ENCOUNTER — APPOINTMENT (OUTPATIENT)
Dept: HEMATOLOGY ONCOLOGY | Facility: CLINIC | Age: 59
End: 2024-03-28

## 2024-03-28 VITALS
DIASTOLIC BLOOD PRESSURE: 92 MMHG | RESPIRATION RATE: 16 BRPM | OXYGEN SATURATION: 98 % | HEART RATE: 96 BPM | WEIGHT: 197 LBS | HEIGHT: 70 IN | BODY MASS INDEX: 28.2 KG/M2 | TEMPERATURE: 98 F | SYSTOLIC BLOOD PRESSURE: 146 MMHG

## 2024-03-28 LAB
BASOPHILS # BLD AUTO: 0.1 K/UL — SIGNIFICANT CHANGE UP (ref 0–0.2)
BASOPHILS NFR BLD AUTO: 1.4 % — SIGNIFICANT CHANGE UP (ref 0–2)
EOSINOPHIL # BLD AUTO: 0 K/UL — SIGNIFICANT CHANGE UP (ref 0–0.5)
EOSINOPHIL NFR BLD AUTO: 0.6 % — SIGNIFICANT CHANGE UP (ref 0–6)
HCT VFR BLD CALC: 45.5 % — SIGNIFICANT CHANGE UP (ref 39–50)
HGB BLD-MCNC: 16.6 G/DL — SIGNIFICANT CHANGE UP (ref 13–17)
LYMPHOCYTES # BLD AUTO: 1.7 K/UL — SIGNIFICANT CHANGE UP (ref 1–3.3)
LYMPHOCYTES # BLD AUTO: 19 % — SIGNIFICANT CHANGE UP (ref 13–44)
MCHC RBC-ENTMCNC: 32.3 PG — SIGNIFICANT CHANGE UP (ref 27–34)
MCHC RBC-ENTMCNC: 36.5 G/DL — HIGH (ref 32–36)
MCV RBC AUTO: 88.6 FL — SIGNIFICANT CHANGE UP (ref 80–100)
MONOCYTES # BLD AUTO: 0.4 K/UL — SIGNIFICANT CHANGE UP (ref 0–0.9)
MONOCYTES NFR BLD AUTO: 4.6 % — SIGNIFICANT CHANGE UP (ref 2–14)
NEUTROPHILS # BLD AUTO: 6.5 K/UL — SIGNIFICANT CHANGE UP (ref 1.8–7.4)
NEUTROPHILS NFR BLD AUTO: 74.4 % — SIGNIFICANT CHANGE UP (ref 43–77)
PLATELET # BLD AUTO: 344 K/UL — SIGNIFICANT CHANGE UP (ref 150–400)
RBC # BLD: 5.14 M/UL — SIGNIFICANT CHANGE UP (ref 4.2–5.8)
RBC # FLD: 12.6 % — SIGNIFICANT CHANGE UP (ref 10.3–14.5)
WBC # BLD: 8.7 K/UL — SIGNIFICANT CHANGE UP (ref 3.8–10.5)
WBC # FLD AUTO: 8.7 K/UL — SIGNIFICANT CHANGE UP (ref 3.8–10.5)

## 2024-03-28 PROCEDURE — 77014: CPT | Mod: 26

## 2024-03-28 NOTE — VITALS
[Maximal Pain Intensity: 10/10] : 10/10 [Least Pain Intensity: 5/10] : 5/10 [Pain Description/Quality: ___] : Pain description/quality: [unfilled] [Pain Duration: ___] : Pain duration: [unfilled] [Pain Location: ___] : Pain Location: [unfilled] [Pain Interferes with ADLs] : Pain interferes with activities of daily living. [OTC] : OTC [80: Normal activity with effort; some signs or symptoms of disease.] : 80: Normal activity with effort; some signs or symptoms of disease.  [Maximal Pain Intensity: 1/10] : 1/10 [Least Pain Intensity: 0/10] : 0/10

## 2024-03-29 PROCEDURE — G6002: CPT | Mod: 26

## 2024-04-01 ENCOUNTER — APPOINTMENT (OUTPATIENT)
Dept: NEUROSURGERY | Facility: CLINIC | Age: 59
End: 2024-04-01
Payer: COMMERCIAL

## 2024-04-01 VITALS
DIASTOLIC BLOOD PRESSURE: 89 MMHG | BODY MASS INDEX: 28.49 KG/M2 | SYSTOLIC BLOOD PRESSURE: 126 MMHG | TEMPERATURE: 97.5 F | HEART RATE: 73 BPM | WEIGHT: 199 LBS | HEIGHT: 70 IN | OXYGEN SATURATION: 96 %

## 2024-04-01 PROCEDURE — 99212 OFFICE O/P EST SF 10 MIN: CPT | Mod: 24

## 2024-04-01 PROCEDURE — G6002: CPT | Mod: 26

## 2024-04-01 NOTE — HISTORY OF PRESENT ILLNESS
[FreeTextEntry1] : Mr. Fabiano Kovacs is a very pleasant 58-year-old male with a history of hypertension, hyperlipidemia, skin melanoma resected 3 times previously (last about 1 year ago) who presented with several months of altered mental status, repetitive speech, blurry vision, worsening memory, and headaches found to have a right frontal contrast-enhancing lesion and underwent resection on December 29, 2023 with regrowth of tumor requiring repeat resection on February 13, 2024. He was recently evaluated in the ER on March 19 due to severe headaches but fortunately his imaging at that time was stable without new tumor regrowth.  He has completed his chemotherapy and is still undergoing radiation.  He still has some intermittent headaches but they are mild.  He otherwise denies any new focal neurological deficits. No issues with the incison.

## 2024-04-01 NOTE — PHYSICAL EXAM
[FreeTextEntry1] : AOx3 Improved left lower facial weakness 5/5 strength throughout No pronator drift Incision well healed

## 2024-04-02 PROCEDURE — G6002: CPT | Mod: 26,59

## 2024-04-02 PROCEDURE — G6017: CPT

## 2024-04-03 PROCEDURE — G6002: CPT | Mod: 26

## 2024-04-03 PROCEDURE — 77427 RADIATION TX MANAGEMENT X5: CPT

## 2024-04-03 NOTE — DISEASE MANAGEMENT
[Clinical] : TNM Stage: c [N/A] : Currently not applicable [FreeTextEntry4] : high-grade glioma (astrocytoma, WHO IV, IDH wt) [TTNM] : - [NTNM] : - [MTNM] : - [de-identified] : 4096 [de-identified] : 7994 [de-identified] : partial brain

## 2024-04-03 NOTE — PHYSICAL EXAM
[Outer Ear] : the ears and nose were normal in appearance [Extraocular Movements] : extraocular movements were intact [Normal] : oriented to person, place and time, the affect was normal, the mood was normal and not anxious [Sclera] : the sclera and conjunctiva were normal [Motor Exam] : the motor exam was normal [de-identified] : No proptosis [de-identified] : mild erythema and hyperpigmentation of the right frontal scalp

## 2024-04-04 ENCOUNTER — APPOINTMENT (OUTPATIENT)
Dept: HEMATOLOGY ONCOLOGY | Facility: CLINIC | Age: 59
End: 2024-04-04

## 2024-04-08 ENCOUNTER — APPOINTMENT (OUTPATIENT)
Dept: NEUROLOGY | Facility: CLINIC | Age: 59
End: 2024-04-08

## 2024-04-11 ENCOUNTER — APPOINTMENT (OUTPATIENT)
Dept: NEUROLOGY | Facility: CLINIC | Age: 59
End: 2024-04-11
Payer: COMMERCIAL

## 2024-04-11 LAB
ALBUMIN SERPL ELPH-MCNC: 4.5 G/DL
ALP BLD-CCNC: 103 U/L
ALT SERPL-CCNC: 52 U/L
ANION GAP SERPL CALC-SCNC: 21 MMOL/L
AST SERPL-CCNC: 23 U/L
BILIRUB SERPL-MCNC: 0.7 MG/DL
BUN SERPL-MCNC: 8 MG/DL
CALCIUM SERPL-MCNC: 9.7 MG/DL
CHLORIDE SERPL-SCNC: 98 MMOL/L
CO2 SERPL-SCNC: 21 MMOL/L
CREAT SERPL-MCNC: 0.8 MG/DL
EGFR: 102 ML/MIN/1.73M2
GLUCOSE SERPL-MCNC: 75 MG/DL
POTASSIUM SERPL-SCNC: 3.6 MMOL/L
PROT SERPL-MCNC: 6.9 G/DL
SODIUM SERPL-SCNC: 140 MMOL/L

## 2024-04-11 PROCEDURE — 99214 OFFICE O/P EST MOD 30 MIN: CPT | Mod: 95

## 2024-04-11 NOTE — DISCUSSION/SUMMARY
[FreeTextEntry1] : Glioblastoma, MGMT methylated.  ChemoRT interrupted by early POD, s/p resection.  Completed chemoRT.   Plan for treatment on [facility] protocol.   taper dex to 2 daily Keppra 1000 bid for now, taper in future MRI TBD by research team in late 4/24 in person visit in 5/24 to prescribe chemo on study

## 2024-04-11 NOTE — REASON FOR VISIT
[Home] : at home, [unfilled] , at the time of the visit. [Medical Office: ([facility])___] : at the medical office located in  [Partner] : partner [Family Member] : family member [Patient] : the patient [Self] : self

## 2024-04-11 NOTE — PHYSICAL EXAM
[FreeTextEntry1] : Limited by TEB  Well appearing incision intact no oral thrush Alert and oriented, able to provide history, seems at cognitive baseline Face with flat NLF on left, but normal smile, EOMI, tongue midline Motor without drift or apparent difficulty moving any extremity  ambulates on own with normal station and gait

## 2024-04-11 NOTE — DATA REVIEWED
[de-identified] : Reviewed pre and post operative scans from 12/23 showing subtotal resection of cystic enhancing right frontal and insular lesion, there are satellite foci of enhancement that remain, markedly worse on 2/12/24 MRI with 2/13/24 MRI showing extensive resection of right frontal enhancement, stable on 3/19/24 MRI  No progressive tumor or edema on 2/27 CT head

## 2024-04-11 NOTE — HISTORY OF PRESENT ILLNESS
[FreeTextEntry1] : NEURO-ONCOLOGY  This 58 year old right handed man is in follow up regarding glioblastoma  He presented in 12/23 with headaches and memory loss, with confusion.  He developed left facial weakness.  He was found to have a right frontal and insular cystic and enhancing lesion. CTC AP was unrevealing.  Tumor was subtotally resected by Dr. Deleon on 12/29/23, revealing glioblastoma, IDH wt, MGMT methylated, Caris with EGFR amplification, TERT, RONA and MDM2 mutations, TMB 2.  He started chemoRT, but was admitted with massive progression and had resection of right frontal tumor on 2/14/24.  Completed RT/TMZ on 4/2/24.   INTERVAL HISTORY:  No headaches.  Feels well.  No seizures.   Completed RT.  PMH:  He had a number of cutaneous melanomas removed, but without systemic therapy or invasion, last of these was 12 mo ago.  PSH: as above. orthopedic surgery on shoulder. SHX:  Here with daughter Erica, not present is son Fabiano (a podiatrist) and ex wife Tammi. 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 Nonsmoker. nondrinker FHX:  no brain tumors

## 2024-04-25 ENCOUNTER — OUTPATIENT (OUTPATIENT)
Dept: OUTPATIENT SERVICES | Facility: HOSPITAL | Age: 59
LOS: 1 days | End: 2024-04-25

## 2024-04-25 ENCOUNTER — APPOINTMENT (OUTPATIENT)
Dept: MRI IMAGING | Facility: CLINIC | Age: 59
End: 2024-04-25
Payer: COMMERCIAL

## 2024-04-25 ENCOUNTER — APPOINTMENT (OUTPATIENT)
Dept: CT IMAGING | Facility: CLINIC | Age: 59
End: 2024-04-25
Payer: COMMERCIAL

## 2024-04-25 DIAGNOSIS — C71.9 MALIGNANT NEOPLASM OF BRAIN, UNSPECIFIED: ICD-10-CM

## 2024-04-25 DIAGNOSIS — Z98.890 OTHER SPECIFIED POSTPROCEDURAL STATES: Chronic | ICD-10-CM

## 2024-04-25 PROCEDURE — 70450 CT HEAD/BRAIN W/O DYE: CPT | Mod: 26

## 2024-04-25 PROCEDURE — 70553 MRI BRAIN STEM W/O & W/DYE: CPT | Mod: 26

## 2024-04-29 ENCOUNTER — RX RENEWAL (OUTPATIENT)
Age: 59
End: 2024-04-29

## 2024-05-01 ENCOUNTER — APPOINTMENT (OUTPATIENT)
Dept: NEUROSURGERY | Facility: CLINIC | Age: 59
End: 2024-05-01
Payer: COMMERCIAL

## 2024-05-01 ENCOUNTER — APPOINTMENT (OUTPATIENT)
Dept: NEUROLOGY | Facility: CLINIC | Age: 59
End: 2024-05-01
Payer: SUBSIDIZED

## 2024-05-01 VITALS
TEMPERATURE: 98 F | HEART RATE: 76 BPM | HEIGHT: 70 IN | SYSTOLIC BLOOD PRESSURE: 124 MMHG | DIASTOLIC BLOOD PRESSURE: 88 MMHG | RESPIRATION RATE: 16 BRPM | WEIGHT: 198 LBS | OXYGEN SATURATION: 97 % | BODY MASS INDEX: 28.35 KG/M2

## 2024-05-01 PROCEDURE — 99215 OFFICE O/P EST HI 40 MIN: CPT

## 2024-05-01 PROCEDURE — 99213 OFFICE O/P EST LOW 20 MIN: CPT | Mod: 24

## 2024-05-01 RX ORDER — TEMOZOLOMIDE 5 MG/1
5 CAPSULE ORAL
Qty: 15 | Refills: 0 | Status: ACTIVE | COMMUNITY
Start: 2024-05-01 | End: 1900-01-01

## 2024-05-01 RX ORDER — TEMOZOLOMIDE 100 MG/1
100 CAPSULE ORAL
Qty: 15 | Refills: 0 | Status: ACTIVE | COMMUNITY
Start: 2024-05-01 | End: 1900-01-01

## 2024-05-01 RX ORDER — CEPHALEXIN 500 MG/1
500 CAPSULE ORAL 4 TIMES DAILY
Qty: 28 | Refills: 0 | Status: DISCONTINUED | COMMUNITY
Start: 2024-01-20 | End: 2024-05-01

## 2024-05-01 NOTE — PHYSICAL EXAM
[FreeTextEntry1] : Exam as below (with documented exceptions in parentheses):  General:  well appearing man KPS is 80  Mental Status:  Awake, alert and attentive. Oriented to person, place and time. Recent and remote memory intact. Normal concentration. Fluent spontaneous speech with intact naming and repetition. Normal fund of knowledge.    Cranial Nerves: II: Full visual fields. III,IV,VI:Pupils round, reactive to light. Full extraocular movements. No nystagmus. V: Normal bilateral bite, facial sensation. VII: No facial weakness. VIII: Hearing intact. IX,X: Palate midline, intact gag. XI:  Sternocleidomastoids normal. XII: Tongue protrudes midline. No dysarthria.   Motor:  Normal tone, bulk and power throughout including arms and legs, proximal and distal. No pronator drift. No abnormal movements.   Sensation: Normal in arms, legs and trunk to pin, proprioception and vibration. Negative Romberg.   Coordination: No dysmetria or dysdiadochokinesis bilaterally. Normal heel-shin testing.   Gait: Normal including heel and toe walking. Normal station.  (walks normally without assistance)  Reflexes: Normoactive and symmetric throughout. Absent Babinski bilaterally.   Vascular: No peripheral edema/calf tenderness.   Eyes: Funduscopic exam without papilledema (mild disc edema OU)   Heart: Regular rate and rhythm. Normal s1-s2. No murmurs, rubs or gallops.   Respiratory: Lungs clear to auscultation bilaterally.   Abdomen: Nontender, non-distended. No hepatosplenomegaly. Normal bowel sounds in four quadrants.

## 2024-05-01 NOTE — DISCUSSION/SUMMARY
[FreeTextEntry1] : Glioblastoma, MGMT methylated.  ChemoRT interrupted by early POD, s/p resection.  Completed chemoRT.   Plan for treatment on [facility] protocol.   Dex will remain 2 daily Keppra 1000 bid for now, taper in future Plan for adjuvant /m2 on protocol with ultrasound  Plan to start chemo 5/14 per protocol

## 2024-05-01 NOTE — REASON FOR VISIT
[New Patient Visit] : a new patient visit [Referred By: _________] : Patient was referred by LEONORA [Family Member] : family member

## 2024-05-01 NOTE — HISTORY OF PRESENT ILLNESS
[FreeTextEntry1] : NEURO-ONCOLOGY  This 58 year old right handed man is in follow up regarding glioblastoma  He presented in 12/23 with headaches and memory loss, with confusion.  He developed left facial weakness.  He was found to have a right frontal and insular cystic and enhancing lesion. CTC AP was unrevealing.  Tumor was subtotally resected by Dr. Deleon on 12/29/23, revealing glioblastoma, IDH wt, MGMT methylated, Caris with EGFR amplification, TERT, RONA and MDM2 mutations, TMB 2.  He started chemoRT, but was admitted with massive progression and had resection of right frontal tumor on 2/14/24.  Completed RT/TMZ on 4/2/24.   INTERVAL HISTORY:  No headaches.  Feels well.  No seizures.   Notes blurred vision, and some diplopia in AM.  MRI reviewed showing no new enhancement and stable FLAIR.   PMH:  He had a number of cutaneous melanomas removed, but without systemic therapy or invasion, last of these was 12 mo ago.  PSH: as above. orthopedic surgery on shoulder. SHX:  Here with daughter Erica, not present is son Fabiano (a podiatrist) and ex wife Tammi. 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 nondrinker FHX:  no brain tumors

## 2024-05-01 NOTE — DATA REVIEWED
[de-identified] : Reviewed pre and post operative scans from 12/23 showing subtotal resection of cystic enhancing right frontal and insular lesion, there are satellite foci of enhancement that remain, markedly worse on 2/12/24 MRI with 2/13/24 MRI showing extensive resection of right frontal enhancement, stable on 3/19/24 MRI, 4/24 MRI with stable enhancement and FLAIR  No progressive tumor or edema on 2/27 CT head

## 2024-05-02 ENCOUNTER — APPOINTMENT (OUTPATIENT)
Dept: NEUROLOGY | Facility: CLINIC | Age: 59
End: 2024-05-02

## 2024-05-02 ENCOUNTER — APPOINTMENT (OUTPATIENT)
Dept: MRI IMAGING | Facility: CLINIC | Age: 59
End: 2024-05-02

## 2024-05-02 RX ORDER — ONDANSETRON 4 MG/1
4 TABLET, ORALLY DISINTEGRATING ORAL
Qty: 8 | Refills: 0 | Status: ACTIVE | COMMUNITY
Start: 2024-05-02 | End: 1900-01-01

## 2024-05-02 NOTE — PHYSICAL THERAPY INITIAL EVALUATION ADULT - PASSIVE RANGE OF MOTION EXAMINATION, REHAB EVAL
Donna Oliveira comes into clinic today for urethral stenosis at the request of MEET Griffiths, Ordering Provider for urethral dilation and Urethral Suppository.    Patient's urethra was cleansed with an antiseptic towelette. Urethra was slowly dilated with well-lubricated 24, 26 and 28 Persian sounds. Patient tolerated procedure well.      Patient also requested assistance with her hydrocortisone 1% urethral suppository cream, as she recently broke her leg and has trouble positioning herself as home.  Pt brought her prefilled syringes with her today.  After dilation, 1 syringe of hydrocortisone cream was instilled into urethra.        This service provided today was under the supervising provider of the day Dr. Rueda, who was available if needed.    Charleen Samuel, EMT    
no Passive ROM deficits were identified

## 2024-05-06 ENCOUNTER — APPOINTMENT (OUTPATIENT)
Dept: RADIATION ONCOLOGY | Facility: CLINIC | Age: 59
End: 2024-05-06
Payer: COMMERCIAL

## 2024-05-06 ENCOUNTER — EMERGENCY (EMERGENCY)
Facility: HOSPITAL | Age: 59
LOS: 1 days | End: 2024-05-06
Attending: EMERGENCY MEDICINE
Payer: COMMERCIAL

## 2024-05-06 ENCOUNTER — NON-APPOINTMENT (OUTPATIENT)
Age: 59
End: 2024-05-06

## 2024-05-06 VITALS
WEIGHT: 202.83 LBS | DIASTOLIC BLOOD PRESSURE: 94 MMHG | SYSTOLIC BLOOD PRESSURE: 139 MMHG | HEIGHT: 70 IN | RESPIRATION RATE: 20 BRPM | OXYGEN SATURATION: 98 % | TEMPERATURE: 98 F | HEART RATE: 89 BPM

## 2024-05-06 VITALS
BODY MASS INDEX: 29.35 KG/M2 | HEIGHT: 70 IN | OXYGEN SATURATION: 97 % | RESPIRATION RATE: 16 BRPM | HEART RATE: 98 BPM | DIASTOLIC BLOOD PRESSURE: 95 MMHG | SYSTOLIC BLOOD PRESSURE: 148 MMHG | WEIGHT: 205 LBS | TEMPERATURE: 97.9 F

## 2024-05-06 DIAGNOSIS — Z98.890 OTHER SPECIFIED POSTPROCEDURAL STATES: Chronic | ICD-10-CM

## 2024-05-06 LAB
ALBUMIN SERPL ELPH-MCNC: 4.3 G/DL
ALP BLD-CCNC: 100 U/L
ALT SERPL-CCNC: 55 U/L
ANION GAP SERPL CALC-SCNC: 21 MMOL/L
AST SERPL-CCNC: 30 U/L
BILIRUB SERPL-MCNC: 0.4 MG/DL
BUN SERPL-MCNC: 9 MG/DL
CALCIUM SERPL-MCNC: 9.3 MG/DL
CHLORIDE SERPL-SCNC: 101 MMOL/L
CO2 SERPL-SCNC: 18 MMOL/L
CREAT SERPL-MCNC: 0.7 MG/DL
EGFR: 106 ML/MIN/1.73M2
GLUCOSE SERPL-MCNC: 80 MG/DL
LDH SERPL-CCNC: 420 U/L
MAGNESIUM SERPL-MCNC: 2.2 MG/DL
PHOSPHATE SERPL-MCNC: 4 MG/DL
POTASSIUM SERPL-SCNC: 4.2 MMOL/L
PROT SERPL-MCNC: 7 G/DL
SODIUM SERPL-SCNC: 140 MMOL/L

## 2024-05-06 PROCEDURE — 99284 EMERGENCY DEPT VISIT MOD MDM: CPT | Mod: 25

## 2024-05-06 PROCEDURE — 99024 POSTOP FOLLOW-UP VISIT: CPT

## 2024-05-06 PROCEDURE — 99284 EMERGENCY DEPT VISIT MOD MDM: CPT

## 2024-05-06 PROCEDURE — 70450 CT HEAD/BRAIN W/O DYE: CPT | Mod: 26,MC

## 2024-05-06 PROCEDURE — 70450 CT HEAD/BRAIN W/O DYE: CPT | Mod: MC

## 2024-05-06 NOTE — ED PROVIDER NOTE - OBJECTIVE STATEMENT
58M with PMHx of HTN, HLD, melanoma x3, glioblastoma with redo R crani for tumor resection on 2/14/2024, on temozolomide and XRT (started 2/5) presented due to persistent headache behind R eye that woke him up from sleep at around 4:30 AM today as well as blurry vision. The headache did not resolve despite taking around 975 mg of tylenol and held off from taking prescribed oxycodone. Patient reports that headache resolved at around 11:15 AM today but continues to have blurry vision. Patient also reports new "bump" on R crani scar that he first noticed around 2 days ago. Reports adherence to medications.

## 2024-05-06 NOTE — ED PROVIDER NOTE - CLINICAL SUMMARY MEDICAL DECISION MAKING FREE TEXT BOX
ASSESSMENT: 58M with PMHx of GBM, HTN, HLD, s/p redo R crani presenting due to headache behind R eye that woke him from sleep at around 4:30 AM. Headache has now resolved but patient continues to report blurry vision.     PLAN:  - CT head: Encephalomalacia and gliosis in the RIGHT frontal lobe. Overlying craniotomy with post stable postsurgical defects.  - Neurological exam with no focal deficits  - Neurosurgery consult placed   - Follow up outpatient with Dr. Deleon ASSESSMENT: 58M with PMHx of GBM, HTN, HLD, s/p redo R crani presenting due to headache behind R eye that woke him from sleep at around 4:30 AM. Headache has now resolved but patient continues to report blurry vision.     PLAN:  - Patient not currently reporting any pain so not requiring pain medications  - CT head: Encephalomalacia and gliosis in the RIGHT frontal lobe. Overlying craniotomy with post stable postsurgical defects.  - Neurological exam with no focal deficits  - Neurosurgery consult placed, deemed stable for discharge with no interventions indicated  - Follow up outpatient with Dr. Deleon  - Follow up outpatient with neurologist

## 2024-05-06 NOTE — HISTORY OF PRESENT ILLNESS
[FreeTextEntry1] : 58 year old man with high-grade astrocytoma (WHO grade 4, IDH wt), s/p STR on 12/29/23 completing Radiation 4/3/24. Interval history:  4/25/24 Ct head: IMPRESSION: No significant change since 3/19/2024. Right frontal postoperative changes and/or residual neoplasm subjacent to craniotomy defect. MRI head: IMPRESSION: Right frontal resection changes with grossly stable surrounding FLAIR signal abnormality and enhancing nodularity posteriorly. No evidence for progression of disease.   He Spoke with Joann Barthelemy 5/1/24 for enrollment in the  [employer] Phase 1 Multi-center clinical Trial Evaluating the Safety and Tolerability of 5-aminolevulinic Acid (5-ALA) Combined with CV01 Delivery of Ultrasound for Sonodynamic Therapy (SDT) in Patients With newly diagnosed glioblastoma tentative start date 5/7/24 as per son.  Reports he was awoken from sleep this am with a severe headache over his right eye with a 2 day history or a soft mass in his scar line, ongoing blurry vision and fatigue. No nausea, vomiting, diplopia or weight loss Decadron dose: 2mg daily, Keppra dose 1000mg 2 times per day and he is scheduled to resume Temodar 5/14/24.   Care team: ROBIN Deleon Neuro Onc Anatoly Calix PCP Tawanda Case

## 2024-05-06 NOTE — VITALS
[Maximal Pain Intensity: 6/10] : 6/10 [Least Pain Intensity: 6/10] : 6/10 [Pain Description/Quality: ___] : Pain description/quality: [unfilled] [Pain Duration: ___] : Pain duration: [unfilled] [Pain Location: ___] : Pain Location: [unfilled] [Pain Interferes with ADLs] : Pain interferes with activities of daily living. [OTC] : OTC [80: Normal activity with effort; some signs or symptoms of disease.] : 80: Normal activity with effort; some signs or symptoms of disease.

## 2024-05-06 NOTE — ED PROVIDER NOTE - CRANIAL NERVE AND PUPILLARY EXAM
gag reflex deferred, gait not tested/cranial nerves 2-12 intact/central vision intact/peripheral vision intact/tongue is midline

## 2024-05-06 NOTE — ED ADULT NURSE NOTE - OBJECTIVE STATEMENT
Pt states he had a headache since 0430 this morning but as soon as he got to the ED it went away. Denies fevers, chills, cp, sob, N/V/D

## 2024-05-06 NOTE — REVIEW OF SYSTEMS
[Fatigue] : fatigue [Visual Disturbances] : visual disturbances [Negative] : Allergic/Immunologic [Nausea: Grade 0] : Nausea: Grade 0 [Vomiting: Grade 0] : Vomiting: Grade 0 [Edema Limbs: Grade 0] : Edema Limbs: Grade 0  [Fatigue: Grade 1 - Fatigue relieved by rest] : Fatigue: Grade 1 - Fatigue relieved by rest [Localized Edema: Grade 0] : Localized Edema: Grade 0  [Neck Edema: Grade 0] : Neck Edema: Grade 0 [Blurred Vision: Grade 1 - Intervention not indicated] : Blurred Vision: Grade 1 - Intervention not indicated [Headache: Grade 2 - Moderate pain; limiting instrumental ADL] : Headache: Grade 2 - Moderate pain; limiting instrumental ADL [Alopecia: Grade 1 - Hair loss of <50% of normal for that individual that is not obvious from a distance but only on close inspection; a different hair style may be required to cover the hair loss but it does not require a wig or hair piece to camouflage] : Alopecia: Grade 1 - Hair loss of <50% of normal for that individual that is not obvious from a distance but only on close inspection; a different hair style may be required to cover the hair loss but it does not require a wig or hair piece to camouflage [de-identified] : soft mass right frontal head

## 2024-05-06 NOTE — ED PROVIDER NOTE - ATTENDING CONTRIBUTION TO CARE
I, Christoph Berrios MD, performed the initial face to face bedside interview with this patient regarding history of present illness, review of symptoms and relevant past medical, social and family history.  I completed an independent physical examination.  I was the initial provider who evaluated this patient. I have signed out the follow up of any pending tests (i.e. labs, radiological studies) to the resident.  I have communicated the patient’s plan of care and disposition with the resident.    headache, PE unremarkable, neurosurgery assessment noted; patient feels comfortable with discharge and medical plan; PMD or clinic follow up recommended for reassessment. Patient is aware of signs/symptoms to return to the emergency department.

## 2024-05-06 NOTE — ED ADULT TRIAGE NOTE - CHIEF COMPLAINT QUOTE
Pt arrives to ED c/o HA not controlled with Tylenol - Hx of glioblastoma  with two resections , last one in April 14 c

## 2024-05-06 NOTE — CONSULT NOTE ADULT - SUBJECTIVE AND OBJECTIVE BOX
Patient is a 58y old  Male who presents with a chief complaint of   HPI: 58 year old male with past medical history of HTN, HLD, melanoma x 3, last resection 1 year ago, well-known to our service with hx of admission 2023 s/p right craniotomy for tumor debulking 23 with Dr. Deleon with pathology consistent with glioblastoma, started on temozolomide for chemotherapy and started radiation therapy on . Pt presented to [facility] on 24 for worsening headache, N/V with concern of recurrent frontotemporal brain mass on CTH. Underwent redo R crani for tumor resection on 24. Patient presents today due to worsening headaches and pressure behind his eyes over the last 1-2 weeks. In ED today c/o headche. Took Tylenol without relief. Has been compliant with Keppra and Decadron at home. Pt went to appt at [facility] today for radiation treatment and was told to come to ED due to similar symptoms in March. CTH done today demonstrates some R frontotemporal encephalomalacia and gliosis. Patient currently feels well, states headache/eye pressure has resolved.     PAST MEDICAL & SURGICAL HISTORY:  Melanoma  Hypertension  HLD (hyperlipidemia)  GBM (glioblastoma multiforme)  S/P rotator cuff repair  S/P craniotomy    FAMILY HISTORY:  No pertinent family history in first degree relatives    Allergies  No Known Allergies  Intolerances    REVIEW OF SYSTEMS  As noted in HPI    HOME MEDICATIONS:  Home Medications:  acetaminophen 325 mg oral tablet: 2 tab(s) orally every 6 hours As needed Temp greater or equal to 38C (100.4F), Mild Pain (1 - 3) (2024 10:12)  Lexapro 10 mg oral tablet: 1 tab(s) orally once a day (2024 09:16)  Zofran ODT 8 mg oral tablet, disintegratin tab(s) orally prn (2024 09:15)    Vital Signs Last 24 Hrs  T(C): 36.7 (06 May 2024 10:16), Max: 36.7 (06 May 2024 10:16)  T(F): 98 (06 May 2024 10:16), Max: 98 (06 May 2024 10:16)  HR: 89 (06 May 2024 10:16) (89 - 89)  BP: 139/94 (06 May 2024 10:16) (139/94 - 139/94)  BP(mean): --  RR: 20 (06 May 2024 10:16) (20 - 20)  SpO2: 98% (06 May 2024 10:16) (98% - 98%)    Parameters below as of 06 May 2024 10:16  Patient On (Oxygen Delivery Method): room air    PHYSICAL EXAM:  GENERAL: NAD, well-groomed  HEAD:  Atraumatic, normocephalic  WOUND: Dressing clean dry intact; well healed  CECILY COMA SCORE: E- V- M- = 15  MENTAL STATUS: AAO x3; Awake; Opens eyes spontaneously; Appropriately conversant without aphasia; following simple commands  CRANIAL NERVES: PERRL. EOMI without nystagmus. Face symmetric w/ normal eye closure and smile, tongue midline. Hearing grossly intact. Speech clear. Head turning and shoulder shrug intact.   MOTOR: strength 5/5 b/l upper and lower extremities  SENSATION: grossly intact to light touch all extremities  SKIN: Warm, dry    RADIOLOGY & ADDITIONAL STUDIES:    < from: MR Head w/wo IV Cont Disc (24 @ 09:58) >  IMPRESSION:  Right frontal resection changes with grossly stable surrounding FLAIR   signal abnormality and enhancing nodularity posteriorly. No evidence for   progression of disease.    < from: CT Head No Cont (24 @ 10:52) >  IMPRESSION:   Encephalomalacia and gliosis in the RIGHT frontal lobe.   Overlying craniotomy with post stable postsurgical defects.

## 2024-05-06 NOTE — ED PROVIDER NOTE - CARE PROVIDER_API CALL
Geremias Meza  Neurology  [address] [place] [address], [facility] [address]  [place] [address]  Phone: [phone]  Fax: [phone]  Follow Up Time:

## 2024-05-06 NOTE — ED PROVIDER NOTE - CONSTITUTIONAL, MLM
normal... Well appearing, pleasant, awake, alert, oriented to person, place, time/situation and in no apparent distress.

## 2024-05-06 NOTE — PHYSICAL EXAM
[Cervical Lymph Nodes Enlarged Posterior Bilaterally] : posterior cervical [Cervical Lymph Nodes Enlarged Anterior Bilaterally] : anterior cervical [Normal] : oriented to person, place and time, the affect was normal, the mood was normal and not anxious [de-identified] : soft mass right frontal head

## 2024-05-06 NOTE — ED PROVIDER NOTE - NSFOLLOWUPINSTRUCTIONS_ED_ALL_ED_FT
What is an acute headache? An acute headache is pain or discomfort that may start suddenly and get worse quickly. You may have an acute headache only when you feel stress or eat certain foods. Other acute headache pain can happen every day, and sometimes several times a day.    What are the most common types of acute headache?    Tension headache is the most common type of headache. These headaches typically occur in the late afternoon and go away by evening. The pain is usually mild or moderate. You may have problems tolerating bright light or loud noise. The pain is usually across the forehead or in the back of the head, often only on one side. These headaches may occur every day.    Migraine headaches cause moderate or severe pain. The headache generally lasts from 1 to 3 days and tends to come back. Pain is usually on only one side, but it may change sides. Migraines often occur in the temple, the back of the head, or behind the eye. The pain may throb or be sharp and steady.    A migraine with aura means you see or feel something before a migraine. You may see a small spot surrounded by bright zigzag lines. Other signs or symptoms may follow the aura.    Cluster headache pain is usually only on one side. It often causes severe pain, and can last for 30 minutes to 2 hours. These headaches may occur 1 or 2 times each day, more often at night. The pain may wake you.  Headache Types  What causes acute headaches? The cause of your headache may not be known. The following can trigger a headache:    Stress or tension, hours or even days after stressful events    Fatigue, a lack of sleep or changes in your usual sleep pattern, or a nap during the day    Menstruation, especially after pregnancy, or use of birth control pills or hormone replacement therapy    Food such as cured meats, artificial sweeteners, alcohol, dark chocolate, and MSG    Suddenly not having caffeine if you usually have larger amounts    A medical problem, such as an infection, tooth pain, neck or sinus pain, thyroid problems, or a tumor    A head injury  How is the type of acute headache diagnosed and treated? Your healthcare provider will ask you to describe your pain and rate it on a scale from 1 to 10. Tell the provider how often you have headaches and how long they last. Also describe any other symptoms you have along with headaches, such as dizziness or blurred vision. You may need tests including a CT scan to make sure there is not a leak in any blood vessels.    Medicines may be given to manage or prevent headaches. The medicine will depend on the type of acute headache you have. Do not wait until the pain is severe before you take your medicine. You may be able to take over-the-counter pain medicines as needed. Examples include NSAIDs and acetaminophen. Ask your healthcare provider which medicine is right for you. Ask how much to take and when to take it. Follow directions. These medicines can cause stomach bleeding or kidney or liver damage if not taken correctly.    Biofeedback may be used to help you manage stress. Electrodes (wires) are placed on your body and attached to a monitor. You will learn how to change stress reactions. For example, you learn to slow your heart rate when you become upset.    Cognitive behavior therapy, or stress management, may be used with other therapies to prevent headaches.  What can I do to manage my symptoms?    Apply heat or ice on the headache area. Use a heat or ice pack. For an ice pack, you can also put crushed ice in a plastic bag. Cover the pack or bag with a towel before you apply it to your skin. Ice and heat both help decrease pain, and heat also helps decrease muscle spasms. Apply heat for 20 to 30 minutes every 2 hours. Apply ice for 15 to 20 minutes every hour. Apply heat or ice for as long and for as many days as directed. You may alternate heat and ice.    Relax your muscles. Lie down in a comfortable position and close your eyes. Relax your muscles slowly. Start at your toes and work your way up your body.    Keep a record of your headaches. Write down when your headaches start and stop. Include your symptoms and what you were doing when the headache began. Record what you ate or drank for 24 hours before the headache started. Describe the pain and where it hurts. Keep track of what you did to treat your headache and if it worked.  What can I do to prevent an acute headache?    Avoid anything that triggers an acute headache. Examples include exposure to chemicals, going to high altitude, or not getting enough sleep. Create a regular sleep routine. Go to sleep at the same time and wake up at the same time each day. Do not use electronic devices before bedtime. These may trigger a headache or prevent you from sleeping well.    Do not smoke. Nicotine and other chemicals in cigarettes and cigars can trigger an acute headache or make it worse. Ask your healthcare provider for information if you currently smoke and need help to quit. E-cigarettes or smokeless tobacco still contain nicotine. Talk to your healthcare provider before you use these products.    Limit alcohol as directed. Alcohol can trigger an acute headache or make it worse. If you have cluster headaches, do not drink alcohol during an episode. For other types of headaches, ask your healthcare provider if it is safe for you to drink alcohol. Ask how much is safe for you to drink, and how often.    Exercise as directed. Exercise can reduce tension and help with headache pain. Aim for 30 minutes of physical activity on most days of the week. Your healthcare provider can help you create an exercise plan.    Eat a variety of healthy foods. Healthy foods include fruits, vegetables, low-fat dairy products, lean meats, fish, whole grains, and cooked beans. Your healthcare provider or dietitian can help you create meals plans if you need to avoid foods that trigger headaches.  When should I seek immediate care?    You have severe pain.    You have numbness or weakness on one side of your face or body.    You have a headache that occurs after a blow to the head, a fall, or other trauma.    You have a headache, are forgetful or confused, or have trouble speaking.    You have a headache, stiff neck, and a fever.  When should I call my doctor?    You have a constant headache and are vomiting.    You have a headache each day that does not get better, even after treatment.    You have changes in your headaches, or new symptoms that occur when you have a headache.    You have questions or concerns about your condition or care.

## 2024-05-06 NOTE — CONSULT NOTE ADULT - ASSESSMENT
58M PMHx of HTN, HLD, melanoma x 3, last resection 1 year ago, well-known to our service with hx of admission 12/2023 s/p right craniotomy for tumor debulking 12/29/23 with Dr. Deleon with pathology consistent with GBM with rapid regrowth and redo R crani for recurrent tumor on 2/13/24. Pt presents today due to worsening headaches and double vision.    Plan:  - CTH demonstrates encephalomalacia and gliosis in the RIGHT frontal lobe, overall stable post op changes  - MRI w/wo with stable post-op findings and treatment related changes  - No role for acute neurosurgical intervention  - Recommend pain management  - Follow up at scheduled outpatient appointment with Dr. Deleon  - Discussed with Dr. Timmons   58M PMHx of HTN, HLD, melanoma x 3, last resection 1 year ago, well-known to our service with hx of admission 12/2023 s/p right craniotomy for tumor debulking 12/29/23 with Dr. Deleon with pathology consistent with GBM with rapid regrowth and redo R crani for recurrent tumor on 2/13/24. Pt presents today due to worsening headaches and double vision.    Plan:  - CTH demonstrates encephalomalacia and gliosis in the RIGHT frontal lobe, overall stable post op changes  - MRI w/wo with stable post-op findings and treatment related changes  - No role for acute neurosurgical intervention  - Recommend pain management  - Follow up at scheduled outpatient appointment with Dr. Deleon  - Neurosurgery to sign off  - Discussed with Dr. Ventura

## 2024-05-06 NOTE — ED PROVIDER NOTE - PATIENT PORTAL LINK FT
You can access the FollowMyHealth Patient Portal offered by [facility] by registering at the following website: [email or web address]. By joining [employer]’s FollowMyHealth portal, you will also be able to view your health information using other applications (apps) compatible with our system.

## 2024-05-06 NOTE — ED ADULT TRIAGE NOTE - INTERNATIONAL TRAVEL
No Bed in lowest position, wheels locked, appropriate side rails in place/Call bell, personal items and telephone in reach/Instruct patient to call for assistance before getting out of bed or chair/Non-slip footwear when patient is out of bed/Kim to call system/Physically safe environment - no spills, clutter or unnecessary equipment/Purposeful Proactive Rounding/Room/bathroom lighting operational, light cord in reach

## 2024-05-07 ENCOUNTER — APPOINTMENT (OUTPATIENT)
Dept: NEUROSURGERY | Facility: CLINIC | Age: 59
End: 2024-05-07

## 2024-05-08 ENCOUNTER — APPOINTMENT (OUTPATIENT)
Dept: NEUROSURGERY | Facility: CLINIC | Age: 59
End: 2024-05-08

## 2024-05-08 NOTE — REVIEW OF SYSTEMS
[As Noted in HPI] : as noted in HPI [Negative] : Integumentary [FreeTextEntry3] : blurry vision b/l left eye > right eye. double vision in AM

## 2024-05-08 NOTE — REASON FOR VISIT
[Follow-Up: _____] : a [unfilled] follow-up visit [Family Member] : family member [FreeTextEntry1] : Fermín Harmon is a 58-year-old right-handed male with a past medical history of HLD and melanoma resection x3. On 12/23/24 he presented with confusion, forgetfulness, right facial droop. Tumor was biopsied and debulked by Dr. Deleon on 12/29/23, revealing glioblastoma, IDH wt, MGMT methylated. Prior to initiation of chem/RT underwent a right frontal craniotomy on 2/14/24 without progression of disease. Completed RT/TMZ on 4/3/24.   He consented to participate in the Alpheus trial on 5/1/2024  Today 05/6/2024 C1 D1 he reports feeling well. No changes since last visit. NPO after midnight  0640-Pre 5ALA VS /90, HR 73 RR 16, Temp 98.1 Height 5'10 Weight 205   Exam 0640- A+OX4, PERRL EOMI, speech clear, face symmetrical, WALKER 5/5, SILT, gait steady without assistive device, word recall 3/3 but reports some difficulty remembering details i.e. timing of appointments etc. (unchanged)  skin and scalp intact.  KPS 80 ECOG 1  0643-5ALA  1300- Pretreatment   /95, HR 90 98.1, RR 16 Neuro exam unchanged. KPS 80 ECOG 1  1625- immediate posttreatment  /92 HR 78 RR 16 Temp 97.7 Neuro exam unchanged KPS 80 ECOG 1  1725- 1-hour posttreatment  VS:128/90, HR 79, RR 16, 98.6 Neuro exam unchanged KPS 80 ECOG 1 pt tolerated treatment well.

## 2024-05-08 NOTE — PHYSICAL EXAM
[General Appearance - In No Acute Distress] : in no acute distress [General Appearance - Alert] : alert [General Appearance - Well Nourished] : well nourished [Oriented To Time, Place, And Person] : oriented to person, place, and time [Motor Strength] : muscle strength was normal in all four extremities [Motor Handedness Right-Handed] : the patient is right hand dominant [Sensation Tactile Decrease] : light touch was intact [Balance] : balance was intact [PERRL With Normal Accommodation] : pupils were equal in size, round, reactive to light, with normal accommodation [Extraocular Movements] : extraocular movements were intact [] : no respiratory distress [Abnormal Walk] : normal gait [Involuntary Movements] : no involuntary movements were seen [Motor Tone] : muscle strength and tone were normal [Skin Color & Pigmentation] : normal skin color and pigmentation [Romberg's Sign] : Romberg's sign was negtive

## 2024-05-08 NOTE — ASSESSMENT
[FreeTextEntry1] : Mr. Kovacs is a 58-year-old male s/p subtotal resection of a right frontal lesion on 12/29/23- Path GBM, IDH wt, MGMT methylated. On 2/14/2024 underwent a second resection. He completed RT/TMZ on 4/3/2024.  Discussion: Avoid bright light for 24 hours Labs as per protocol C1D3 office visit on 5/9/24 Considered consulting PCP regarding BP. (pt was previously on HCTZ and dc'd while during hospitalization)  Contact office with any questions or concerns.

## 2024-05-09 ENCOUNTER — RESULT REVIEW (OUTPATIENT)
Age: 59
End: 2024-05-09

## 2024-05-09 ENCOUNTER — OUTPATIENT (OUTPATIENT)
Dept: OUTPATIENT SERVICES | Facility: HOSPITAL | Age: 59
LOS: 1 days | Discharge: HOME | End: 2024-05-09

## 2024-05-09 ENCOUNTER — LABORATORY RESULT (OUTPATIENT)
Age: 59
End: 2024-05-09

## 2024-05-09 ENCOUNTER — APPOINTMENT (OUTPATIENT)
Dept: HEMATOLOGY ONCOLOGY | Facility: CLINIC | Age: 59
End: 2024-05-09

## 2024-05-09 ENCOUNTER — APPOINTMENT (OUTPATIENT)
Dept: NEUROSURGERY | Facility: CLINIC | Age: 59
End: 2024-05-09
Payer: SUBSIDIZED

## 2024-05-09 VITALS
TEMPERATURE: 97.8 F | WEIGHT: 205 LBS | RESPIRATION RATE: 16 BRPM | HEIGHT: 70 IN | DIASTOLIC BLOOD PRESSURE: 88 MMHG | BODY MASS INDEX: 29.35 KG/M2 | HEART RATE: 80 BPM | OXYGEN SATURATION: 97 % | SYSTOLIC BLOOD PRESSURE: 133 MMHG

## 2024-05-09 DIAGNOSIS — Z98.890 OTHER SPECIFIED POSTPROCEDURAL STATES: Chronic | ICD-10-CM

## 2024-05-09 DIAGNOSIS — D49.6 NEOPLASM OF UNSPECIFIED BEHAVIOR OF BRAIN: ICD-10-CM

## 2024-05-09 LAB
BASOPHILS # BLD AUTO: 0.03 K/UL — SIGNIFICANT CHANGE UP (ref 0–0.2)
BASOPHILS NFR BLD AUTO: 0.4 % — SIGNIFICANT CHANGE UP (ref 0–2)
EOSINOPHIL # BLD AUTO: 0 K/UL — SIGNIFICANT CHANGE UP (ref 0–0.5)
EOSINOPHIL NFR BLD AUTO: 0 % — SIGNIFICANT CHANGE UP (ref 0–6)
HCT VFR BLD CALC: 43.9 % — SIGNIFICANT CHANGE UP (ref 39–50)
HGB BLD-MCNC: 15.5 G/DL — SIGNIFICANT CHANGE UP (ref 13–17)
IMM GRANULOCYTES NFR BLD AUTO: 0.9 % — SIGNIFICANT CHANGE UP (ref 0–0.9)
LYMPHOCYTES # BLD AUTO: 0.56 K/UL — LOW (ref 1–3.3)
LYMPHOCYTES # BLD AUTO: 7.1 % — LOW (ref 13–44)
MCHC RBC-ENTMCNC: 32.2 PG — SIGNIFICANT CHANGE UP (ref 27–34)
MCHC RBC-ENTMCNC: 35.3 G/DL — SIGNIFICANT CHANGE UP (ref 32–36)
MCV RBC AUTO: 91.1 FL — SIGNIFICANT CHANGE UP (ref 80–100)
MONOCYTES # BLD AUTO: 0.46 K/UL — SIGNIFICANT CHANGE UP (ref 0–0.9)
MONOCYTES NFR BLD AUTO: 5.8 % — SIGNIFICANT CHANGE UP (ref 2–14)
NEUTROPHILS # BLD AUTO: 6.82 K/UL — SIGNIFICANT CHANGE UP (ref 1.8–7.4)
NEUTROPHILS NFR BLD AUTO: 85.8 % — HIGH (ref 43–77)
NRBC # BLD: 0 /100 WBCS — SIGNIFICANT CHANGE UP (ref 0–0)
PLATELET # BLD AUTO: 246 K/UL — SIGNIFICANT CHANGE UP (ref 150–400)
RBC # BLD: 4.82 M/UL — SIGNIFICANT CHANGE UP (ref 4.2–5.8)
RBC # FLD: 13.7 % — SIGNIFICANT CHANGE UP (ref 10.3–14.5)
WBC # BLD: 7.94 K/UL — SIGNIFICANT CHANGE UP (ref 3.8–10.5)
WBC # FLD AUTO: 7.94 K/UL — SIGNIFICANT CHANGE UP (ref 3.8–10.5)

## 2024-05-09 PROCEDURE — 99215 OFFICE O/P EST HI 40 MIN: CPT

## 2024-05-09 NOTE — ASSESSMENT
[FreeTextEntry1] : Mr. Kovacs is a 58-year-old male s/p biopsy and debulking on 12/29/23- Path GBM, IDH wt, MGMT methylated. On 2/14/2024 underwent a craniotomy without progression of disease. He completed RT/TMZ on 4/3/2024.  Today Mr. Kovacs has voluntarily consented to participate in the Phase 1 Multi-center clinical trial evaluating the safety and tolerability of 5- aminolevulinic Acid (5-ALA) combined with CV01 delivery of ultrasound for sonodynamic therapy (SDT) in patient with newly diagnosed GBM.     Reviewed: - Study Protocol - Inclusion/Exclusion Criteria - Time commitment - Lifestyle consideration  - Study activities Discussed risks, benefits and alternatives at length.  Questions encouraged and all questions answered.  Reiterated the objective of trial, lifestyle consideration, time commitment, potential risk and alterative options.  Patient and son have a printed copy of the consent during this discussion to refer to.  C1D1 -5/7/2024 NPO at midnight on 5/6 Take Zofran prior to treatment Labs as per protocol- 5/4 please email results. contact office with any questions or concerns.

## 2024-05-09 NOTE — PHYSICAL EXAM
[General Appearance - Alert] : alert [General Appearance - In No Acute Distress] : in no acute distress [General Appearance - Well Nourished] : well nourished [Oriented To Time, Place, And Person] : oriented to person, place, and time [Motor Strength] : muscle strength was normal in all four extremities [Motor Handedness Right-Handed] : the patient is right hand dominant [Sensation Tactile Decrease] : light touch was intact [Balance] : balance was intact [PERRL With Normal Accommodation] : pupils were equal in size, round, reactive to light, with normal accommodation [Extraocular Movements] : extraocular movements were intact [] : no respiratory distress [Abnormal Walk] : normal gait [Involuntary Movements] : no involuntary movements were seen [Motor Tone] : muscle strength and tone were normal [Skin Color & Pigmentation] : normal skin color and pigmentation [Romberg's Sign] : Romberg's sign was negtive

## 2024-05-09 NOTE — HISTORY OF PRESENT ILLNESS
[FreeTextEntry1] : Alpheus Trial  [de-identified] : Fermín Harmon is a 58-year-old right-handed male with a past medical history of HLD and melanoma resection x3. On 12/23/24 he presented with confusion, forgetfulness, right facial droop. Tumor was biopsied and debulked by Dr. Deleon on 12/29/23, revealing glioblastoma, IDH wt, MGMT methylated. Prior to initiation of chem/RT he underwent a right frontal craniotomy on 2/14/24 without progression of disease. Completed RT/TMZ on 4/3/24.  Today he presents in office with his son to [facility] enrolling in the Alpheus Phase 1 Multi-center clinical Trial Evaluating the Safety and Tolerability of 5-aminolevulinic Acid (5-ALA) Combined with CV01 Delivery of Ultrasound for Sonodynamic Therapy (SDT) in Patients With newly diagnosed glioblastoma clinical trial. (Spoke with son and patient over the phone on 3/21/2024)   Today he reports ongoing blurriness to b/l eyes (L>R), diplopia in the AM that self resolves, and mild left upper ext weakness on extension (ex. fastening seat belt, holding the trash) otherwise he reports feeling well. No difficulties with ADLs.  Neuro exam: A+Ox4, speech clear, face symmetrical, EOMI, PERRL, WALKER Sx4, SILT, gait steady, skin intact. incision well healed. no open areas.  KPS 80 ECOG 1

## 2024-05-09 NOTE — REASON FOR VISIT
[FreeTextEntry1] : Fermín Harmon is a 58-year-old right-handed male with a past medical history of HLD and melanoma resection x3. On 12/23/24 he presented with confusion, forgetfulness, right facial droop. Tumor was biopsied and debulked by Dr. Deleon on 12/29/23, revealing glioblastoma, IDH wt, MGMT methylated. Prior to initiation of chem/RT underwent a right frontal craniotomy on 2/14/24 without progression of disease. Completed RT/TMZ on 4/3/24.   He consented to participate in the Alpheus trial on 5/1/2024 5/7/2024 C1D1  Today 05/9/2024 C1D3 he reports feeling well. No changes since last visit.   Exam - A+OX4, PERRL EOMI, speech clear, face symmetrical, WALKER 5/5, SILT, gait steady without assistive device, word recall 3/3 but reports some difficulty remembering details i.e. timing of appointments etc.  skin and scalp intact.  KPS 80 ECOG 1

## 2024-05-09 NOTE — ASSESSMENT
[FreeTextEntry1] : Mr. Kovacs is a 58-year-old male s/p subtotal resection of a right frontal lesion on 12/29/23- Path GBM, IDH wt, MGMT methylated. On 2/14/2024 underwent a second resection. He completed RT/TMZ on 4/3/2024.  Discussion: Labs as per protocol C1D8 office visit on 5/14/24 Contact office with any questions or concerns.

## 2024-05-14 ENCOUNTER — APPOINTMENT (OUTPATIENT)
Dept: HEMATOLOGY ONCOLOGY | Facility: CLINIC | Age: 59
End: 2024-05-14

## 2024-05-14 ENCOUNTER — RESULT REVIEW (OUTPATIENT)
Age: 59
End: 2024-05-14

## 2024-05-14 ENCOUNTER — APPOINTMENT (OUTPATIENT)
Dept: NEUROLOGY | Facility: CLINIC | Age: 59
End: 2024-05-14
Payer: COMMERCIAL

## 2024-05-14 ENCOUNTER — APPOINTMENT (OUTPATIENT)
Dept: NEUROSURGERY | Facility: CLINIC | Age: 59
End: 2024-05-14
Payer: SUBSIDIZED

## 2024-05-14 VITALS
RESPIRATION RATE: 16 BRPM | DIASTOLIC BLOOD PRESSURE: 80 MMHG | OXYGEN SATURATION: 97 % | WEIGHT: 205 LBS | HEART RATE: 85 BPM | SYSTOLIC BLOOD PRESSURE: 130 MMHG | BODY MASS INDEX: 29.35 KG/M2 | HEIGHT: 70 IN | TEMPERATURE: 98.3 F

## 2024-05-14 LAB
ALBUMIN SERPL ELPH-MCNC: 4.2 G/DL
ALP BLD-CCNC: 108 U/L
ALT SERPL-CCNC: 59 U/L
ANION GAP SERPL CALC-SCNC: 13 MMOL/L
AST SERPL-CCNC: 25 U/L
BASOPHILS # BLD AUTO: 0.05 K/UL — SIGNIFICANT CHANGE UP (ref 0–0.2)
BASOPHILS NFR BLD AUTO: 0.5 % — SIGNIFICANT CHANGE UP (ref 0–2)
BILIRUB SERPL-MCNC: 0.4 MG/DL
BUN SERPL-MCNC: 11 MG/DL
CALCIUM SERPL-MCNC: 9.6 MG/DL
CHLORIDE SERPL-SCNC: 104 MMOL/L
CO2 SERPL-SCNC: 24 MMOL/L
CREAT SERPL-MCNC: 0.72 MG/DL
EGFR: 105 ML/MIN/1.73M2
EOSINOPHIL # BLD AUTO: 0.01 K/UL — SIGNIFICANT CHANGE UP (ref 0–0.5)
EOSINOPHIL NFR BLD AUTO: 0.1 % — SIGNIFICANT CHANGE UP (ref 0–6)
GLUCOSE SERPL-MCNC: 100 MG/DL
HCT VFR BLD CALC: 43.4 % — SIGNIFICANT CHANGE UP (ref 39–50)
HGB BLD-MCNC: 15.3 G/DL — SIGNIFICANT CHANGE UP (ref 13–17)
IMM GRANULOCYTES NFR BLD AUTO: 1.5 % — HIGH (ref 0–0.9)
LDH SERPL-CCNC: 277 U/L
LYMPHOCYTES # BLD AUTO: 0.76 K/UL — LOW (ref 1–3.3)
LYMPHOCYTES # BLD AUTO: 7.4 % — LOW (ref 13–44)
MAGNESIUM SERPL-MCNC: 2.1 MG/DL
MCHC RBC-ENTMCNC: 32.1 PG — SIGNIFICANT CHANGE UP (ref 27–34)
MCHC RBC-ENTMCNC: 35.3 G/DL — SIGNIFICANT CHANGE UP (ref 32–36)
MCV RBC AUTO: 91.2 FL — SIGNIFICANT CHANGE UP (ref 80–100)
MONOCYTES # BLD AUTO: 0.71 K/UL — SIGNIFICANT CHANGE UP (ref 0–0.9)
MONOCYTES NFR BLD AUTO: 6.9 % — SIGNIFICANT CHANGE UP (ref 2–14)
NEUTROPHILS # BLD AUTO: 8.66 K/UL — HIGH (ref 1.8–7.4)
NEUTROPHILS NFR BLD AUTO: 83.6 % — HIGH (ref 43–77)
NRBC # BLD: 0 /100 WBCS — SIGNIFICANT CHANGE UP (ref 0–0)
PHOSPHATE SERPL-MCNC: 3.9 MG/DL
PLATELET # BLD AUTO: 263 K/UL — SIGNIFICANT CHANGE UP (ref 150–400)
POTASSIUM SERPL-SCNC: 4.2 MMOL/L
PROT SERPL-MCNC: 6.4 G/DL
RBC # BLD: 4.76 M/UL — SIGNIFICANT CHANGE UP (ref 4.2–5.8)
RBC # FLD: 13.6 % — SIGNIFICANT CHANGE UP (ref 10.3–14.5)
SODIUM SERPL-SCNC: 141 MMOL/L
WBC # BLD: 10.34 K/UL — SIGNIFICANT CHANGE UP (ref 3.8–10.5)
WBC # FLD AUTO: 10.34 K/UL — SIGNIFICANT CHANGE UP (ref 3.8–10.5)

## 2024-05-14 PROCEDURE — 99212 OFFICE O/P EST SF 10 MIN: CPT

## 2024-05-14 PROCEDURE — 99215 OFFICE O/P EST HI 40 MIN: CPT

## 2024-05-14 RX ORDER — ONDANSETRON 8 MG/1
8 TABLET, ORALLY DISINTEGRATING ORAL
Qty: 30 | Refills: 3 | Status: ACTIVE | COMMUNITY
Start: 2024-01-16 | End: 1900-01-01

## 2024-05-14 NOTE — DISCUSSION/SUMMARY
[FreeTextEntry1] : Glioblastoma, MGMT methylated.  ChemoRT interrupted by early POD, s/p resection.  Completed chemoRT.   Now on Alpheus study.   TUMOR:  Prescribed C1 /m2 (315mg) to begin today   BRAIN EDEMA Dex taper to 1 daily  SUPPORTIVE Keppra taper to 500 bid  HEME weekly CBC  RTC 1mo via TEB

## 2024-05-14 NOTE — HISTORY OF PRESENT ILLNESS
[FreeTextEntry1] : NEURO-ONCOLOGY  This 58 year old right handed man is in follow up regarding glioblastoma  He presented in 12/23 with headaches and memory loss, with confusion.  He developed left facial weakness.  He was found to have a right frontal and insular cystic and enhancing lesion. CTC AP was unrevealing.  Tumor was subtotally resected by Dr. Deleon on 12/29/23, revealing glioblastoma, IDH wt, MGMT methylated, Caris with EGFR amplification, TERT, RONA and MDM2 mutations, TMB 2.  He started chemoRT, but was admitted with massive progression and had resection of right frontal tumor on 2/14/24.  Completed RT/TMZ on 4/2/24.  Enrolled on [facility] trial of focused ultrasound.   TMZ C1 150/m2 5/14/24  INTERVAL HISTORY:  No headaches.  Feels well.  Tolerated treatment well.   PMH:  He had a number of cutaneous melanomas removed, but without systemic therapy or invasion, last of these was 12 mo ago.  PSH: as above. orthopedic surgery on shoulder. SHX:  Here with daughter Erica, not present is son Fabiano (a podiatrist) and ex wife Tammi. 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 Nonsmoker. nondrinker FHX:  no brain tumors

## 2024-05-14 NOTE — DATA REVIEWED
[de-identified] : Reviewed pre and post operative scans from 12/23 showing subtotal resection of cystic enhancing right frontal and insular lesion, there are satellite foci of enhancement that remain, markedly worse on 2/12/24 MRI with 2/13/24 MRI showing extensive resection of right frontal enhancement, stable on 3/19/24 MRI, 4/24 MRI with stable enhancement and FLAIR  No progressive tumor or edema on 2/27 CT head

## 2024-05-15 ENCOUNTER — APPOINTMENT (OUTPATIENT)
Dept: NEUROSURGERY | Facility: CLINIC | Age: 59
End: 2024-05-15
Payer: COMMERCIAL

## 2024-05-15 VITALS
HEART RATE: 80 BPM | TEMPERATURE: 98.6 F | BODY MASS INDEX: 27.92 KG/M2 | HEIGHT: 70 IN | SYSTOLIC BLOOD PRESSURE: 129 MMHG | WEIGHT: 195 LBS | OXYGEN SATURATION: 96 % | DIASTOLIC BLOOD PRESSURE: 88 MMHG

## 2024-05-15 DIAGNOSIS — Z98.890 OTHER SPECIFIED POSTPROCEDURAL STATES: ICD-10-CM

## 2024-05-15 DIAGNOSIS — D49.6 NEOPLASM OF UNSPECIFIED BEHAVIOR OF BRAIN: ICD-10-CM

## 2024-05-15 PROCEDURE — 99212 OFFICE O/P EST SF 10 MIN: CPT

## 2024-05-15 NOTE — HISTORY OF PRESENT ILLNESS
[FreeTextEntry1] : Mr. Fabiano Kovacs is a very pleasant 58-year-old male with a history of hypertension, hyperlipidemia, skin melanoma resected 3 times previously (last about 1 year ago) who presented with several months of altered mental status, repetitive speech, blurry vision, worsening memory, and headaches found to have a right frontal contrast-enhancing lesion and underwent resection on December 29, 2023 with regrowth of tumor requiring repeat resection on February 13, 2024.  He returns today for follow-up and is overall doing well.  He has some occasional headaches but nothing severe.  He has completed chemotherapy and radiation and is enrolled in a trial.  He denies any new focal neurological deficits.  He does notes that when he first gets up in the morning, the first step that he takes with his left leg feels weak but after that he does not have any issues.  No issues with the incision.

## 2024-05-15 NOTE — ASSESSMENT
[FreeTextEntry1] : Mr. Fabiano Kovacs is a very pleasant 58-year-old male with a history of hypertension, hyperlipidemia, skin melanoma resected 3 times previously (last about 1 year ago) who presented with several months of altered mental status, repetitive speech, blurry vision, worsening memory, and headaches found to have a right frontal contrast-enhancing lesion and underwent resection on December 29, 2023 with regrowth of tumor requiring repeat resection on February 13, 2024. He returns today for follow-up and is overall doing well.  I am pleased with how he is doing.  He will continue his trial.  I will see him back in 3 months.  He and his family know to call my office with any issues or concerns.  All questions answered.

## 2024-05-15 NOTE — PHYSICAL EXAM
[FreeTextEntry1] : AOX3 Face symmetric 5/5 strength throughout No pronator drift Incision well healed.

## 2024-05-20 ENCOUNTER — OFFICE (OUTPATIENT)
Dept: URBAN - METROPOLITAN AREA CLINIC 8 | Facility: CLINIC | Age: 59
Setting detail: OPHTHALMOLOGY
End: 2024-05-20
Payer: COMMERCIAL

## 2024-05-20 DIAGNOSIS — H25.13: ICD-10-CM

## 2024-05-20 DIAGNOSIS — H52.4: ICD-10-CM

## 2024-05-20 PROBLEM — H11.153 PINGUECULA; BOTH EYES: Status: ACTIVE | Noted: 2024-05-20

## 2024-05-20 PROCEDURE — 92015 DETERMINE REFRACTIVE STATE: CPT | Performed by: OPHTHALMOLOGY

## 2024-05-20 PROCEDURE — 92004 COMPRE OPH EXAM NEW PT 1/>: CPT | Performed by: OPHTHALMOLOGY

## 2024-05-20 ASSESSMENT — CONFRONTATIONAL VISUAL FIELD TEST (CVF)
OS_FINDINGS: FULL
OD_FINDINGS: FULL

## 2024-05-21 ENCOUNTER — APPOINTMENT (OUTPATIENT)
Dept: NEUROSURGERY | Facility: CLINIC | Age: 59
End: 2024-05-21
Payer: SUBSIDIZED

## 2024-05-21 VITALS
HEIGHT: 70 IN | TEMPERATURE: 98.2 F | OXYGEN SATURATION: 97 % | BODY MASS INDEX: 29.35 KG/M2 | DIASTOLIC BLOOD PRESSURE: 89 MMHG | RESPIRATION RATE: 16 BRPM | WEIGHT: 205 LBS | SYSTOLIC BLOOD PRESSURE: 139 MMHG | HEART RATE: 85 BPM

## 2024-05-21 DIAGNOSIS — F41.9 ANXIETY DISORDER, UNSPECIFIED: ICD-10-CM

## 2024-05-21 DIAGNOSIS — E78.5 HYPERLIPIDEMIA, UNSPECIFIED: ICD-10-CM

## 2024-05-21 DIAGNOSIS — F32.A ANXIETY DISORDER, UNSPECIFIED: ICD-10-CM

## 2024-05-21 PROCEDURE — 99212 OFFICE O/P EST SF 10 MIN: CPT

## 2024-05-23 ENCOUNTER — EMERGENCY (EMERGENCY)
Facility: HOSPITAL | Age: 59
LOS: 1 days | Discharge: HOME | End: 2024-05-23
Attending: STUDENT IN AN ORGANIZED HEALTH CARE EDUCATION/TRAINING PROGRAM
Payer: COMMERCIAL

## 2024-05-23 VITALS
TEMPERATURE: 98 F | RESPIRATION RATE: 16 BRPM | HEART RATE: 84 BPM | OXYGEN SATURATION: 99 % | SYSTOLIC BLOOD PRESSURE: 135 MMHG | DIASTOLIC BLOOD PRESSURE: 80 MMHG

## 2024-05-23 VITALS
TEMPERATURE: 98 F | HEART RATE: 86 BPM | WEIGHT: 206.57 LBS | SYSTOLIC BLOOD PRESSURE: 154 MMHG | HEIGHT: 70 IN | OXYGEN SATURATION: 95 % | DIASTOLIC BLOOD PRESSURE: 111 MMHG | RESPIRATION RATE: 20 BRPM

## 2024-05-23 DIAGNOSIS — Z98.890 OTHER SPECIFIED POSTPROCEDURAL STATES: Chronic | ICD-10-CM

## 2024-05-23 LAB
ALBUMIN SERPL ELPH-MCNC: 3.8 G/DL — SIGNIFICANT CHANGE UP (ref 3.3–5.2)
ALBUMIN SERPL ELPH-MCNC: 4.2 G/DL
ALP BLD-CCNC: 104 U/L
ALP SERPL-CCNC: 97 U/L — SIGNIFICANT CHANGE UP (ref 40–120)
ALT FLD-CCNC: 67 U/L — HIGH
ALT SERPL-CCNC: 78 U/L
ANION GAP SERPL CALC-SCNC: 10 MMOL/L — SIGNIFICANT CHANGE UP (ref 5–17)
ANION GAP SERPL CALC-SCNC: 14 MMOL/L
AST SERPL-CCNC: 35 U/L
AST SERPL-CCNC: 36 U/L — SIGNIFICANT CHANGE UP
BASOPHILS # BLD AUTO: 0.03 K/UL — SIGNIFICANT CHANGE UP (ref 0–0.2)
BASOPHILS # BLD AUTO: 0.04 K/UL
BASOPHILS # BLD AUTO: 0.05 K/UL
BASOPHILS NFR BLD AUTO: 0.5 %
BASOPHILS NFR BLD AUTO: 0.5 % — SIGNIFICANT CHANGE UP (ref 0–2)
BASOPHILS NFR BLD AUTO: 0.6 %
BILIRUB SERPL-MCNC: 0.4 MG/DL — SIGNIFICANT CHANGE UP (ref 0.4–2)
BILIRUB SERPL-MCNC: 0.5 MG/DL
BUN SERPL-MCNC: 6.7 MG/DL — LOW (ref 8–20)
BUN SERPL-MCNC: 8 MG/DL
CALCIUM SERPL-MCNC: 8.8 MG/DL — SIGNIFICANT CHANGE UP (ref 8.4–10.5)
CALCIUM SERPL-MCNC: 9.4 MG/DL
CHLORIDE SERPL-SCNC: 100 MMOL/L — SIGNIFICANT CHANGE UP (ref 96–108)
CHLORIDE SERPL-SCNC: 101 MMOL/L
CO2 SERPL-SCNC: 22 MMOL/L
CO2 SERPL-SCNC: 26 MMOL/L — SIGNIFICANT CHANGE UP (ref 22–29)
CREAT SERPL-MCNC: 0.59 MG/DL — SIGNIFICANT CHANGE UP (ref 0.5–1.3)
CREAT SERPL-MCNC: 0.71 MG/DL
EGFR: 105 ML/MIN/1.73M2
EGFR: 111 ML/MIN/1.73M2 — SIGNIFICANT CHANGE UP
EOSINOPHIL # BLD AUTO: 0.02 K/UL
EOSINOPHIL # BLD AUTO: 0.02 K/UL — SIGNIFICANT CHANGE UP (ref 0–0.5)
EOSINOPHIL # BLD AUTO: 0.03 K/UL
EOSINOPHIL NFR BLD AUTO: 0.2 %
EOSINOPHIL NFR BLD AUTO: 0.3 % — SIGNIFICANT CHANGE UP (ref 0–6)
EOSINOPHIL NFR BLD AUTO: 0.4 %
GLUCOSE SERPL-MCNC: 106 MG/DL
GLUCOSE SERPL-MCNC: 98 MG/DL — SIGNIFICANT CHANGE UP (ref 70–99)
HCT VFR BLD CALC: 40.9 % — SIGNIFICANT CHANGE UP (ref 39–50)
HCT VFR BLD CALC: 43.4 %
HCT VFR BLD CALC: 45.4 %
HGB BLD-MCNC: 14.4 G/DL — SIGNIFICANT CHANGE UP (ref 13–17)
HGB BLD-MCNC: 14.8 G/DL
HGB BLD-MCNC: 15.6 G/DL
IMM GRANULOCYTES NFR BLD AUTO: 0.8 % — SIGNIFICANT CHANGE UP (ref 0–0.9)
IMM GRANULOCYTES NFR BLD AUTO: 0.9 %
IMM GRANULOCYTES NFR BLD AUTO: 1.1 %
LDH SERPL-CCNC: 250 U/L
LYMPHOCYTES # BLD AUTO: 0.87 K/UL
LYMPHOCYTES # BLD AUTO: 0.9 K/UL
LYMPHOCYTES # BLD AUTO: 0.96 K/UL — LOW (ref 1–3.3)
LYMPHOCYTES # BLD AUTO: 15.3 % — SIGNIFICANT CHANGE UP (ref 13–44)
LYMPHOCYTES NFR BLD AUTO: 11 %
LYMPHOCYTES NFR BLD AUTO: 11.4 %
MAGNESIUM SERPL-MCNC: 2 MG/DL
MAN DIFF?: NORMAL
MAN DIFF?: NORMAL
MCHC RBC-ENTMCNC: 32 PG
MCHC RBC-ENTMCNC: 32.1 PG — SIGNIFICANT CHANGE UP (ref 27–34)
MCHC RBC-ENTMCNC: 32.2 PG
MCHC RBC-ENTMCNC: 34.1 GM/DL
MCHC RBC-ENTMCNC: 34.4 GM/DL
MCHC RBC-ENTMCNC: 35.2 GM/DL — SIGNIFICANT CHANGE UP (ref 32–36)
MCV RBC AUTO: 91.1 FL — SIGNIFICANT CHANGE UP (ref 80–100)
MCV RBC AUTO: 93.2 FL
MCV RBC AUTO: 94.6 FL
MONOCYTES # BLD AUTO: 0.7 K/UL
MONOCYTES # BLD AUTO: 0.75 K/UL
MONOCYTES # BLD AUTO: 0.76 K/UL — SIGNIFICANT CHANGE UP (ref 0–0.9)
MONOCYTES NFR BLD AUTO: 12.1 % — SIGNIFICANT CHANGE UP (ref 2–14)
MONOCYTES NFR BLD AUTO: 9.2 %
MONOCYTES NFR BLD AUTO: 9.2 %
NEUTROPHILS # BLD AUTO: 4.46 K/UL — SIGNIFICANT CHANGE UP (ref 1.8–7.4)
NEUTROPHILS # BLD AUTO: 5.88 K/UL
NEUTROPHILS # BLD AUTO: 6.37 K/UL
NEUTROPHILS NFR BLD AUTO: 71 % — SIGNIFICANT CHANGE UP (ref 43–77)
NEUTROPHILS NFR BLD AUTO: 77.4 %
NEUTROPHILS NFR BLD AUTO: 78.1 %
PHOSPHATE SERPL-MCNC: 3.8 MG/DL
PLATELET # BLD AUTO: 224 K/UL — SIGNIFICANT CHANGE UP (ref 150–400)
PLATELET # BLD AUTO: 275 K/UL
PLATELET # BLD AUTO: 284 K/UL
POTASSIUM SERPL-MCNC: 4.2 MMOL/L — SIGNIFICANT CHANGE UP (ref 3.5–5.3)
POTASSIUM SERPL-SCNC: 3.9 MMOL/L
POTASSIUM SERPL-SCNC: 4.2 MMOL/L — SIGNIFICANT CHANGE UP (ref 3.5–5.3)
PROT SERPL-MCNC: 6.3 G/DL
PROT SERPL-MCNC: 6.3 G/DL — LOW (ref 6.6–8.7)
RBC # BLD: 4.49 M/UL — SIGNIFICANT CHANGE UP (ref 4.2–5.8)
RBC # BLD: 4.59 M/UL
RBC # BLD: 4.87 M/UL
RBC # FLD: 13.4 % — SIGNIFICANT CHANGE UP (ref 10.3–14.5)
RBC # FLD: 13.8 %
RBC # FLD: 14 %
SODIUM SERPL-SCNC: 136 MMOL/L — SIGNIFICANT CHANGE UP (ref 135–145)
SODIUM SERPL-SCNC: 137 MMOL/L
WBC # BLD: 6.28 K/UL — SIGNIFICANT CHANGE UP (ref 3.8–10.5)
WBC # FLD AUTO: 6.28 K/UL — SIGNIFICANT CHANGE UP (ref 3.8–10.5)
WBC # FLD AUTO: 7.6 K/UL
WBC # FLD AUTO: 8.16 K/UL

## 2024-05-23 PROCEDURE — 99285 EMERGENCY DEPT VISIT HI MDM: CPT | Mod: 25

## 2024-05-23 PROCEDURE — 82962 GLUCOSE BLOOD TEST: CPT

## 2024-05-23 PROCEDURE — 80053 COMPREHEN METABOLIC PANEL: CPT

## 2024-05-23 PROCEDURE — 36415 COLL VENOUS BLD VENIPUNCTURE: CPT

## 2024-05-23 PROCEDURE — 99285 EMERGENCY DEPT VISIT HI MDM: CPT

## 2024-05-23 PROCEDURE — 70470 CT HEAD/BRAIN W/O & W/DYE: CPT | Mod: MC

## 2024-05-23 PROCEDURE — 70470 CT HEAD/BRAIN W/O & W/DYE: CPT | Mod: 26,MC

## 2024-05-23 PROCEDURE — 93005 ELECTROCARDIOGRAM TRACING: CPT

## 2024-05-23 PROCEDURE — 96375 TX/PRO/DX INJ NEW DRUG ADDON: CPT | Mod: XU

## 2024-05-23 PROCEDURE — 96374 THER/PROPH/DIAG INJ IV PUSH: CPT | Mod: XU

## 2024-05-23 PROCEDURE — 93010 ELECTROCARDIOGRAM REPORT: CPT

## 2024-05-23 PROCEDURE — 85025 COMPLETE CBC W/AUTO DIFF WBC: CPT

## 2024-05-23 RX ORDER — KETOROLAC TROMETHAMINE 30 MG/ML
30 SYRINGE (ML) INJECTION ONCE
Refills: 0 | Status: DISCONTINUED | OUTPATIENT
Start: 2024-05-23 | End: 2024-05-23

## 2024-05-23 RX ORDER — METOCLOPRAMIDE HCL 10 MG
10 TABLET ORAL ONCE
Refills: 0 | Status: COMPLETED | OUTPATIENT
Start: 2024-05-23 | End: 2024-05-23

## 2024-05-23 RX ORDER — SODIUM CHLORIDE 9 MG/ML
1000 INJECTION INTRAMUSCULAR; INTRAVENOUS; SUBCUTANEOUS ONCE
Refills: 0 | Status: COMPLETED | OUTPATIENT
Start: 2024-05-23 | End: 2024-05-23

## 2024-05-23 RX ADMIN — Medication 30 MILLIGRAM(S): at 10:22

## 2024-05-23 RX ADMIN — SODIUM CHLORIDE 1000 MILLILITER(S): 9 INJECTION INTRAMUSCULAR; INTRAVENOUS; SUBCUTANEOUS at 10:22

## 2024-05-23 RX ADMIN — Medication 30 MILLIGRAM(S): at 12:38

## 2024-05-23 RX ADMIN — Medication 10 MILLIGRAM(S): at 10:22

## 2024-05-23 NOTE — ED ADULT TRIAGE NOTE - CHIEF COMPLAINT QUOTE
Pt A&Ox4, NAD. Pt presents to the ED with complaints of HA since 1am. Hx of tumor resection in the frontal lobe. Pt also with complaints of dizziness, nausea. Denies CP at this time.  in triage.

## 2024-05-23 NOTE — ED PROVIDER NOTE - INTERNATIONAL TRAVEL
No

## 2024-05-23 NOTE — ED ADULT NURSE NOTE - OBJECTIVE STATEMENT
58 YO male presented to the ED with c/o headache x 2 days. c/o dizziness, nausea & weakness x 1day. Son at bedside. Patient stated he has  ah/o brain tumor s/p 2 resections in December 2023 and February 2024. Patient on Chemotherapy medications. C/o sudden onset headache, no aggravating or relieving factors, constant, nonradiating. Patient stated he feels pressure behind his eyes associated with blurry vison. Blurry vison at baseline since 1 month being addressed by ophthalmologist as per patient.  Patient denies vomiting, chest pain, SOB or use of anticoagulation medications.  Patient placed on cardiac monitor.

## 2024-05-23 NOTE — ED PROVIDER NOTE - NSFOLLOWUPINSTRUCTIONS_ED_ALL_ED_FT
You were seen and evaluated in the emergency department for your symptoms.  You was.  Your results did not show any evidence of new mass.  Should your symptoms return please return to the emergency department for further management.    Please follow-up with your primary care doctor within 2 days to discuss your visit here today.    Please return to the emergency department for any new or concerning symptoms including but not limited to fever, chills, headaches, dizziness, weakness, chest pain, difficulty breathing.

## 2024-05-27 DIAGNOSIS — R51.9 HEADACHE, UNSPECIFIED: ICD-10-CM

## 2024-05-27 DIAGNOSIS — I10 ESSENTIAL (PRIMARY) HYPERTENSION: ICD-10-CM

## 2024-05-27 DIAGNOSIS — H53.2 DIPLOPIA: ICD-10-CM

## 2024-05-27 DIAGNOSIS — R42 DIZZINESS AND GIDDINESS: ICD-10-CM

## 2024-05-27 DIAGNOSIS — E78.5 HYPERLIPIDEMIA, UNSPECIFIED: ICD-10-CM

## 2024-06-04 LAB
ALBUMIN SERPL ELPH-MCNC: 4.3 G/DL
ALP BLD-CCNC: 97 U/L
ALT SERPL-CCNC: 41 U/L
ANION GAP SERPL CALC-SCNC: 13 MMOL/L
APTT BLD: 31.2 SEC
AST SERPL-CCNC: 20 U/L
BASOPHILS # BLD AUTO: 0.02 K/UL
BASOPHILS NFR BLD AUTO: 0.3 %
BILIRUB SERPL-MCNC: 0.3 MG/DL
BUN SERPL-MCNC: 9 MG/DL
CALCIUM SERPL-MCNC: 9.8 MG/DL
CHLORIDE SERPL-SCNC: 106 MMOL/L
CO2 SERPL-SCNC: 24 MMOL/L
CREAT SERPL-MCNC: 0.77 MG/DL
EGFR: 102 ML/MIN/1.73M2
EOSINOPHIL # BLD AUTO: 0.01 K/UL
EOSINOPHIL NFR BLD AUTO: 0.2 %
GLUCOSE SERPL-MCNC: 151 MG/DL
HCT VFR BLD CALC: 42.7 %
HGB BLD-MCNC: 14.5 G/DL
IMM GRANULOCYTES NFR BLD AUTO: 0.5 %
INR PPP: 0.95 RATIO
LDH SERPL-CCNC: 208 U/L
LYMPHOCYTES # BLD AUTO: 0.85 K/UL
LYMPHOCYTES NFR BLD AUTO: 14.8 %
MAGNESIUM SERPL-MCNC: 1.9 MG/DL
MAN DIFF?: NORMAL
MCHC RBC-ENTMCNC: 31.7 PG
MCHC RBC-ENTMCNC: 34 GM/DL
MCV RBC AUTO: 93.2 FL
MONOCYTES # BLD AUTO: 0.36 K/UL
MONOCYTES NFR BLD AUTO: 6.3 %
NEUTROPHILS # BLD AUTO: 4.46 K/UL
NEUTROPHILS NFR BLD AUTO: 77.9 %
PHOSPHATE SERPL-MCNC: 3.7 MG/DL
PLATELET # BLD AUTO: 327 K/UL
POTASSIUM SERPL-SCNC: 4.4 MMOL/L
PROT SERPL-MCNC: 6.2 G/DL
PT BLD: 10.7 SEC
RBC # BLD: 4.58 M/UL
RBC # FLD: 13.4 %
SODIUM SERPL-SCNC: 143 MMOL/L
WBC # FLD AUTO: 5.73 K/UL

## 2024-06-05 ENCOUNTER — APPOINTMENT (OUTPATIENT)
Dept: NEUROSURGERY | Facility: CLINIC | Age: 59
End: 2024-06-05

## 2024-06-06 NOTE — REVIEW OF SYSTEMS
[As Noted in HPI] : as noted in HPI [Negative] : Integumentary [FreeTextEntry3] : blurry vision b/l left eye > right eye. - seen by optho

## 2024-06-06 NOTE — REASON FOR VISIT
[Follow-Up: _____] : a [unfilled] follow-up visit [Family Member] : family member [FreeTextEntry1] : Fermín Harmon is a 58-year-old right-handed male with a past medical history of HLD and melanoma resection x3. On 12/23/24 he presented with confusion, forgetfulness, right facial droop. Tumor was biopsied and debulked by Dr. Deleon on 12/29/23, revealing glioblastoma, IDH wt, MGMT methylated. Prior to initiation of chem/RT underwent a right frontal craniotomy on 2/14/24 without progression of disease. Completed RT/TMZ on 4/3/24.   He consented to participate in the Alpheus trial on 5/1/2024 5/7/2024 C1D1 5/9/2024 C1D3 5/14/2024 C1 D8  Today 05/21/2024 C1D15 he reports right side headache at 1am that woke him from sleep he took 650 mg Tylenol with some relief. At 0930 6-7/10 headache took another 650 mg of Tylenol with relief.   *Steroids taper last week from 2mg to 1mg  He was seen by the eye doctor and was told he has early cataracts which was likely the cause of the blurry vision he has. His prescription was changed, his vision is now 20/20 corrected.   Exam - A+OX4, PERRL EOMI, speech clear, face symmetrical, WALKER 5/5, SILT, gait steady without assistive  device, word recall 3/3 but reports some difficulty remembering details i.e. timing of appointments etc. skin and scalp intact.  KPS 80 ECOG 1

## 2024-06-06 NOTE — ASSESSMENT
[FreeTextEntry1] : Mr. Kovacs is a 58-year-old male s/p biopsy and debulking on 12/29/23- Path GBM, IDH wt, MGMT methylated. On 2/14/2024 underwent a craniotomy without progression of disease. He completed RT/TMZ on 4/3/2024.  Discussion: Ok to take 2 Extra strength Tylenol for the headaches q8hrs- no more than 3000mg a day. Can alternate with Ibuprofen 400-600 mg BID with food.  If headaches persist, please call office. May consider increasing steroid to 2mg.  Labs as per protocol C2D1 office visit on 6/5 Nothing to eat or drink after midnight on 6/4 Take Zofran prior to arrival  Contact office with any questions or concerns.

## 2024-06-06 NOTE — REASON FOR VISIT
[Follow-Up: _____] : a [unfilled] follow-up visit [Family Member] : family member [FreeTextEntry1] : Fermín Harmon is a 58-year-old right-handed male with a past medical history of HLD and melanoma resection x3. On 12/23/24 he presented with confusion, forgetfulness, right facial droop. Tumor was biopsied and debulked by Dr. Deleon on 12/29/23, revealing glioblastoma, IDH wt, MGMT methylated. Prior to initiation of chem/RT underwent a right frontal craniotomy on 2/14/24 without progression of disease. Completed RT/TMZ on 4/3/24.   He consented to participate in the Alpheus trial on 5/1/2024 5/7/2024 C1D1 5/9/2024 C1D3  Today 05/14/2024 C1D8 he reports feeling well. No changes since last visit.   Exam - A+OX4, PERRL EOMI, speech clear, face symmetrical, WALKER 5/5, SILT, gait steady without assistive device, word recall 3/3 but reports some difficulty remembering details i.e. timing of appointments etc. skin and scalp intact.  KPS 80 ECOG 1

## 2024-06-06 NOTE — ASSESSMENT
[FreeTextEntry1] : Mr. Kovacs is a 58-year-old male s/p biopsy and debulking on 12/29/23- Path GBM, IDH wt, MGMT methylated. On 2/14/2024 underwent a craniotomy without progression of disease. He completed RT/TMZ on 4/3/2024.  Discussion: Labs as per protocol C1D15 office visit on 5/21/24 Adjuvant chemotherapy as to be started 5/14 per Dr. Calix  Proceed with ophthalmologist appointment scheduled on 5/20 Contact office with any questions or concerns.

## 2024-06-10 ENCOUNTER — APPOINTMENT (OUTPATIENT)
Dept: NEUROLOGY | Facility: CLINIC | Age: 59
End: 2024-06-10
Payer: SUBSIDIZED

## 2024-06-10 PROCEDURE — 99215 OFFICE O/P EST HI 40 MIN: CPT

## 2024-06-11 NOTE — PHYSICAL EXAM
[FreeTextEntry1] : Limited by TEB  Well appearing man, seated in chair Alert oriented x3, able to provide history, not aphasic, memory and concentration intact Face symmetric EOMI, tongue midline Motor wihtout drift or apparent weakness ambulates on own with normal station and gait

## 2024-06-11 NOTE — DISCUSSION/SUMMARY
[FreeTextEntry1] : Glioblastoma, MGMT methylated.  ChemoRT interrupted by early POD, s/p resection.  Completed chemoRT.   Now on Alpheus study.   TUMOR:  Prescribed C2  dose escalatedTMZ 200/m2 (420mg) to begin ASAP.  MRI per protocol later this month.   BRAIN EDEMA Dex  1 daily taper next visit  SUPPORTIVE Keppra 500 bid, taper next visit  HEME weekly CBC  RTC 6/25/24

## 2024-06-11 NOTE — REASON FOR VISIT
[Home] : at home, [unfilled] , at the time of the visit. [Medical Office: ([facility])___] : at the medical office located in  [Patient] : the patient [Self] : self

## 2024-06-11 NOTE — HISTORY OF PRESENT ILLNESS
[FreeTextEntry1] : NEURO-ONCOLOGY  This 58 year old right handed man is in follow up regarding glioblastoma  He presented in 12/23 with headaches and memory loss, with confusion.  He developed left facial weakness.  He was found to have a right frontal and insular cystic and enhancing lesion. CTC AP was unrevealing.  Tumor was subtotally resected by Dr. Deleon on 12/29/23, revealing glioblastoma, IDH wt, MGMT methylated, Caris with EGFR amplification, TERT, RONA and MDM2 mutations, TMB 2.  He started chemoRT, but was admitted with massive progression and had resection of right frontal tumor on 2/14/24.  Completed RT/TMZ on 4/2/24.  [facility]rolled on [facility] trial of focused [facility].   TMZ C1 150/m2 5/14/24  INTERVAL HISTORY:  No headaches.  Feels well.  Tolerated treatment well. CBC maintained.   PMH:  He had a number of cutaneous melanomas removed, but without systemic therapy or invasion, last of these was 12 mo ago.  PSH: as above. orthopedic surgery on shoulder. SHX:  Here with daughter Erica, not present is son Fabiano (a podiatrist) and ex wife Tammi. 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 Nonsmoker. nondrinker FHX:  no brain tumors

## 2024-06-11 NOTE — DATA REVIEWED
[de-identified] : Reviewed pre and post operative scans from 12/23 showing subtotal resection of cystic enhancing right frontal and insular lesion, there are satellite foci of enhancement that remain, markedly worse on 2/12/24 MRI with 2/13/24 MRI showing extensive resection of right frontal enhancement, stable on 3/19/24 MRI, 4/24 MRI with stable enhancement and FLAIR  No progressive tumor or edema on 2/27 CT head

## 2024-06-12 LAB
ALBUMIN SERPL ELPH-MCNC: 4.3 G/DL
ALP BLD-CCNC: 108 U/L
ALT SERPL-CCNC: 59 U/L
AST SERPL-CCNC: 26 U/L
BASOPHILS # BLD AUTO: 0.04 K/UL
BASOPHILS NFR BLD AUTO: 0.6 %
BILIRUB DIRECT SERPL-MCNC: 0.1 MG/DL
BILIRUB INDIRECT SERPL-MCNC: 0.2 MG/DL
BILIRUB SERPL-MCNC: 0.3 MG/DL
EOSINOPHIL # BLD AUTO: 0.01 K/UL
EOSINOPHIL NFR BLD AUTO: 0.1 %
HCT VFR BLD CALC: 43.7 %
HGB BLD-MCNC: 15.2 G/DL
IMM GRANULOCYTES NFR BLD AUTO: 0.6 %
LYMPHOCYTES # BLD AUTO: 0.94 K/UL
LYMPHOCYTES NFR BLD AUTO: 13.7 %
MAN DIFF?: NORMAL
MCHC RBC-ENTMCNC: 31.9 PG
MCHC RBC-ENTMCNC: 34.8 GM/DL
MCV RBC AUTO: 91.6 FL
MONOCYTES # BLD AUTO: 0.57 K/UL
MONOCYTES NFR BLD AUTO: 8.3 %
NEUTROPHILS # BLD AUTO: 5.25 K/UL
NEUTROPHILS NFR BLD AUTO: 76.7 %
PLATELET # BLD AUTO: 341 K/UL
PROT SERPL-MCNC: 6.3 G/DL
RBC # BLD: 4.77 M/UL
RBC # FLD: 13.2 %
WBC # FLD AUTO: 6.85 K/UL

## 2024-06-18 ENCOUNTER — NON-APPOINTMENT (OUTPATIENT)
Age: 59
End: 2024-06-18

## 2024-06-19 ENCOUNTER — APPOINTMENT (OUTPATIENT)
Dept: NEUROSURGERY | Facility: CLINIC | Age: 59
End: 2024-06-19
Payer: SUBSIDIZED

## 2024-06-19 VITALS
OXYGEN SATURATION: 96 % | TEMPERATURE: 98.5 F | BODY MASS INDEX: 29.35 KG/M2 | DIASTOLIC BLOOD PRESSURE: 88 MMHG | HEIGHT: 70 IN | HEART RATE: 85 BPM | SYSTOLIC BLOOD PRESSURE: 129 MMHG | WEIGHT: 205 LBS | RESPIRATION RATE: 16 BRPM

## 2024-06-19 PROCEDURE — 99212 OFFICE O/P EST SF 10 MIN: CPT

## 2024-06-20 LAB
BASOPHILS # BLD AUTO: 0.07 K/UL
BASOPHILS NFR BLD AUTO: 1 %
EOSINOPHIL # BLD AUTO: 0.07 K/UL
EOSINOPHIL NFR BLD AUTO: 1 %
HCT VFR BLD CALC: 47.3 %
HGB BLD-MCNC: 15 G/DL
IMM GRANULOCYTES NFR BLD AUTO: 1 %
LYMPHOCYTES # BLD AUTO: 1.27 K/UL
LYMPHOCYTES NFR BLD AUTO: 18.8 %
MAN DIFF?: NORMAL
MCHC RBC-ENTMCNC: 31.2 PG
MCHC RBC-ENTMCNC: 31.7 GM/DL
MCV RBC AUTO: 98.3 FL
MONOCYTES # BLD AUTO: 0.54 K/UL
MONOCYTES NFR BLD AUTO: 8 %
NEUTROPHILS # BLD AUTO: 4.72 K/UL
NEUTROPHILS NFR BLD AUTO: 70.2 %
PLATELET # BLD AUTO: 283 K/UL
RBC # BLD: 4.81 M/UL
RBC # FLD: 13.5 %
WBC # FLD AUTO: 6.74 K/UL

## 2024-06-20 NOTE — REVIEW OF SYSTEMS
[As Noted in HPI] : as noted in HPI [Negative] : Integumentary [FreeTextEntry3] : continued eye blurriness- unchanged not worse

## 2024-06-20 NOTE — ASSESSMENT
[FreeTextEntry1] : Mr. Kovacs is a 58-year-old male s/p biopsy and debulking on 12/29/23- Path GBM, IDH wt, MGMT methylated. On 2/14/2024 underwent a craniotomy without progression of disease. He completed RT/TMZ on 4/3/2024.  Discussion: MR w/wo with perfusion on 6/25- followed by office visit with Dr. Singer Graham as per protocol 6/28 C3D1 7/2- NPO after midnight. Zofran the morning of treatment   .IKarl evaluated the patient with the nurse practitioner Joann Barthelemy and established the plan of care. I personally discuss this patient with the nurse practitioner at the time of the visit. I agree with the assessment and plan as written, unless noted below.

## 2024-06-24 NOTE — REASON FOR VISIT
[Family Member] : family member [FreeTextEntry1] : Fermín Harmon is a 59-year-old right-handed male with a past medical history of HLD and melanoma resection x3. On 12/23/24 he presented with confusion, forgetfulness, right facial droop. Tumor was biopsied and debulked by Dr. Deleon on 12/29/23, revealing glioblastoma, IDH wt, MGMT methylated. Prior to initiation of chem/RT underwent a right frontal craniotomy on 2/14/24 without progression of disease. Completed RT/TMZ on 4/3/24.   He consented to participate in the Alpheus trial on 5/1/2024 5/7/2024 C1D1 5/9/2024 C1D3 5/14/2024 C1 D8 05/21/2024 C1D15  Today 6/5/2024 C2 D1 he reports feeling well. Today he reports occasional mild headache relieved with Tylenol NPO after midnight  0630-Pre 5ALA VS /90, HR 68 RR 16, Temp 98.1 Height 5'10 Weight 204   Exam 0630- A+OX4, PERRL EOMI, speech clear, face symmetrical, WALKER 5/5, SILT, gait steady without assistive device, word recall 3/3 but reports some difficulty remembering details i.e. timing of appointments etc. (unchanged) skin and scalp intact.  KPS 80 ECOG 1  0635-5ALA  1215- Pretreatment   /76, HR 94 98.1, RR 16 Neuro exam unchanged. KPS 80 ECOG 1  1550- immediate posttreatment  /82 HR 84 RR 16 Temp 98.3 Neuro exam unchanged KPS 80 ECOG 1  1650- 1-hour posttreatment  VS:132/85, HR 89, RR 16, 98.3 Neuro exam unchanged KPS 80 ECOG 1 pt tolerated treatment well.

## 2024-06-24 NOTE — ASSESSMENT
[FreeTextEntry1] : Mr. Kovacs is a 58-year-old male s/p biopsy and debulking on 12/29/23- Path GBM, IDH wt, MGMT methylated. On 2/14/2024 underwent a craniotomy without progression of disease. He completed RT/TMZ on 4/3/2024.  Discussion: Avoid bright light for 24 hours Labs as per protocol Maintenace TMZ to start between 6/10-6/12- Consult with Dr. Calix as scheduled on 6/10 C2D15 OV 6/19 Contact office with any questions or concerns.

## 2024-06-25 ENCOUNTER — APPOINTMENT (OUTPATIENT)
Dept: NEUROLOGY | Facility: CLINIC | Age: 59
End: 2024-06-25
Payer: SUBSIDIZED

## 2024-06-25 ENCOUNTER — APPOINTMENT (OUTPATIENT)
Dept: MRI IMAGING | Facility: IMAGING CENTER | Age: 59
End: 2024-06-25

## 2024-06-25 ENCOUNTER — OUTPATIENT (OUTPATIENT)
Dept: OUTPATIENT SERVICES | Facility: HOSPITAL | Age: 59
LOS: 1 days | End: 2024-06-25
Payer: COMMERCIAL

## 2024-06-25 VITALS
SYSTOLIC BLOOD PRESSURE: 136 MMHG | HEART RATE: 82 BPM | TEMPERATURE: 97.9 F | WEIGHT: 205 LBS | HEIGHT: 70 IN | BODY MASS INDEX: 29.35 KG/M2 | DIASTOLIC BLOOD PRESSURE: 85 MMHG | RESPIRATION RATE: 16 BRPM | OXYGEN SATURATION: 98 %

## 2024-06-25 DIAGNOSIS — Z98.890 OTHER SPECIFIED POSTPROCEDURAL STATES: Chronic | ICD-10-CM

## 2024-06-25 DIAGNOSIS — C71.9 MALIGNANT NEOPLASM OF BRAIN, UNSPECIFIED: ICD-10-CM

## 2024-06-25 PROCEDURE — 70553 MRI BRAIN STEM W/O & W/DYE: CPT | Mod: 26

## 2024-06-25 PROCEDURE — 70553 MRI BRAIN STEM W/O & W/DYE: CPT

## 2024-06-25 PROCEDURE — 99215 OFFICE O/P EST HI 40 MIN: CPT

## 2024-06-25 PROCEDURE — A9585: CPT

## 2024-06-25 RX ORDER — DEXAMETHASONE 1 MG/1
1 TABLET ORAL DAILY
Qty: 10 | Refills: 0 | Status: ACTIVE | COMMUNITY
Start: 2024-01-16

## 2024-06-25 RX ORDER — ESCITALOPRAM OXALATE 10 MG/1
10 TABLET, FILM COATED ORAL DAILY
Qty: 30 | Refills: 1 | Status: ACTIVE | COMMUNITY
Start: 2024-05-01

## 2024-06-25 RX ORDER — ROSUVASTATIN CALCIUM 20 MG/1
20 TABLET, FILM COATED ORAL DAILY
Refills: 0 | Status: ACTIVE | COMMUNITY
Start: 2024-05-01

## 2024-06-25 RX ORDER — TEMOZOLOMIDE 20 MG/1
20 CAPSULE ORAL
Qty: 5 | Refills: 0 | Status: ACTIVE | COMMUNITY
Start: 2024-05-14 | End: 1900-01-01

## 2024-06-25 RX ORDER — LEVETIRACETAM 500 MG/1
500 TABLET, FILM COATED ORAL
Qty: 60 | Refills: 3 | Status: ACTIVE | COMMUNITY
Start: 2024-01-16

## 2024-06-25 RX ORDER — PANTOPRAZOLE 40 MG/1
40 TABLET, DELAYED RELEASE ORAL
Qty: 30 | Refills: 3 | Status: ACTIVE | COMMUNITY
Start: 2024-01-16

## 2024-06-25 RX ORDER — TEMOZOLOMIDE 100 MG/1
100 CAPSULE ORAL
Qty: 20 | Refills: 0 | Status: ACTIVE | COMMUNITY
Start: 2024-05-14 | End: 1900-01-01

## 2024-07-01 LAB
ALBUMIN SERPL ELPH-MCNC: 4.5 G/DL
ALP BLD-CCNC: 120 U/L
ALT SERPL-CCNC: 90 U/L
ANION GAP SERPL CALC-SCNC: 17 MMOL/L
APTT BLD: 30.2 SEC
AST SERPL-CCNC: 45 U/L
BASOPHILS # BLD AUTO: 0.02 K/UL
BASOPHILS NFR BLD AUTO: 0.3 %
BILIRUB SERPL-MCNC: 0.4 MG/DL
BUN SERPL-MCNC: 13 MG/DL
CALCIUM SERPL-MCNC: 9.8 MG/DL
CHLORIDE SERPL-SCNC: 102 MMOL/L
CO2 SERPL-SCNC: 24 MMOL/L
CREAT SERPL-MCNC: 0.75 MG/DL
EGFR: 103 ML/MIN/1.73M2
EOSINOPHIL # BLD AUTO: 0.02 K/UL
EOSINOPHIL NFR BLD AUTO: 0.3 %
GLUCOSE SERPL-MCNC: 74 MG/DL
HCT VFR BLD CALC: 47.6 %
HGB BLD-MCNC: 15.7 G/DL
IMM GRANULOCYTES NFR BLD AUTO: 0.8 %
INR PPP: 0.88 RATIO
LDH SERPL-CCNC: 258 U/L
LYMPHOCYTES # BLD AUTO: 0.84 K/UL
LYMPHOCYTES NFR BLD AUTO: 10.5 %
MAGNESIUM SERPL-MCNC: 2.2 MG/DL
MAN DIFF?: NORMAL
MCHC RBC-ENTMCNC: 31.6 PG
MCHC RBC-ENTMCNC: 33 GM/DL
MCV RBC AUTO: 95.8 FL
MONOCYTES # BLD AUTO: 0.58 K/UL
MONOCYTES NFR BLD AUTO: 7.3 %
NEUTROPHILS # BLD AUTO: 6.46 K/UL
NEUTROPHILS NFR BLD AUTO: 80.8 %
PHOSPHATE SERPL-MCNC: 4 MG/DL
PLATELET # BLD AUTO: 308 K/UL
POTASSIUM SERPL-SCNC: 3.6 MMOL/L
PROT SERPL-MCNC: 6.9 G/DL
PT BLD: 10 SEC
RBC # BLD: 4.97 M/UL
RBC # FLD: 13.9 %
SODIUM SERPL-SCNC: 143 MMOL/L
WBC # FLD AUTO: 7.98 K/UL

## 2024-07-02 ENCOUNTER — APPOINTMENT (OUTPATIENT)
Dept: NEUROSURGERY | Facility: CLINIC | Age: 59
End: 2024-07-02
Payer: SUBSIDIZED

## 2024-07-02 DIAGNOSIS — C71.9 MALIGNANT NEOPLASM OF BRAIN, UNSPECIFIED: ICD-10-CM

## 2024-07-02 DIAGNOSIS — Z00.6 ENCOUNTER FOR EXAMINATION FOR NORMAL COMPARISON AND CONTROL IN CLINICAL RESEARCH PROGRAM: ICD-10-CM

## 2024-07-02 PROCEDURE — 99215 OFFICE O/P EST HI 40 MIN: CPT

## 2024-07-08 LAB
ALBUMIN SERPL ELPH-MCNC: 4.1 G/DL
ALP BLD-CCNC: 113 U/L
ALT SERPL-CCNC: 120 U/L
AST SERPL-CCNC: 63 U/L
BILIRUB DIRECT SERPL-MCNC: 0.2 MG/DL
BILIRUB INDIRECT SERPL-MCNC: 0.3 MG/DL
BILIRUB SERPL-MCNC: 0.5 MG/DL
PROT SERPL-MCNC: 6.4 G/DL

## 2024-07-16 ENCOUNTER — APPOINTMENT (OUTPATIENT)
Dept: NEUROSURGERY | Facility: CLINIC | Age: 59
End: 2024-07-16

## 2024-07-24 ENCOUNTER — APPOINTMENT (OUTPATIENT)
Dept: NEUROLOGY | Facility: CLINIC | Age: 59
End: 2024-07-24
Payer: SUBSIDIZED

## 2024-07-24 VITALS
WEIGHT: 210 LBS | DIASTOLIC BLOOD PRESSURE: 89 MMHG | HEIGHT: 70 IN | OXYGEN SATURATION: 97 % | BODY MASS INDEX: 30.06 KG/M2 | HEART RATE: 84 BPM | SYSTOLIC BLOOD PRESSURE: 134 MMHG | RESPIRATION RATE: 16 BRPM | TEMPERATURE: 98.2 F

## 2024-07-24 PROCEDURE — 99215 OFFICE O/P EST HI 40 MIN: CPT

## 2024-07-24 RX ORDER — TEMOZOLOMIDE 140 MG/1
140 CAPSULE ORAL AT BEDTIME
Qty: 15 | Refills: 0 | Status: ACTIVE | COMMUNITY
Start: 2024-07-24 | End: 1900-01-01

## 2024-07-24 RX ORDER — TEMOZOLOMIDE 5 MG/1
5 CAPSULE ORAL
Qty: 5 | Refills: 0 | Status: ACTIVE | COMMUNITY
Start: 2024-07-24 | End: 1900-01-01

## 2024-07-24 NOTE — HISTORY OF PRESENT ILLNESS
[FreeTextEntry1] : NEURO-ONCOLOGY  This 59 year old right handed man is in follow up regarding glioblastoma  He presented in 12/23 with headaches and memory loss, with confusion.  He developed left facial weakness.  He was found to have a right frontal and insular cystic and enhancing lesion. CTC AP was unrevealing.  Tumor was subtotally resected by Dr. Deleon on 12/29/23, revealing glioblastoma, IDH wt, MGMT methylated, Caris with EGFR amplification, TERT, RONA and MDM2 mutations, TMB 2.  He started chemoRT, but was admitted with massive progression and had resection of right frontal tumor on 2/14/24.  Completed RT/TMZ on 4/2/24.  [facility].   TMZ C1 150/m2 5/14/24 TMZ C2 200/m2 6/12/24 TMZ C3 200/m2 7/9/24  INTERVAL HISTORY:  No headaches.  Feels well.  CBC stable.   PMH:  He had a number of cutaneous melanomas removed, but without systemic therapy or invasion, last of these was 12 mo ago.  PSH: as above. orthopedic surgery on shoulder. SHX:  Here with daughter Erica, not present is son Fabiano (a podiatrist) and ex wife Tammi. 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 nondrinker FHX:  no brain tumors  Patient/Caregiver provided printed discharge information.

## 2024-07-24 NOTE — DISCUSSION/SUMMARY
[FreeTextEntry1] : Glioblastoma, MGMT methylated.  ChemoRT interrupted by early POD, s/p resection.  Completed chemoRT.   Now on Alpheus study.  Doing well.   TUMOR:  Prescribed C4  dose escalatedTMZ 200/m2 to begin 8/6/24 after C4 U/S.  MRI per protocol, likely next month  BRAIN EDEMA Dex  1 daily no taper given scan  SUPPORTIVE Keppra to 250 bid  HEME weekly CBC  RTC late August

## 2024-07-24 NOTE — DATA REVIEWED
[de-identified] : Reviewed pre and post operative scans from 12/23 showing subtotal resection of cystic enhancing right frontal and insular lesion, there are satellite foci of enhancement that remain, markedly worse on 2/12/24 MRI with 2/13/24 MRI showing extensive resection of right frontal enhancement, stable on 3/19/24 MRI, 4/24 MRI with stable enhancement and FLAIR,6/24 MRi with stable enhancement and mildly increased high right frontal FLAIR  No progressive tumor or edema on 2/27 CT head

## 2024-07-25 ENCOUNTER — APPOINTMENT (OUTPATIENT)
Dept: NEUROSURGERY | Facility: CLINIC | Age: 59
End: 2024-07-25

## 2024-07-29 LAB
ALBUMIN SERPL ELPH-MCNC: 4.4 G/DL
ALP BLD-CCNC: 116 U/L
ALT SERPL-CCNC: 57 U/L
ANION GAP SERPL CALC-SCNC: 16 MMOL/L
APTT BLD: 33.4 SEC
AST SERPL-CCNC: 30 U/L
BASOPHILS # BLD AUTO: 0.02 K/UL
BASOPHILS NFR BLD AUTO: 0.3 %
BILIRUB SERPL-MCNC: 0.4 MG/DL
BUN SERPL-MCNC: 9 MG/DL
CALCIUM SERPL-MCNC: 9.6 MG/DL
CHLORIDE SERPL-SCNC: 106 MMOL/L
CO2 SERPL-SCNC: 21 MMOL/L
CREAT SERPL-MCNC: 0.79 MG/DL
EGFR: 102 ML/MIN/1.73M2
EOSINOPHIL # BLD AUTO: 0.02 K/UL
EOSINOPHIL NFR BLD AUTO: 0.3 %
GLUCOSE SERPL-MCNC: 103 MG/DL
HCT VFR BLD CALC: 46.7 %
HGB BLD-MCNC: 15.2 G/DL
IMM GRANULOCYTES NFR BLD AUTO: 0.7 %
INR PPP: 0.89 RATIO
LDH SERPL-CCNC: 233 U/L
LYMPHOCYTES # BLD AUTO: 1.08 K/UL
LYMPHOCYTES NFR BLD AUTO: 14.5 %
MAGNESIUM SERPL-MCNC: 2 MG/DL
MAN DIFF?: NORMAL
MCHC RBC-ENTMCNC: 30.2 PG
MCHC RBC-ENTMCNC: 32.5 GM/DL
MCV RBC AUTO: 92.7 FL
MONOCYTES # BLD AUTO: 0.65 K/UL
MONOCYTES NFR BLD AUTO: 8.7 %
NEUTROPHILS # BLD AUTO: 5.65 K/UL
NEUTROPHILS NFR BLD AUTO: 75.5 %
PHOSPHATE SERPL-MCNC: 4.3 MG/DL
PLATELET # BLD AUTO: 389 K/UL
POTASSIUM SERPL-SCNC: 4 MMOL/L
PROT SERPL-MCNC: 6.5 G/DL
PT BLD: 10.1 SEC
RBC # BLD: 5.04 M/UL
RBC # FLD: 13.5 %
SODIUM SERPL-SCNC: 143 MMOL/L
WBC # FLD AUTO: 7.47 K/UL

## 2024-07-30 ENCOUNTER — APPOINTMENT (OUTPATIENT)
Dept: NEUROSURGERY | Facility: CLINIC | Age: 59
End: 2024-07-30
Payer: SUBSIDIZED

## 2024-07-30 DIAGNOSIS — C71.9 MALIGNANT NEOPLASM OF BRAIN, UNSPECIFIED: ICD-10-CM

## 2024-07-30 DIAGNOSIS — Z00.6 ENCOUNTER FOR EXAMINATION FOR NORMAL COMPARISON AND CONTROL IN CLINICAL RESEARCH PROGRAM: ICD-10-CM

## 2024-07-30 DIAGNOSIS — I10 ESSENTIAL (PRIMARY) HYPERTENSION: ICD-10-CM

## 2024-07-30 PROCEDURE — 99215 OFFICE O/P EST HI 40 MIN: CPT

## 2024-07-30 RX ORDER — HYDROCHLOROTHIAZIDE 25 MG/1
25 TABLET ORAL DAILY
Qty: 30 | Refills: 0 | Status: ACTIVE | COMMUNITY
Start: 2024-07-30 | End: 1900-01-01

## 2024-08-01 NOTE — REASON FOR VISIT
[Follow-Up: _____] : a [unfilled] follow-up visit [Family Member] : family member [FreeTextEntry1] : Fermín Harmon is a 59-year-old right-handed male with a past medical history of HLD and melanoma resection x3. On 12/23/24 he presented with confusion, forgetfulness, right facial droop. Tumor was biopsied and debulked by Dr. Deleon on 12/29/23, revealing glioblastoma, IDH wt, MGMT methylated. Prior to initiation of chem/RT underwent a right frontal craniotomy on 2/14/24 without progression of disease. Completed RT/TMZ on 4/3/24.   He consented to participate in the Alpheus trial on 5/1/2024 Reconsented 7/2/2024 5/7/2024 C1D1 5/9/2024 C1D3 5/14/2024 C1 D8 05/21/2024 C1D15 6/5/2024 C2D1 6/19/2024 C2D15 7/2/2024 C3D1  Today 7/30/2024 C4 D1 he reports feeling well and offers no c/o NPO after midnight  0630-Pre 5ALA VS /92, HR 77 RR 16, Temp 98.4 Height 5'10 Weight 207  Exam 0632- A+OX4, PERRL EOMI, speech clear, face symmetrical, WALKER 5/5, SILT, gait steady without assistive device, word recall 3/3 but reports some difficulty remembering details i.e. timing of appointments etc. (unchanged) skin and scalp intact.  KPS 80 ECOG 1  0636-5ALA  1215- Pretreatment   /83, HR 93 97.7, RR 16 Neuro exam unchanged. KPS 80 ECOG 1  1545- immediate posttreatment  /95 HR 84 RR 16 Temp 98.3 Neuro exam unchanged KPS 80 ECOG 1  1645- 1-hour posttreatment  VS:138/90, HR 76, RR 16, 98.1 Neuro exam unchanged KPS 80 ECOG 1 pt tolerated treatment well.

## 2024-08-01 NOTE — ASSESSMENT
[FreeTextEntry1] : Mr. Kovacs is a 59-year-old male s/p biopsy and debulking on 12/29/23- Path GBM, IDH wt, MGMT methylated. On 2/14/2024 underwent a craniotomy without progression of disease. He completed RT/TMZ on 4/3/2024.  Discussion: Avoid bright light for 24 hours Labs as per protocol Maintenace TMZ to start on 8/6 as discussed with Dr. Calix HCTZ ordered for hypertension (was on med prior to surgery)- follow up with PCP. Contact office with any questions or concerns.

## 2024-08-04 LAB
ALBUMIN SERPL ELPH-MCNC: 4.5 G/DL
ALP BLD-CCNC: 129 U/L
ALT SERPL-CCNC: 142 U/L
AST SERPL-CCNC: 51 U/L
BILIRUB DIRECT SERPL-MCNC: 0.2 MG/DL
BILIRUB INDIRECT SERPL-MCNC: 0.3 MG/DL
BILIRUB SERPL-MCNC: 0.5 MG/DL
PROT SERPL-MCNC: 6.9 G/DL

## 2024-08-15 ENCOUNTER — APPOINTMENT (OUTPATIENT)
Dept: NEUROSURGERY | Facility: CLINIC | Age: 59
End: 2024-08-15
Payer: COMMERCIAL

## 2024-08-15 VITALS
TEMPERATURE: 98.2 F | DIASTOLIC BLOOD PRESSURE: 95 MMHG | HEIGHT: 70 IN | WEIGHT: 210 LBS | BODY MASS INDEX: 30.06 KG/M2 | SYSTOLIC BLOOD PRESSURE: 127 MMHG | HEART RATE: 80 BPM | OXYGEN SATURATION: 95 %

## 2024-08-15 DIAGNOSIS — Z98.890 OTHER SPECIFIED POSTPROCEDURAL STATES: ICD-10-CM

## 2024-08-15 DIAGNOSIS — D49.6 NEOPLASM OF UNSPECIFIED BEHAVIOR OF BRAIN: ICD-10-CM

## 2024-08-15 PROCEDURE — 99212 OFFICE O/P EST SF 10 MIN: CPT

## 2024-08-15 RX ORDER — LEVETIRACETAM 250 MG/1
250 TABLET, FILM COATED ORAL
Refills: 0 | Status: ACTIVE | COMMUNITY

## 2024-08-15 NOTE — PHYSICAL EXAM
[FreeTextEntry1] : Patient is awake and alert. Accompanied by family member.  Well appearing in no acute distress Following simple commands Facial sensation intact V1,V2,V3 Face symmetric with normal eye closure and smile, Tongue midline, hearing grossly intact, speech clear No drift of upper extremities 5/5 Right upper extremity 5/5 Left upper extremity 5/5 Right lower extremity 5/5 Left lower extremity Sensation grossly intact to light touch on all extremities Incision is well healed  Yes

## 2024-08-15 NOTE — DATA REVIEWED
[de-identified] : MRI Head W/ and W/o Contrast 6/25/24: IMPRESSION: Redemonstration of right pterional craniotomy. Increased dural thickening and enhancement subjacent to the craniotomy, now measuring 4.5 cm in greatest thickness.  Similar linear enhancement extending from the right anterior frontal region to the surgical cavity. Similar decreased thin enhancement of the anterior portion of the surgical cavity. Similar predominantly thin enhancement of the superior aspect of the surgical cavity.  Nodular enhancement along the posterior aspect of the cavity currently measures 9 x 4 mm, previously measuring 8 x 5 mm.  Decreased size of nodular enhancement in the inferolateral frontal lobe, currently 6 x 6 mm, previously 8 x 8 mm (AP by TV).  New 5 x 3 focus of enhancement along the right lateral temporal lobe (10-16).  Enhancing lesions may represent enhancing neoplasm and/or posttreatment changes.  Similar nonspecific T2 and FLAIR hyperintense signal surrounding the surgical cavity in the right frontal and right anterior temporal lobes, extending to the right basal ganglia, right insular/subinsular region, right hypothalamus, and right optic tract. This may represent nonenhancing neoplasm and/or posttreatment changes.  MR perfusion demonstrates decreased relative cerebral blood volume in the region of the surgical cavity and small enhancing lesions compared to the presumably normal contralateral side, suggesting the presence of posttreatment changes. The presence of enhancing neoplasm is not excluded..

## 2024-08-15 NOTE — ASSESSMENT
[FreeTextEntry1] : Mr. Fabiano Kovacs is a very pleasant 58-year-old male with a history of hypertension, hyperlipidemia, skin melanoma resected 3 times previously (last about 1 year ago) who presented with several months of altered mental status, repetitive speech, blurry vision, worsening memory, and headaches found to have a right frontal contrast-enhancing lesion and underwent resection on December 29, 2023 with regrowth of tumor requiring repeat resection on February 13, 2024. He returns today for follow-up and is overall doing well. We are pleased with how he is doing. Mr. Kovacs will continue with treatment as directed by neurooncology. He has an MRI Brain scheduled on August 21st, 2024. We will see him back in 3 months. He and his family know to call my office with any issues or concerns. All questions answered.  Plan: - Continue medications per Oncology Dr. Calix - Will review MRI Brain on 8/21/24 when it is completed  - Follow up in 3 months

## 2024-08-15 NOTE — HISTORY OF PRESENT ILLNESS
[FreeTextEntry1] : Mr. Fabiano Kovacs is a very pleasant 59-year-old male with a history of hypertension, hyperlipidemia, skin melanoma resected 3 times previously (last time about 1 year ago) who presented to [facility] ED with several months of altered mental status, repetitive speech, blurry vision, worsening memory, and headaches & was subsequently found to have a right frontal contrast-enhancing lesion and underwent resection on December 29, 2023 with new tumor requiring repeat resection on February 13, 2024. He returns today for a follow-up accompanied by his son and is overall doing well. He has intermittent headaches, but they are mild. He has been to [place] about 3-4 times the past few months. He has previously completed chemotherapy and radiation, but recently started chemotherapy. He is enrolled in a Clinical Trial which he reports is going well. His son states he will have some memory issues, but it has not drastically changed. No issues with the incision. Denies any new neurological symptoms.   He is taking 1mg of Dexamethasone daily. His next MRI is August 21st.

## 2024-08-21 ENCOUNTER — OUTPATIENT (OUTPATIENT)
Dept: OUTPATIENT SERVICES | Facility: HOSPITAL | Age: 59
LOS: 1 days | End: 2024-08-21
Payer: COMMERCIAL

## 2024-08-21 ENCOUNTER — APPOINTMENT (OUTPATIENT)
Dept: NEUROSURGERY | Facility: CLINIC | Age: 59
End: 2024-08-21
Payer: SUBSIDIZED

## 2024-08-21 ENCOUNTER — APPOINTMENT (OUTPATIENT)
Dept: MRI IMAGING | Facility: IMAGING CENTER | Age: 59
End: 2024-08-21

## 2024-08-21 ENCOUNTER — RESULT REVIEW (OUTPATIENT)
Age: 59
End: 2024-08-21

## 2024-08-21 ENCOUNTER — OUTPATIENT (OUTPATIENT)
Dept: OUTPATIENT SERVICES | Facility: HOSPITAL | Age: 59
LOS: 1 days | Discharge: HOME | End: 2024-08-21

## 2024-08-21 ENCOUNTER — APPOINTMENT (OUTPATIENT)
Dept: HEMATOLOGY ONCOLOGY | Facility: CLINIC | Age: 59
End: 2024-08-21

## 2024-08-21 ENCOUNTER — APPOINTMENT (OUTPATIENT)
Dept: NEUROLOGY | Facility: CLINIC | Age: 59
End: 2024-08-21
Payer: COMMERCIAL

## 2024-08-21 VITALS
BODY MASS INDEX: 30.06 KG/M2 | WEIGHT: 210 LBS | OXYGEN SATURATION: 96 % | HEIGHT: 70 IN | HEART RATE: 85 BPM | SYSTOLIC BLOOD PRESSURE: 130 MMHG | DIASTOLIC BLOOD PRESSURE: 89 MMHG | RESPIRATION RATE: 16 BRPM | TEMPERATURE: 98.6 F

## 2024-08-21 DIAGNOSIS — Z98.890 OTHER SPECIFIED POSTPROCEDURAL STATES: Chronic | ICD-10-CM

## 2024-08-21 DIAGNOSIS — C71.9 MALIGNANT NEOPLASM OF BRAIN, UNSPECIFIED: ICD-10-CM

## 2024-08-21 DIAGNOSIS — D49.6 NEOPLASM OF UNSPECIFIED BEHAVIOR OF BRAIN: ICD-10-CM

## 2024-08-21 LAB
ALBUMIN SERPL ELPH-MCNC: 4.3 G/DL
ALP BLD-CCNC: 116 U/L
ALT SERPL-CCNC: 86 U/L
ANION GAP SERPL CALC-SCNC: 15 MMOL/L
AST SERPL-CCNC: 47 U/L
BASOPHILS # BLD AUTO: 0.01 K/UL — SIGNIFICANT CHANGE UP (ref 0–0.2)
BASOPHILS NFR BLD AUTO: 0.2 % — SIGNIFICANT CHANGE UP (ref 0–2)
BILIRUB SERPL-MCNC: 0.5 MG/DL
BUN SERPL-MCNC: 12 MG/DL
CALCIUM SERPL-MCNC: 10.1 MG/DL
CHLORIDE SERPL-SCNC: 100 MMOL/L
CO2 SERPL-SCNC: 26 MMOL/L
CREAT SERPL-MCNC: 0.76 MG/DL
EGFR: 103 ML/MIN/1.73M2
EOSINOPHIL # BLD AUTO: 0.01 K/UL — SIGNIFICANT CHANGE UP (ref 0–0.5)
EOSINOPHIL NFR BLD AUTO: 0.2 % — SIGNIFICANT CHANGE UP (ref 0–6)
GLUCOSE SERPL-MCNC: 121 MG/DL
HCT VFR BLD CALC: 45.4 % — SIGNIFICANT CHANGE UP (ref 39–50)
HGB BLD-MCNC: 15.8 G/DL — SIGNIFICANT CHANGE UP (ref 13–17)
IMM GRANULOCYTES NFR BLD AUTO: 0.8 % — SIGNIFICANT CHANGE UP (ref 0–0.9)
LYMPHOCYTES # BLD AUTO: 0.61 K/UL — LOW (ref 1–3.3)
LYMPHOCYTES # BLD AUTO: 9.3 % — LOW (ref 13–44)
MCHC RBC-ENTMCNC: 31.5 PG — SIGNIFICANT CHANGE UP (ref 27–34)
MCHC RBC-ENTMCNC: 34.8 G/DL — SIGNIFICANT CHANGE UP (ref 32–36)
MCV RBC AUTO: 90.4 FL — SIGNIFICANT CHANGE UP (ref 80–100)
MONOCYTES # BLD AUTO: 0.56 K/UL — SIGNIFICANT CHANGE UP (ref 0–0.9)
MONOCYTES NFR BLD AUTO: 8.6 % — SIGNIFICANT CHANGE UP (ref 2–14)
NEUTROPHILS # BLD AUTO: 5.3 K/UL — SIGNIFICANT CHANGE UP (ref 1.8–7.4)
NEUTROPHILS NFR BLD AUTO: 80.9 % — HIGH (ref 43–77)
NRBC # BLD: 0 /100 WBCS — SIGNIFICANT CHANGE UP (ref 0–0)
NRBC BLD-RTO: 0 /100 WBCS — SIGNIFICANT CHANGE UP (ref 0–0)
PLATELET # BLD AUTO: 283 K/UL — SIGNIFICANT CHANGE UP (ref 150–400)
POTASSIUM SERPL-SCNC: 4.2 MMOL/L
PROT SERPL-MCNC: 7.5 G/DL
RBC # BLD: 5.02 M/UL — SIGNIFICANT CHANGE UP (ref 4.2–5.8)
RBC # FLD: 14.3 % — SIGNIFICANT CHANGE UP (ref 10.3–14.5)
SODIUM SERPL-SCNC: 140 MMOL/L
WBC # BLD: 6.54 K/UL — SIGNIFICANT CHANGE UP (ref 3.8–10.5)
WBC # FLD AUTO: 6.54 K/UL — SIGNIFICANT CHANGE UP (ref 3.8–10.5)

## 2024-08-21 PROCEDURE — 70553 MRI BRAIN STEM W/O & W/DYE: CPT | Mod: 26

## 2024-08-21 PROCEDURE — A9585: CPT

## 2024-08-21 PROCEDURE — 70553 MRI BRAIN STEM W/O & W/DYE: CPT

## 2024-08-21 PROCEDURE — 99212 OFFICE O/P EST SF 10 MIN: CPT

## 2024-08-21 PROCEDURE — 99215 OFFICE O/P EST HI 40 MIN: CPT

## 2024-08-21 NOTE — REASON FOR VISIT
[Follow-Up: _____] : a [unfilled] follow-up visit [Family Member] : family member [FreeTextEntry1] : Fermín Harmon is a 59-year-old right-handed male with a past medical history of HLD and melanoma resection x3. On 12/23/24 he presented with confusion, forgetfulness, right facial droop. Tumor was biopsied and debulked by Dr. Deleon on 12/29/23, revealing glioblastoma, IDH wt, MGMT methylated. Prior to initiation of chem/RT underwent a right frontal craniotomy on 2/14/24 without progression of disease. Completed RT/TMZ on 4/3/24.   He consented to participate in the Alpheus trial on 5/1/2024 Reconsented 7/2/2024 5/7/2024 C1D1 5/9/2024 C1D3 5/14/2024 C1 D8 05/21/2024 C1D15 6/5/2024 C2D1 6/19/2024 C2D15 7/2/2024 C3D1 7/30/2024 C4 D1 Today 8/21/2024- reports feeling well. S//p MRI  Exam - A+OX4, PERRL EOMI, speech clear, face symmetrical, WALKER 5/5, SILT, gait steady without assistive device, word recall 3/3 but reports some difficulty remembering details i.e. timing of appointments etc. (unchanged) skin and scalp intact.  KPS 80 ECOG 1

## 2024-08-21 NOTE — DISCUSSION/SUMMARY
[FreeTextEntry1] : Glioblastoma, MGMT methylated.  ChemoRT interrupted by early POD, s/p resection.  Completed chemoRT.   Now on Alpheus study.  Doing well.  Scan stable.   TUMOR:  Prescribed C45 dose escalatedTMZ 200/m2 to begin 9/3/24 after C4 U/S.  MRI per protocol  BRAIN EDEMA Dex  1 daily no taper given scan  SUPPORTIVE Keppra 250 bid, declines taper  HEME weekly CBC  LFTs check LFTs today, may need RUQ sono  RTC 1mo

## 2024-08-21 NOTE — DATA REVIEWED
[de-identified] : Reviewed pre and post operative scans from 12/23 showing subtotal resection of cystic enhancing right frontal and insular lesion, there are satellite foci of enhancement that remain, markedly worse on 2/12/24 MRI with 2/13/24 MRI showing extensive resection of right frontal enhancement, stable on 3/19/24 MRI, 4/24 MRI with stable enhancement and FLAIR,6/24 MRi with stable enhancement and mildly increased high right frontal FLAIR, 8/24 MRI slightly increased frontal FLAIR and no new enhancement stable foci in right temporal lobe  No progressive tumor or edema on 2/27 CT head

## 2024-08-21 NOTE — HISTORY OF PRESENT ILLNESS
[FreeTextEntry1] : NEURO-ONCOLOGY  This 59 year old right handed man is in follow up regarding glioblastoma  He presented in 12/23 with headaches and memory loss, with confusion.  He developed left facial weakness.  He was found to have a right frontal and insular cystic and enhancing lesion. CTC AP was unrevealing.  Tumor was subtotally resected by Dr. Deleon on 12/29/23, revealing glioblastoma, IDH wt, MGMT methylated, Caris with EGFR amplification, TERT, RONA and MDM2 mutations, TMB 2.  He started chemoRT, but was admitted with massive progression and had resection of right frontal tumor on 2/14/24.  Completed RT/TMZ on 4/2/24.  [facility].   TMZ C1 150/m2 5/14/24 TMZ C2 200/m2 6/12/24 TMZ C3 200/m2 7/9/24 TMZ C4 200/m2 8/6/24 TMZ C5 200/m2 9/3/24  INTERVAL HISTORY:  No headaches.  Feels well.  CBC stable but LFTs earlier this month elevated transaminases without any RUQ pain.   MRI brain is stable without any new enhancement, stable right lateral temporal subcm lesion, minimally increased frontal FLAIR.    PMH:  He had a number of cutaneous melanomas removed, but without systemic therapy or invasion, last of these was 12 mo ago.  PSH: as above. orthopedic surgery on shoulder. SHX:  Here with daughter Erica, not present is son Fabiano (a podiatrist) and ex wife Tammi. 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 Nonsmoker. nondrinker FHX:  no brain tumors

## 2024-08-21 NOTE — ASSESSMENT
[FreeTextEntry1] : Mr. Kovacs is a 59-year-old male s/p biopsy and debulking on 12/29/23- Path GBM, IDH wt, MGMT methylated. On 2/14/2024 underwent a craniotomy without progression of disease. He completed RT/TMZ on 4/3/2024.  Discussion: MRI head looks stable Proceed with Cycle 5 on 8/27/2024 TMZ on 9/3 as per Dr. Calix  Labs as per protocol Contact office with any questions or concerns.  .IKarl evaluated the patient with the nurse practitioner Joann Barthelemy and established the plan of care. I personally discuss this patient with the nurse practitioner at the time of the visit. I agree with the assessment and plan as written, unless noted below.

## 2024-08-26 LAB
APTT BLD: 29.6 SEC
INR PPP: 0.86 RATIO
LDH SERPL-CCNC: 235 U/L
MAGNESIUM SERPL-MCNC: 2 MG/DL
PHOSPHATE SERPL-MCNC: 4.4 MG/DL
PT BLD: 9.9 SEC

## 2024-08-27 ENCOUNTER — APPOINTMENT (OUTPATIENT)
Dept: NEUROSURGERY | Facility: CLINIC | Age: 59
End: 2024-08-27
Payer: SUBSIDIZED

## 2024-08-27 DIAGNOSIS — C71.9 MALIGNANT NEOPLASM OF BRAIN, UNSPECIFIED: ICD-10-CM

## 2024-08-27 DIAGNOSIS — Z00.6 ENCOUNTER FOR EXAMINATION FOR NORMAL COMPARISON AND CONTROL IN CLINICAL RESEARCH PROGRAM: ICD-10-CM

## 2024-08-27 PROCEDURE — 99215 OFFICE O/P EST HI 40 MIN: CPT

## 2024-09-03 NOTE — REASON FOR VISIT
[Follow-Up: _____] : a [unfilled] follow-up visit [Family Member] : family member [FreeTextEntry1] : Fermín Harmon is a 59-year-old right-handed male with a past medical history of HLD and melanoma resection x3. On 12/23/24 he presented with confusion, forgetfulness, right facial droop. Tumor was biopsied and debulked by Dr. Deleon on 12/29/23, revealing glioblastoma, IDH wt, MGMT methylated. Prior to initiation of chem/RT underwent a right frontal craniotomy on 2/14/24 without progression of disease. Completed RT/TMZ on 4/3/24.   He consented to participate in the [employer] trial on 5/1/2024 Reconsented 7/2/2024 5/7/2024 C1D1 5/9/2024 C1D3 5/14/2024 C1 D8 05/21/2024 C1D15 6/5/2024 C2D1 6/19/2024 C2D15 7/2/2024 C3D1 7/30/2024 C4 D1   Today 8/27/2024 C5 D1 he reports feeling well and offers no c/o NPO after midnight  0630-Pre 5ALA VS /82, HR 77 RR 16, Temp 98.1 Height 5'10 Weight 205  Exam 0635- A+OX4, PERRL EOMI, speech clear, face symmetrical, WAKLER 5/5, SILT, gait steady without assistive device, word recall 3/3 but reports some difficulty remembering details i.e. timing of appointments etc. (unchanged) skin and scalp intact.  Continued (noted on 5/1/24) mild left upper ext weakness on extension (ex. fastening seat belt, holding the trash) and occasional left lower ext weakness- unchanged   KPS 80 ECOG 1  0640-5ALA  1215- Pretreatment   VS /74 HR96   RR 17 temp 97.6 Neuro exam unchanged. KPS 80 ECOG 1  1600- immediate posttreatment  /90 HR 86 RR 16 Temp 97.8 Neuro exam unchanged KPS 80 ECOG 1  1700- 1-hour posttreatment  VS:130/92, HR 90, RR 16, 97.8 Neuro exam unchanged KPS 80 ECOG 1 pt tolerated treatment well.

## 2024-09-03 NOTE — REASON FOR VISIT
[Follow-Up: _____] : a [unfilled] follow-up visit [Family Member] : family member [FreeTextEntry1] : Fermín Harmon is a 59-year-old right-handed male with a past medical history of HLD and melanoma resection x3. On 12/23/24 he presented with confusion, forgetfulness, right facial droop. Tumor was biopsied and debulked by Dr. eDleon on 12/29/23, revealing glioblastoma, IDH wt, MGMT methylated. Prior to initiation of chem/RT underwent a right frontal craniotomy on 2/14/24 without progression of disease. Completed RT/TMZ on 4/3/24.   He consented to participate in the Alpheus trial on 5/1/2024 Reconsented 7/2/2024 5/7/2024 C1D1 5/9/2024 C1D3 5/14/2024 C1 D8 05/21/2024 C1D15 6/5/2024 C2D1 6/19/2024 C2D15 7/2/2024 C3D1 7/30/2024 C4 D1   Today 8/27/2024 C5 D1 he reports feeling well and offers no c/o NPO after midnight  0630-Pre 5ALA VS /82, HR 77 RR 16, Temp 98.1 Height 5'10 Weight 205  Exam 0635- A+OX4, PERRL EOMI, speech clear, face symmetrical, WALKER 5/5, SILT, gait steady without assistive device, word recall 3/3 but reports some difficulty remembering details i.e. timing of appointments etc. (unchanged) skin and scalp intact.  Continued (noted on 5/1/24) mild left upper ext weakness on extension (ex. fastening seat belt, holding the trash) and occasional left lower ext weakness- unchanged   KPS 80 ECOG 1  0640-5ALA  1215- Pretreatment   VS /74 HR96   RR 17 temp 97.6 Neuro exam unchanged. KPS 80 ECOG 1  1600- immediate posttreatment  /90 HR 86 RR 16 Temp 97.8 Neuro exam unchanged KPS 80 ECOG 1  1700- 1-hour posttreatment  VS:130/92, HR 90, RR 16, 97.8 Neuro exam unchanged KPS 80 ECOG 1 pt tolerated treatment well.

## 2024-09-03 NOTE — ASSESSMENT
[FreeTextEntry1] : Mr. Kovacs is a 59-year-old male s/p biopsy and debulking on 12/29/23- Path GBM, IDH wt, MGMT methylated. On 2/14/2024 underwent a craniotomy without progression of disease. He completed RT/TMZ on 4/3/2024.  Discussion: Avoid bright light for 24 hours Labs as per protocol Maintenace TMZ to start on 9/3 as discussed with Dr. Calix Cycle 6 D1 9/24/2024 Contact office with any questions or concerns.

## 2024-09-12 NOTE — ED ADULT NURSE NOTE - AS PAIN REST
As certified below, I, or a nurse practitioner or physician assistant working with me, had a face-to-face encounter that meets the physician face-to-face encounter requirements.
0 (no pain/absence of nonverbal indicators of pain)

## 2024-09-24 ENCOUNTER — APPOINTMENT (OUTPATIENT)
Dept: NEUROSURGERY | Facility: CLINIC | Age: 59
End: 2024-09-24
Payer: SUBSIDIZED

## 2024-09-24 ENCOUNTER — APPOINTMENT (OUTPATIENT)
Dept: NEUROLOGY | Facility: CLINIC | Age: 59
End: 2024-09-24
Payer: COMMERCIAL

## 2024-09-24 VITALS
HEIGHT: 70 IN | OXYGEN SATURATION: 96 % | TEMPERATURE: 98.1 F | WEIGHT: 212 LBS | RESPIRATION RATE: 16 BRPM | BODY MASS INDEX: 30.35 KG/M2

## 2024-09-24 DIAGNOSIS — C71.9 MALIGNANT NEOPLASM OF BRAIN, UNSPECIFIED: ICD-10-CM

## 2024-09-24 DIAGNOSIS — Z00.6 ENCOUNTER FOR EXAMINATION FOR NORMAL COMPARISON AND CONTROL IN CLINICAL RESEARCH PROGRAM: ICD-10-CM

## 2024-09-24 LAB
ALBUMIN SERPL ELPH-MCNC: 4.5 G/DL
ALP BLD-CCNC: 119 U/L
ALT SERPL-CCNC: 67 U/L
ANION GAP SERPL CALC-SCNC: 17 MMOL/L
APTT BLD: 31.7 SEC
AST SERPL-CCNC: 41 U/L
BASOPHILS # BLD AUTO: 0.02 K/UL
BASOPHILS NFR BLD AUTO: 0.3 %
BILIRUB SERPL-MCNC: 0.5 MG/DL
BUN SERPL-MCNC: 12 MG/DL
CALCIUM SERPL-MCNC: 9.8 MG/DL
CHLORIDE SERPL-SCNC: 100 MMOL/L
CO2 SERPL-SCNC: 26 MMOL/L
CREAT SERPL-MCNC: 0.82 MG/DL
EGFR: 101 ML/MIN/1.73M2
EOSINOPHIL # BLD AUTO: 0.07 K/UL
EOSINOPHIL NFR BLD AUTO: 1.1 %
GLUCOSE SERPL-MCNC: 138 MG/DL
HCT VFR BLD CALC: 45 %
HGB BLD-MCNC: 15.6 G/DL
IMM GRANULOCYTES NFR BLD AUTO: 0.6 %
INR PPP: 0.86 RATIO
LDH SERPL-CCNC: 222 U/L
LYMPHOCYTES # BLD AUTO: 1 K/UL
LYMPHOCYTES NFR BLD AUTO: 15.2 %
MAGNESIUM SERPL-MCNC: 2.1 MG/DL
MAN DIFF?: NORMAL
MCHC RBC-ENTMCNC: 32.3 PG
MCHC RBC-ENTMCNC: 34.7 GM/DL
MCV RBC AUTO: 93.2 FL
MONOCYTES # BLD AUTO: 0.47 K/UL
MONOCYTES NFR BLD AUTO: 7.2 %
NEUTROPHILS # BLD AUTO: 4.96 K/UL
NEUTROPHILS NFR BLD AUTO: 75.6 %
PHOSPHATE SERPL-MCNC: 3.4 MG/DL
PLATELET # BLD AUTO: 281 K/UL
POTASSIUM SERPL-SCNC: 3.7 MMOL/L
PROT SERPL-MCNC: 6.8 G/DL
PT BLD: 9.8 SEC
RBC # BLD: 4.83 M/UL
RBC # FLD: 14.5 %
SODIUM SERPL-SCNC: 143 MMOL/L
WBC # FLD AUTO: 6.56 K/UL

## 2024-09-24 PROCEDURE — 99215 OFFICE O/P EST HI 40 MIN: CPT

## 2024-09-24 NOTE — DATA REVIEWED
[de-identified] : Reviewed pre and post operative scans from 12/23 showing subtotal resection of cystic enhancing right frontal and insular lesion, there are satellite foci of enhancement that remain, markedly worse on 2/12/24 MRI with 2/13/24 MRI showing extensive resection of right frontal enhancement, stable on 3/19/24 MRI, 4/24 MRI with stable enhancement and FLAIR,6/24 MRi with stable enhancement and mildly increased high right frontal FLAIR, 8/24 MRI slightly increased frontal FLAIR and no new enhancement stable foci in right temporal lobe  No progressive tumor or edema on 2/27 CT head

## 2024-09-24 NOTE — DISCUSSION/SUMMARY
[FreeTextEntry1] : Glioblastoma, MGMT methylated.  ChemoRT interrupted by early POD, s/p resection.  Completed chemoRT.   Now on Alpheus study.  Doing well.  Scan stable.   TUMOR:  Prescribed C6 /m2 to begin 10/1/24 after C6 U/S.  MRI per protocol in 1mo   BRAIN EDEMA Dex  1 daily no taper given scan  SUPPORTIVE Keppra 250 bid, declines taper  HEME weekly CBC  RTC 1mo

## 2024-09-24 NOTE — HISTORY OF PRESENT ILLNESS
[FreeTextEntry1] : NEURO-ONCOLOGY  This 59 year old right handed man is in follow up regarding glioblastoma  He presented in 12/23 with headaches and memory loss, with confusion.  He developed left facial weakness.  He was found to have a right frontal and insular cystic and enhancing lesion. CTC AP was unrevealing.  Tumor was subtotally resected by Dr. Deleon on 12/29/23, revealing glioblastoma, IDH wt, MGMT methylated, Caris with EGFR amplification, TERT, RONA and MDM2 mutations, TMB 2.  He started chemoRT, but was admitted with massive progression and had resection of right frontal tumor on 2/14/24.  Completed RT/TMZ on 4/2/24.  [facility].   TMZ C1 150/m2 5/14/24 TMZ C2 200/m2 6/12/24 TMZ C3 200/m2 7/9/24 TMZ C4 200/m2 8/6/24 TMZ C5 200/m2 9/3/24 TMZ C6 200/m2 10/1/24  INTERVAL HISTORY:  No headaches.  Feels well.  LFTs improved.    PMH:  He had a number of cutaneous melanomas removed, but without systemic therapy or invasion, last of these was 12 mo ago.  PSH: as above. orthopedic surgery on shoulder. SHX:  Here with daughter Erica, not present is son Fabiano (a podiatrist) and ex wife Tammi. 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 nondrinker FHX:  no brain tumors

## 2024-09-26 NOTE — ASSESSMENT
[FreeTextEntry1] : Mr. Kovacs is a 59-year-old male s/p biopsy and debulking on 12/29/23- Path GBM, IDH wt, MGMT methylated. On 2/14/2024 underwent a craniotomy without progression of disease. He completed RT/TMZ on 4/3/2024.  Discussion: Avoid bright light for 24 hours Follow up with PCP regarding elevated diastolic pressure  Maintenace TMZ to start on 10/1/2024 as discussed with Dr. Calix MR head w/wo with perfusion on 10/28/2024 Cycle 7 D1 11/4/2024 Contact office with any questions or concerns.

## 2024-09-26 NOTE — REASON FOR VISIT
[Follow-Up: _____] : a [unfilled] follow-up visit [Family Member] : family member [FreeTextEntry1] : Fermín Harmon is a 59-year-old right-handed male with a past medical history of HLD and melanoma resection x3. On 12/23/24 he presented with confusion, forgetfulness, right facial droop. Tumor was biopsied and debulked by Dr. Deleon on 12/29/23, revealing glioblastoma, IDH wt, MGMT methylated. Prior to initiation of chem/RT underwent a right frontal craniotomy on 2/14/24 without progression of disease. Completed RT/TMZ on 4/3/24.   He consented to participate in the Alpheus trial on 5/1/2024 Reconsented 7/2/2024 5/7/2024 C1D1 5/9/2024 C1D3 5/14/2024 C1 D8 05/21/2024 C1D15 6/5/2024 C2D1 6/19/2024 C2D15 7/2/2024 C3D1 7/30/2024 C4 D1 8/27/2024 C5D1  Today 9/24/2024 C6 D1 he reports feeling well and offers no c/o NPO after midnight  0630-Pre 5ALA VS /90, HR 71 RR 16, Temp 98.1 Height 5'10 Weight 210  Exam 0628- A+OX4, PERRL EOMI, speech clear, face symmetrical, WALKER 5/5, SILT, gait steady without assistive device, word recall 3/3 but reports some difficulty remembering details i.e. timing of appointments etc. (unchanged) skin and scalp intact.  Continued (noted on 5/1/24) mild left upper ext weakness on extension (ex. fastening seat belt, holding the trash) and occasional left lower ext weakness- unchanged   KPS 80 ECOG 1  0633-5ALA  1230 Pretreatment   VS /80 , RR 17 temp 97.6 Neuro exam unchanged. KPS 80 ECOG 1  1545- immediate posttreatment  /90 HR 86 RR 16 Temp 98.2 Neuro exam unchanged KPS 80 ECOG 1  1645- 1-hour posttreatment  VS:121/92, HR 87, RR 16, 98.1 repeat 136/81 Neuro exam unchanged KPS 80 ECOG 1 pt tolerated treatment well.

## 2024-09-26 NOTE — REVIEW OF SYSTEMS
[As Noted in HPI] : as noted in HPI [Negative] : Integumentary [Heart Rate Is Fast] : fast heart rate [FreeTextEntry3] : blurry unchanged  [FreeTextEntry5] : at times with exertion

## 2024-10-16 ENCOUNTER — RX RENEWAL (OUTPATIENT)
Age: 59
End: 2024-10-16

## 2024-10-28 ENCOUNTER — OUTPATIENT (OUTPATIENT)
Dept: OUTPATIENT SERVICES | Facility: HOSPITAL | Age: 59
LOS: 1 days | End: 2024-10-28
Payer: COMMERCIAL

## 2024-10-28 ENCOUNTER — APPOINTMENT (OUTPATIENT)
Dept: MRI IMAGING | Facility: IMAGING CENTER | Age: 59
End: 2024-10-28
Payer: COMMERCIAL

## 2024-10-28 ENCOUNTER — APPOINTMENT (OUTPATIENT)
Dept: NEUROLOGY | Facility: CLINIC | Age: 59
End: 2024-10-28
Payer: COMMERCIAL

## 2024-10-28 VITALS
HEART RATE: 85 BPM | HEIGHT: 70 IN | TEMPERATURE: 98.5 F | DIASTOLIC BLOOD PRESSURE: 90 MMHG | SYSTOLIC BLOOD PRESSURE: 137 MMHG | BODY MASS INDEX: 30.78 KG/M2 | WEIGHT: 215 LBS | RESPIRATION RATE: 16 BRPM | OXYGEN SATURATION: 98 %

## 2024-10-28 DIAGNOSIS — Z98.890 OTHER SPECIFIED POSTPROCEDURAL STATES: Chronic | ICD-10-CM

## 2024-10-28 DIAGNOSIS — Z00.8 ENCOUNTER FOR OTHER GENERAL EXAMINATION: ICD-10-CM

## 2024-10-28 PROCEDURE — 70553 MRI BRAIN STEM W/O & W/DYE: CPT

## 2024-10-28 PROCEDURE — 70553 MRI BRAIN STEM W/O & W/DYE: CPT | Mod: 26

## 2024-10-28 PROCEDURE — A9585: CPT

## 2024-10-28 PROCEDURE — 99215 OFFICE O/P EST HI 40 MIN: CPT

## 2024-10-28 RX ORDER — DEXAMETHASONE 0.5 MG/.5MG
0.5 TABLET ORAL DAILY
Qty: 30 | Refills: 5 | Status: ACTIVE | COMMUNITY
Start: 2024-10-28 | End: 1900-01-01

## 2024-11-04 ENCOUNTER — APPOINTMENT (OUTPATIENT)
Dept: NEUROSURGERY | Facility: CLINIC | Age: 59
End: 2024-11-04
Payer: SUBSIDIZED

## 2024-11-04 DIAGNOSIS — Z00.6 ENCOUNTER FOR EXAMINATION FOR NORMAL COMPARISON AND CONTROL IN CLINICAL RESEARCH PROGRAM: ICD-10-CM

## 2024-11-04 LAB
ALBUMIN SERPL ELPH-MCNC: 4.2 G/DL
ALP BLD-CCNC: 114 U/L
ALT SERPL-CCNC: 52 U/L
ANION GAP SERPL CALC-SCNC: 16 MMOL/L
APTT BLD: 32.9 SEC
AST SERPL-CCNC: 31 U/L
BASOPHILS # BLD AUTO: 0.04 K/UL
BASOPHILS NFR BLD AUTO: 0.9 %
BILIRUB SERPL-MCNC: 0.4 MG/DL
BUN SERPL-MCNC: 12 MG/DL
CALCIUM SERPL-MCNC: 9.1 MG/DL
CHLORIDE SERPL-SCNC: 103 MMOL/L
CO2 SERPL-SCNC: 22 MMOL/L
CREAT SERPL-MCNC: 0.76 MG/DL
EGFR: 103 ML/MIN/1.73M2
EOSINOPHIL # BLD AUTO: 0.08 K/UL
EOSINOPHIL NFR BLD AUTO: 1.7 %
GLUCOSE SERPL-MCNC: 183 MG/DL
HCT VFR BLD CALC: 42.4 %
HGB BLD-MCNC: 14.8 G/DL
IMM GRANULOCYTES NFR BLD AUTO: 0.6 %
INR PPP: 0.83 RATIO
LDH SERPL-CCNC: 209 U/L
LYMPHOCYTES # BLD AUTO: 0.91 K/UL
LYMPHOCYTES NFR BLD AUTO: 19.6 %
MAGNESIUM SERPL-MCNC: 1.9 MG/DL
MAN DIFF?: NORMAL
MCHC RBC-ENTMCNC: 32.6 PG
MCHC RBC-ENTMCNC: 34.9 G/DL
MCV RBC AUTO: 93.4 FL
MONOCYTES # BLD AUTO: 0.37 K/UL
MONOCYTES NFR BLD AUTO: 8 %
NEUTROPHILS # BLD AUTO: 3.21 K/UL
NEUTROPHILS NFR BLD AUTO: 69.2 %
PHOSPHATE SERPL-MCNC: 2.8 MG/DL
PLATELET # BLD AUTO: 235 K/UL
POTASSIUM SERPL-SCNC: 3.4 MMOL/L
PROT SERPL-MCNC: 6.5 G/DL
PT BLD: 9.9 SEC
RBC # BLD: 4.54 M/UL
RBC # FLD: 13.7 %
SODIUM SERPL-SCNC: 141 MMOL/L
WBC # FLD AUTO: 4.64 K/UL

## 2024-11-04 PROCEDURE — 99215 OFFICE O/P EST HI 40 MIN: CPT

## 2024-11-14 ENCOUNTER — APPOINTMENT (OUTPATIENT)
Dept: NEUROSURGERY | Facility: CLINIC | Age: 59
End: 2024-11-14
Payer: COMMERCIAL

## 2024-11-14 VITALS
TEMPERATURE: 98.2 F | DIASTOLIC BLOOD PRESSURE: 83 MMHG | SYSTOLIC BLOOD PRESSURE: 96 MMHG | HEART RATE: 94 BPM | HEIGHT: 70 IN | WEIGHT: 210 LBS | BODY MASS INDEX: 30.06 KG/M2 | OXYGEN SATURATION: 95 %

## 2024-11-14 DIAGNOSIS — Z98.890 OTHER SPECIFIED POSTPROCEDURAL STATES: ICD-10-CM

## 2024-11-14 DIAGNOSIS — C71.9 MALIGNANT NEOPLASM OF BRAIN, UNSPECIFIED: ICD-10-CM

## 2024-11-14 DIAGNOSIS — D49.6 NEOPLASM OF UNSPECIFIED BEHAVIOR OF BRAIN: ICD-10-CM

## 2024-11-14 PROCEDURE — 99212 OFFICE O/P EST SF 10 MIN: CPT

## 2024-11-14 RX ORDER — AMLODIPINE BESYLATE 10 MG/1
10 TABLET ORAL
Refills: 0 | Status: ACTIVE | COMMUNITY

## 2024-11-14 RX ORDER — LEVETIRACETAM 250 MG/1
250 TABLET, FILM COATED ORAL
Refills: 0 | Status: ACTIVE | COMMUNITY

## 2024-12-03 ENCOUNTER — APPOINTMENT (OUTPATIENT)
Dept: NEUROSURGERY | Facility: CLINIC | Age: 59
End: 2024-12-03
Payer: SUBSIDIZED

## 2024-12-03 DIAGNOSIS — C71.9 MALIGNANT NEOPLASM OF BRAIN, UNSPECIFIED: ICD-10-CM

## 2024-12-03 DIAGNOSIS — Z00.6 ENCOUNTER FOR EXAMINATION FOR NORMAL COMPARISON AND CONTROL IN CLINICAL RESEARCH PROGRAM: ICD-10-CM

## 2024-12-03 PROCEDURE — 99215 OFFICE O/P EST HI 40 MIN: CPT

## 2024-12-03 NOTE — H&P ADULT - NSICDXPASTMEDICALHX_GEN_ALL_CORE_FT
[facility] care 1900, 12/2, NOC. No acute events, tolerating medications (BP meds held in afternoon per cards), HD scheduled for this morning, needs met   PAST MEDICAL HISTORY:  HLD (hyperlipidemia)     Hypertension     Melanoma

## 2024-12-05 ENCOUNTER — NON-APPOINTMENT (OUTPATIENT)
Age: 59
End: 2024-12-05

## 2024-12-05 ENCOUNTER — APPOINTMENT (OUTPATIENT)
Dept: CARDIOLOGY | Facility: CLINIC | Age: 59
End: 2024-12-05
Payer: COMMERCIAL

## 2024-12-05 VITALS
WEIGHT: 220 LBS | BODY MASS INDEX: 31.5 KG/M2 | DIASTOLIC BLOOD PRESSURE: 76 MMHG | HEIGHT: 70 IN | OXYGEN SATURATION: 97 % | SYSTOLIC BLOOD PRESSURE: 112 MMHG | HEART RATE: 83 BPM

## 2024-12-05 DIAGNOSIS — E78.5 HYPERLIPIDEMIA, UNSPECIFIED: ICD-10-CM

## 2024-12-05 DIAGNOSIS — R06.83 SNORING: ICD-10-CM

## 2024-12-05 DIAGNOSIS — I10 ESSENTIAL (PRIMARY) HYPERTENSION: ICD-10-CM

## 2024-12-05 DIAGNOSIS — Z13.6 ENCOUNTER FOR SCREENING FOR CARDIOVASCULAR DISORDERS: ICD-10-CM

## 2024-12-05 DIAGNOSIS — R06.09 OTHER FORMS OF DYSPNEA: ICD-10-CM

## 2024-12-05 PROCEDURE — 93000 ELECTROCARDIOGRAM COMPLETE: CPT

## 2024-12-05 PROCEDURE — G2211 COMPLEX E/M VISIT ADD ON: CPT | Mod: NC

## 2024-12-05 PROCEDURE — 99204 OFFICE O/P NEW MOD 45 MIN: CPT

## 2024-12-23 ENCOUNTER — OUTPATIENT (OUTPATIENT)
Dept: OUTPATIENT SERVICES | Facility: HOSPITAL | Age: 59
LOS: 1 days | End: 2024-12-23
Payer: COMMERCIAL

## 2024-12-23 ENCOUNTER — APPOINTMENT (OUTPATIENT)
Dept: MRI IMAGING | Facility: IMAGING CENTER | Age: 59
End: 2024-12-23
Payer: COMMERCIAL

## 2024-12-23 ENCOUNTER — APPOINTMENT (OUTPATIENT)
Dept: NEUROLOGY | Facility: CLINIC | Age: 59
End: 2024-12-23
Payer: COMMERCIAL

## 2024-12-23 VITALS
OXYGEN SATURATION: 98 % | TEMPERATURE: 98.7 F | HEIGHT: 70 IN | HEART RATE: 85 BPM | DIASTOLIC BLOOD PRESSURE: 90 MMHG | BODY MASS INDEX: 31.92 KG/M2 | SYSTOLIC BLOOD PRESSURE: 139 MMHG | WEIGHT: 223 LBS

## 2024-12-23 DIAGNOSIS — C71.9 MALIGNANT NEOPLASM OF BRAIN, UNSPECIFIED: ICD-10-CM

## 2024-12-23 DIAGNOSIS — Z00.6 ENCOUNTER FOR EXAMINATION FOR NORMAL COMPARISON AND CONTROL IN CLINICAL RESEARCH PROGRAM: ICD-10-CM

## 2024-12-23 DIAGNOSIS — Z98.890 OTHER SPECIFIED POSTPROCEDURAL STATES: Chronic | ICD-10-CM

## 2024-12-23 PROCEDURE — A9585: CPT

## 2024-12-23 PROCEDURE — 70553 MRI BRAIN STEM W/O & W/DYE: CPT

## 2024-12-23 PROCEDURE — 70553 MRI BRAIN STEM W/O & W/DYE: CPT | Mod: 26

## 2024-12-23 PROCEDURE — 99214 OFFICE O/P EST MOD 30 MIN: CPT

## 2024-12-25 PROBLEM — F10.90 ALCOHOL USE: Status: ACTIVE | Noted: 2023-12-27

## 2025-01-04 ENCOUNTER — LABORATORY RESULT (OUTPATIENT)
Age: 60
End: 2025-01-04

## 2025-01-06 LAB
ALBUMIN SERPL ELPH-MCNC: 4.5 G/DL
ALP BLD-CCNC: 122 U/L
ALT SERPL-CCNC: 49 U/L
ANION GAP SERPL CALC-SCNC: 14 MMOL/L
APTT BLD: 37.8 SEC
AST SERPL-CCNC: 27 U/L
BASOPHILS # BLD AUTO: 0.05 K/UL
BASOPHILS NFR BLD AUTO: 0.9 %
BILIRUB SERPL-MCNC: 0.4 MG/DL
BUN SERPL-MCNC: 12 MG/DL
CALCIUM SERPL-MCNC: 9.8 MG/DL
CHLORIDE SERPL-SCNC: 105 MMOL/L
CO2 SERPL-SCNC: 24 MMOL/L
CREAT SERPL-MCNC: 0.74 MG/DL
EGFR: 104 ML/MIN/1.73M2
EOSINOPHIL # BLD AUTO: 0.08 K/UL
EOSINOPHIL NFR BLD AUTO: 1.4 %
GLUCOSE SERPL-MCNC: 116 MG/DL
HCT VFR BLD CALC: 45.7 %
HGB BLD-MCNC: 15.9 G/DL
IMM GRANULOCYTES NFR BLD AUTO: 0.5 %
INR PPP: 0.89 RATIO
LDH SERPL-CCNC: 224 U/L
LYMPHOCYTES # BLD AUTO: 1.27 K/UL
LYMPHOCYTES NFR BLD AUTO: 22.8 %
MAGNESIUM SERPL-MCNC: 1.9 MG/DL
MAN DIFF?: NORMAL
MCHC RBC-ENTMCNC: 31.2 PG
MCHC RBC-ENTMCNC: 34.8 G/DL
MCV RBC AUTO: 89.8 FL
MONOCYTES # BLD AUTO: 0.62 K/UL
MONOCYTES NFR BLD AUTO: 11.1 %
NEUTROPHILS # BLD AUTO: 3.53 K/UL
NEUTROPHILS NFR BLD AUTO: 63.3 %
PHOSPHATE SERPL-MCNC: 3.5 MG/DL
PLATELET # BLD AUTO: 316 K/UL
POTASSIUM SERPL-SCNC: 3.4 MMOL/L
PROT SERPL-MCNC: 7 G/DL
PT BLD: 10.6 SEC
RBC # BLD: 5.09 M/UL
RBC # FLD: 12.5 %
SODIUM SERPL-SCNC: 143 MMOL/L
WBC # FLD AUTO: 5.58 K/UL

## 2025-01-07 ENCOUNTER — APPOINTMENT (OUTPATIENT)
Dept: NEUROSURGERY | Facility: CLINIC | Age: 60
End: 2025-01-07

## 2025-01-07 DIAGNOSIS — C71.9 MALIGNANT NEOPLASM OF BRAIN, UNSPECIFIED: ICD-10-CM

## 2025-01-07 DIAGNOSIS — Z00.6 ENCOUNTER FOR EXAMINATION FOR NORMAL COMPARISON AND CONTROL IN CLINICAL RESEARCH PROGRAM: ICD-10-CM

## 2025-01-21 ENCOUNTER — APPOINTMENT (OUTPATIENT)
Dept: CT IMAGING | Facility: CLINIC | Age: 60
End: 2025-01-21
Payer: COMMERCIAL

## 2025-01-21 PROCEDURE — 75574 CT ANGIO HRT W/3D IMAGE: CPT

## 2025-01-22 ENCOUNTER — RESULT REVIEW (OUTPATIENT)
Age: 60
End: 2025-01-22

## 2025-01-22 DIAGNOSIS — I25.10 ATHEROSCLEROTIC HEART DISEASE OF NATIVE CORONARY ARTERY W/OUT ANGINA PECTORIS: ICD-10-CM

## 2025-01-22 PROCEDURE — 75580 N-INVAS EST C FFR SW ALY CTA: CPT

## 2025-02-04 ENCOUNTER — APPOINTMENT (OUTPATIENT)
Dept: NEUROSURGERY | Facility: CLINIC | Age: 60
End: 2025-02-04
Payer: SUBSIDIZED

## 2025-02-04 DIAGNOSIS — Z00.6 ENCOUNTER FOR EXAMINATION FOR NORMAL COMPARISON AND CONTROL IN CLINICAL RESEARCH PROGRAM: ICD-10-CM

## 2025-02-04 PROCEDURE — 99215 OFFICE O/P EST HI 40 MIN: CPT

## 2025-02-06 LAB
ALBUMIN SERPL ELPH-MCNC: 4.5 G/DL
ALP BLD-CCNC: 122 U/L
ALT SERPL-CCNC: 51 U/L
ANION GAP SERPL CALC-SCNC: 21 MMOL/L
APTT BLD: 37.5 SEC
AST SERPL-CCNC: 38 U/L
BASOPHILS # BLD AUTO: 0.05 K/UL
BASOPHILS NFR BLD AUTO: 0.9 %
BILIRUB SERPL-MCNC: 0.4 MG/DL
BUN SERPL-MCNC: 10 MG/DL
CALCIUM SERPL-MCNC: 9.7 MG/DL
CHLORIDE SERPL-SCNC: 100 MMOL/L
CO2 SERPL-SCNC: 21 MMOL/L
CREAT SERPL-MCNC: 0.85 MG/DL
EGFR: 99 ML/MIN/1.73M2
EOSINOPHIL # BLD AUTO: 0.08 K/UL
EOSINOPHIL NFR BLD AUTO: 1.5 %
GLUCOSE SERPL-MCNC: 104 MG/DL
HCT VFR BLD CALC: 47.1 %
HGB BLD-MCNC: 16 G/DL
IMM GRANULOCYTES NFR BLD AUTO: 0.4 %
INR PPP: 0.89 RATIO
LDH SERPL-CCNC: 276 U/L
LYMPHOCYTES # BLD AUTO: 0.94 K/UL
LYMPHOCYTES NFR BLD AUTO: 17.8 %
MAGNESIUM SERPL-MCNC: 2.1 MG/DL
MAN DIFF?: NORMAL
MCHC RBC-ENTMCNC: 30.8 PG
MCHC RBC-ENTMCNC: 34 G/DL
MCV RBC AUTO: 90.6 FL
MONOCYTES # BLD AUTO: 0.57 K/UL
MONOCYTES NFR BLD AUTO: 10.8 %
NEUTROPHILS # BLD AUTO: 3.63 K/UL
NEUTROPHILS NFR BLD AUTO: 68.6 %
PHOSPHATE SERPL-MCNC: 3.7 MG/DL
PLATELET # BLD AUTO: 291 K/UL
POTASSIUM SERPL-SCNC: 3.8 MMOL/L
PROT SERPL-MCNC: 7.1 G/DL
PT BLD: 10.5 SEC
RBC # BLD: 5.2 M/UL
RBC # FLD: 12.9 %
SODIUM SERPL-SCNC: 143 MMOL/L
WBC # FLD AUTO: 5.29 K/UL

## 2025-02-13 ENCOUNTER — NON-APPOINTMENT (OUTPATIENT)
Age: 60
End: 2025-02-13

## 2025-02-13 ENCOUNTER — APPOINTMENT (OUTPATIENT)
Dept: NEUROSURGERY | Facility: CLINIC | Age: 60
End: 2025-02-13
Payer: COMMERCIAL

## 2025-02-13 VITALS
SYSTOLIC BLOOD PRESSURE: 130 MMHG | BODY MASS INDEX: 31.5 KG/M2 | HEIGHT: 70 IN | HEART RATE: 77 BPM | WEIGHT: 220 LBS | OXYGEN SATURATION: 94 % | DIASTOLIC BLOOD PRESSURE: 84 MMHG | TEMPERATURE: 97.2 F

## 2025-02-13 DIAGNOSIS — C71.9 MALIGNANT NEOPLASM OF BRAIN, UNSPECIFIED: ICD-10-CM

## 2025-02-13 DIAGNOSIS — D49.6 NEOPLASM OF UNSPECIFIED BEHAVIOR OF BRAIN: ICD-10-CM

## 2025-02-13 DIAGNOSIS — Z98.890 OTHER SPECIFIED POSTPROCEDURAL STATES: ICD-10-CM

## 2025-02-13 PROCEDURE — 99212 OFFICE O/P EST SF 10 MIN: CPT

## 2025-02-20 ENCOUNTER — APPOINTMENT (OUTPATIENT)
Dept: CARDIOLOGY | Facility: CLINIC | Age: 60
End: 2025-02-20
Payer: COMMERCIAL

## 2025-02-20 VITALS
DIASTOLIC BLOOD PRESSURE: 74 MMHG | OXYGEN SATURATION: 94 % | HEIGHT: 70 IN | SYSTOLIC BLOOD PRESSURE: 124 MMHG | HEART RATE: 81 BPM | WEIGHT: 220 LBS | BODY MASS INDEX: 31.5 KG/M2

## 2025-02-20 DIAGNOSIS — F10.90 ALCOHOL USE, UNSPECIFIED, UNCOMPLICATED: ICD-10-CM

## 2025-02-20 DIAGNOSIS — I10 ESSENTIAL (PRIMARY) HYPERTENSION: ICD-10-CM

## 2025-02-20 DIAGNOSIS — Z78.9 OTHER SPECIFIED HEALTH STATUS: ICD-10-CM

## 2025-02-20 DIAGNOSIS — I25.10 ATHEROSCLEROTIC HEART DISEASE OF NATIVE CORONARY ARTERY W/OUT ANGINA PECTORIS: ICD-10-CM

## 2025-02-20 DIAGNOSIS — E78.5 HYPERLIPIDEMIA, UNSPECIFIED: ICD-10-CM

## 2025-02-20 PROCEDURE — G2211 COMPLEX E/M VISIT ADD ON: CPT | Mod: NC

## 2025-02-20 PROCEDURE — 99214 OFFICE O/P EST MOD 30 MIN: CPT

## 2025-02-24 ENCOUNTER — APPOINTMENT (OUTPATIENT)
Dept: MRI IMAGING | Facility: IMAGING CENTER | Age: 60
End: 2025-02-24
Payer: COMMERCIAL

## 2025-02-24 ENCOUNTER — APPOINTMENT (OUTPATIENT)
Dept: NEUROLOGY | Facility: CLINIC | Age: 60
End: 2025-02-24
Payer: COMMERCIAL

## 2025-02-24 ENCOUNTER — OUTPATIENT (OUTPATIENT)
Dept: OUTPATIENT SERVICES | Facility: HOSPITAL | Age: 60
LOS: 1 days | End: 2025-02-24
Payer: COMMERCIAL

## 2025-02-24 VITALS
SYSTOLIC BLOOD PRESSURE: 131 MMHG | DIASTOLIC BLOOD PRESSURE: 94 MMHG | HEART RATE: 91 BPM | RESPIRATION RATE: 16 BRPM | WEIGHT: 224 LBS | BODY MASS INDEX: 32.07 KG/M2 | OXYGEN SATURATION: 96 % | TEMPERATURE: 97.8 F | HEIGHT: 70 IN

## 2025-02-24 DIAGNOSIS — Z98.890 OTHER SPECIFIED POSTPROCEDURAL STATES: Chronic | ICD-10-CM

## 2025-02-24 DIAGNOSIS — C71.9 MALIGNANT NEOPLASM OF BRAIN, UNSPECIFIED: ICD-10-CM

## 2025-02-24 PROCEDURE — 70553 MRI BRAIN STEM W/O & W/DYE: CPT

## 2025-02-24 PROCEDURE — 99214 OFFICE O/P EST MOD 30 MIN: CPT

## 2025-02-24 PROCEDURE — 70553 MRI BRAIN STEM W/O & W/DYE: CPT | Mod: 26

## 2025-03-04 ENCOUNTER — APPOINTMENT (OUTPATIENT)
Dept: NEUROSURGERY | Facility: CLINIC | Age: 60
End: 2025-03-04
Payer: SUBSIDIZED

## 2025-03-04 DIAGNOSIS — Z00.6 ENCOUNTER FOR EXAMINATION FOR NORMAL COMPARISON AND CONTROL IN CLINICAL RESEARCH PROGRAM: ICD-10-CM

## 2025-03-04 DIAGNOSIS — C71.9 MALIGNANT NEOPLASM OF BRAIN, UNSPECIFIED: ICD-10-CM

## 2025-03-04 PROCEDURE — 99215 OFFICE O/P EST HI 40 MIN: CPT

## 2025-03-04 RX ORDER — HYDROCHLOROTHIAZIDE 25 MG/1
25 TABLET ORAL DAILY
Qty: 30 | Refills: 0 | Status: ACTIVE | COMMUNITY
Start: 2025-03-04 | End: 1900-01-01

## 2025-03-04 NOTE — END OF VISIT
[Time Spent: ___ minutes] : I have spent [unfilled] minutes of time on the encounter. 01-Jan-1976 Average build

## 2025-03-25 ENCOUNTER — APPOINTMENT (OUTPATIENT)
Dept: CARDIOLOGY | Facility: CLINIC | Age: 60
End: 2025-03-25
Payer: COMMERCIAL

## 2025-03-25 PROCEDURE — 93306 TTE W/DOPPLER COMPLETE: CPT

## 2025-04-01 ENCOUNTER — APPOINTMENT (OUTPATIENT)
Dept: NEUROSURGERY | Facility: CLINIC | Age: 60
End: 2025-04-01
Payer: SUBSIDIZED

## 2025-04-01 DIAGNOSIS — Z00.6 ENCOUNTER FOR EXAMINATION FOR NORMAL COMPARISON AND CONTROL IN CLINICAL RESEARCH PROGRAM: ICD-10-CM

## 2025-04-01 DIAGNOSIS — C71.9 MALIGNANT NEOPLASM OF BRAIN, UNSPECIFIED: ICD-10-CM

## 2025-04-01 PROCEDURE — 99215 OFFICE O/P EST HI 40 MIN: CPT

## 2025-04-23 ENCOUNTER — APPOINTMENT (OUTPATIENT)
Dept: MRI IMAGING | Facility: IMAGING CENTER | Age: 60
End: 2025-04-23
Payer: COMMERCIAL

## 2025-04-23 ENCOUNTER — APPOINTMENT (OUTPATIENT)
Dept: NEUROLOGY | Facility: CLINIC | Age: 60
End: 2025-04-23
Payer: COMMERCIAL

## 2025-04-23 ENCOUNTER — OUTPATIENT (OUTPATIENT)
Dept: OUTPATIENT SERVICES | Facility: HOSPITAL | Age: 60
LOS: 1 days | End: 2025-04-23
Payer: COMMERCIAL

## 2025-04-23 VITALS
SYSTOLIC BLOOD PRESSURE: 141 MMHG | DIASTOLIC BLOOD PRESSURE: 94 MMHG | RESPIRATION RATE: 16 BRPM | OXYGEN SATURATION: 97 % | BODY MASS INDEX: 32.07 KG/M2 | WEIGHT: 224 LBS | HEART RATE: 86 BPM | HEIGHT: 70 IN | TEMPERATURE: 98.1 F

## 2025-04-23 DIAGNOSIS — Z98.890 OTHER SPECIFIED POSTPROCEDURAL STATES: ICD-10-CM

## 2025-04-23 DIAGNOSIS — Z00.8 ENCOUNTER FOR OTHER GENERAL EXAMINATION: ICD-10-CM

## 2025-04-23 DIAGNOSIS — C71.9 MALIGNANT NEOPLASM OF BRAIN, UNSPECIFIED: ICD-10-CM

## 2025-04-23 DIAGNOSIS — Z98.890 OTHER SPECIFIED POSTPROCEDURAL STATES: Chronic | ICD-10-CM

## 2025-04-23 PROCEDURE — A9585: CPT

## 2025-04-23 PROCEDURE — 70553 MRI BRAIN STEM W/O & W/DYE: CPT | Mod: 26

## 2025-04-23 PROCEDURE — 99214 OFFICE O/P EST MOD 30 MIN: CPT

## 2025-04-23 PROCEDURE — 70553 MRI BRAIN STEM W/O & W/DYE: CPT

## 2025-04-28 LAB
ALBUMIN SERPL ELPH-MCNC: 4.3 G/DL
ALP BLD-CCNC: 92 U/L
ALT SERPL-CCNC: 35 U/L
ANION GAP SERPL CALC-SCNC: 16 MMOL/L
APTT BLD: 34.5 SEC
AST SERPL-CCNC: 25 U/L
BASOPHILS # BLD AUTO: 0.04 K/UL
BASOPHILS NFR BLD AUTO: 0.8 %
BILIRUB SERPL-MCNC: 0.4 MG/DL
BUN SERPL-MCNC: 11 MG/DL
CALCIUM SERPL-MCNC: 9.4 MG/DL
CHLORIDE SERPL-SCNC: 103 MMOL/L
CO2 SERPL-SCNC: 21 MMOL/L
CREAT SERPL-MCNC: 0.86 MG/DL
EGFRCR SERPLBLD CKD-EPI 2021: 99 ML/MIN/1.73M2
EOSINOPHIL # BLD AUTO: 0.08 K/UL
EOSINOPHIL NFR BLD AUTO: 1.6 %
GLUCOSE SERPL-MCNC: 120 MG/DL
HCT VFR BLD CALC: 42.5 %
HGB BLD-MCNC: 14.9 G/DL
IMM GRANULOCYTES NFR BLD AUTO: 0.2 %
INR PPP: 0.86 RATIO
LDH SERPL-CCNC: 171 U/L
LYMPHOCYTES # BLD AUTO: 1.03 K/UL
LYMPHOCYTES NFR BLD AUTO: 20.5 %
MAGNESIUM SERPL-MCNC: 1.8 MG/DL
MAN DIFF?: NORMAL
MCHC RBC-ENTMCNC: 29.6 PG
MCHC RBC-ENTMCNC: 35.1 G/DL
MCV RBC AUTO: 84.5 FL
MONOCYTES # BLD AUTO: 0.6 K/UL
MONOCYTES NFR BLD AUTO: 12 %
NEUTROPHILS # BLD AUTO: 3.26 K/UL
NEUTROPHILS NFR BLD AUTO: 64.9 %
PHOSPHATE SERPL-MCNC: 2.8 MG/DL
PLATELET # BLD AUTO: 318 K/UL
POTASSIUM SERPL-SCNC: 3.7 MMOL/L
PROT SERPL-MCNC: 6.7 G/DL
PT BLD: 10.3 SEC
RBC # BLD: 5.03 M/UL
RBC # FLD: 13.5 %
SODIUM SERPL-SCNC: 140 MMOL/L
WBC # FLD AUTO: 5.02 K/UL

## 2025-04-29 ENCOUNTER — APPOINTMENT (OUTPATIENT)
Dept: NEUROSURGERY | Facility: CLINIC | Age: 60
End: 2025-04-29
Payer: SUBSIDIZED

## 2025-04-29 DIAGNOSIS — Z00.6 ENCOUNTER FOR EXAMINATION FOR NORMAL COMPARISON AND CONTROL IN CLINICAL RESEARCH PROGRAM: ICD-10-CM

## 2025-04-29 DIAGNOSIS — C71.9 MALIGNANT NEOPLASM OF BRAIN, UNSPECIFIED: ICD-10-CM

## 2025-04-29 PROCEDURE — 99215 OFFICE O/P EST HI 40 MIN: CPT

## 2025-05-28 ENCOUNTER — APPOINTMENT (OUTPATIENT)
Dept: CARDIOLOGY | Facility: CLINIC | Age: 60
End: 2025-05-28

## 2025-06-03 ENCOUNTER — APPOINTMENT (OUTPATIENT)
Dept: NEUROSURGERY | Facility: CLINIC | Age: 60
End: 2025-06-03
Payer: SUBSIDIZED

## 2025-06-03 DIAGNOSIS — C71.9 MALIGNANT NEOPLASM OF BRAIN, UNSPECIFIED: ICD-10-CM

## 2025-06-03 DIAGNOSIS — Z00.6 ENCOUNTER FOR EXAMINATION FOR NORMAL COMPARISON AND CONTROL IN CLINICAL RESEARCH PROGRAM: ICD-10-CM

## 2025-06-03 PROCEDURE — 99215 OFFICE O/P EST HI 40 MIN: CPT

## 2025-06-11 ENCOUNTER — TRANSCRIPTION ENCOUNTER (OUTPATIENT)
Age: 60
End: 2025-06-11

## 2025-06-11 ENCOUNTER — INPATIENT (INPATIENT)
Facility: HOSPITAL | Age: 60
LOS: 0 days | Discharge: HOME | DRG: 446 | End: 2025-06-12
Attending: STUDENT IN AN ORGANIZED HEALTH CARE EDUCATION/TRAINING PROGRAM | Admitting: STUDENT IN AN ORGANIZED HEALTH CARE EDUCATION/TRAINING PROGRAM
Payer: COMMERCIAL

## 2025-06-11 ENCOUNTER — RESULT REVIEW (OUTPATIENT)
Age: 60
End: 2025-06-11

## 2025-06-11 VITALS
SYSTOLIC BLOOD PRESSURE: 156 MMHG | HEIGHT: 70 IN | TEMPERATURE: 98 F | DIASTOLIC BLOOD PRESSURE: 99 MMHG | HEART RATE: 78 BPM | WEIGHT: 231.49 LBS | RESPIRATION RATE: 16 BRPM

## 2025-06-11 DIAGNOSIS — K80.20 CALCULUS OF GALLBLADDER WITHOUT CHOLECYSTITIS WITHOUT OBSTRUCTION: ICD-10-CM

## 2025-06-11 DIAGNOSIS — Z98.890 OTHER SPECIFIED POSTPROCEDURAL STATES: Chronic | ICD-10-CM

## 2025-06-11 LAB
ALBUMIN SERPL ELPH-MCNC: 4.3 G/DL — SIGNIFICANT CHANGE UP (ref 3.3–5.2)
ALP SERPL-CCNC: 119 U/L — SIGNIFICANT CHANGE UP (ref 40–120)
ALT FLD-CCNC: 44 U/L — HIGH
ANION GAP SERPL CALC-SCNC: 19 MMOL/L — HIGH (ref 5–17)
APTT BLD: 31.5 SEC — SIGNIFICANT CHANGE UP (ref 26.1–36.8)
AST SERPL-CCNC: 41 U/L — HIGH
BASOPHILS # BLD AUTO: 0.05 K/UL — SIGNIFICANT CHANGE UP (ref 0–0.2)
BASOPHILS NFR BLD AUTO: 0.4 % — SIGNIFICANT CHANGE UP (ref 0–2)
BILIRUB SERPL-MCNC: 0.4 MG/DL — SIGNIFICANT CHANGE UP (ref 0.4–2)
BLD GP AB SCN SERPL QL: SIGNIFICANT CHANGE UP
BUN SERPL-MCNC: 11.3 MG/DL — SIGNIFICANT CHANGE UP (ref 8–20)
CALCIUM SERPL-MCNC: 9.6 MG/DL — SIGNIFICANT CHANGE UP (ref 8.4–10.5)
CHLORIDE SERPL-SCNC: 96 MMOL/L — SIGNIFICANT CHANGE UP (ref 96–108)
CO2 SERPL-SCNC: 21 MMOL/L — LOW (ref 22–29)
CREAT SERPL-MCNC: 0.69 MG/DL — SIGNIFICANT CHANGE UP (ref 0.5–1.3)
EGFR: 105 ML/MIN/1.73M2 — SIGNIFICANT CHANGE UP
EGFR: 105 ML/MIN/1.73M2 — SIGNIFICANT CHANGE UP
EOSINOPHIL # BLD AUTO: 0.04 K/UL — SIGNIFICANT CHANGE UP (ref 0–0.5)
EOSINOPHIL NFR BLD AUTO: 0.3 % — SIGNIFICANT CHANGE UP (ref 0–6)
GLUCOSE SERPL-MCNC: 107 MG/DL — HIGH (ref 70–99)
HCT VFR BLD CALC: 48.5 % — SIGNIFICANT CHANGE UP (ref 39–50)
HGB BLD-MCNC: 17.2 G/DL — HIGH (ref 13–17)
IMM GRANULOCYTES # BLD AUTO: 0.03 K/UL — SIGNIFICANT CHANGE UP (ref 0–0.07)
IMM GRANULOCYTES NFR BLD AUTO: 0.3 % — SIGNIFICANT CHANGE UP (ref 0–0.9)
INR BLD: 0.97 RATIO — SIGNIFICANT CHANGE UP (ref 0.85–1.16)
LACTATE BLDV-MCNC: 2.4 MMOL/L — HIGH (ref 0.5–2)
LIDOCAIN IGE QN: 24 U/L — SIGNIFICANT CHANGE UP (ref 22–51)
LYMPHOCYTES # BLD AUTO: 1.02 K/UL — SIGNIFICANT CHANGE UP (ref 1–3.3)
LYMPHOCYTES NFR BLD AUTO: 8.8 % — LOW (ref 13–44)
MCHC RBC-ENTMCNC: 29.9 PG — SIGNIFICANT CHANGE UP (ref 27–34)
MCHC RBC-ENTMCNC: 35.5 G/DL — SIGNIFICANT CHANGE UP (ref 32–36)
MCV RBC AUTO: 84.2 FL — SIGNIFICANT CHANGE UP (ref 80–100)
MONOCYTES # BLD AUTO: 1.01 K/UL — HIGH (ref 0–0.9)
MONOCYTES NFR BLD AUTO: 8.7 % — SIGNIFICANT CHANGE UP (ref 2–14)
NEUTROPHILS # BLD AUTO: 9.41 K/UL — HIGH (ref 1.8–7.4)
NEUTROPHILS NFR BLD AUTO: 81.5 % — HIGH (ref 43–77)
NRBC # BLD AUTO: 0 K/UL — SIGNIFICANT CHANGE UP (ref 0–0)
NRBC # FLD: 0 K/UL — SIGNIFICANT CHANGE UP (ref 0–0)
NRBC BLD AUTO-RTO: 0 /100 WBCS — SIGNIFICANT CHANGE UP (ref 0–0)
PLATELET # BLD AUTO: 320 K/UL — SIGNIFICANT CHANGE UP (ref 150–400)
PMV BLD: 9.4 FL — SIGNIFICANT CHANGE UP (ref 7–13)
POTASSIUM SERPL-MCNC: 4.9 MMOL/L — SIGNIFICANT CHANGE UP (ref 3.5–5.3)
POTASSIUM SERPL-SCNC: 4.9 MMOL/L — SIGNIFICANT CHANGE UP (ref 3.5–5.3)
PROT SERPL-MCNC: 7.7 G/DL — SIGNIFICANT CHANGE UP (ref 6.6–8.7)
PROTHROM AB SERPL-ACNC: 11.2 SEC — SIGNIFICANT CHANGE UP (ref 9.9–13.4)
RBC # BLD: 5.76 M/UL — SIGNIFICANT CHANGE UP (ref 4.2–5.8)
RBC # FLD: 12.9 % — SIGNIFICANT CHANGE UP (ref 10.3–14.5)
SODIUM SERPL-SCNC: 136 MMOL/L — SIGNIFICANT CHANGE UP (ref 135–145)
WBC # BLD: 11.56 K/UL — HIGH (ref 3.8–10.5)
WBC # FLD AUTO: 11.56 K/UL — HIGH (ref 3.8–10.5)

## 2025-06-11 PROCEDURE — 93010 ELECTROCARDIOGRAM REPORT: CPT

## 2025-06-11 PROCEDURE — 99285 EMERGENCY DEPT VISIT HI MDM: CPT

## 2025-06-11 PROCEDURE — 99222 1ST HOSP IP/OBS MODERATE 55: CPT | Mod: 57

## 2025-06-11 PROCEDURE — 76705 ECHO EXAM OF ABDOMEN: CPT | Mod: 26

## 2025-06-11 PROCEDURE — S2900 ROBOTIC SURGICAL SYSTEM: CPT | Mod: NC

## 2025-06-11 PROCEDURE — 88304 TISSUE EXAM BY PATHOLOGIST: CPT | Mod: 26

## 2025-06-11 PROCEDURE — 47562 LAPAROSCOPIC CHOLECYSTECTOMY: CPT

## 2025-06-11 DEVICE — SURGICEL POWDER 3 GRAMS: Type: IMPLANTABLE DEVICE | Status: FUNCTIONAL

## 2025-06-11 DEVICE — LIGATING CLIPS WECK HEMOLOK POLYMER LARGE (PURPLE) 6: Type: IMPLANTABLE DEVICE | Status: FUNCTIONAL

## 2025-06-11 DEVICE — IMPLANTABLE DEVICE: Type: IMPLANTABLE DEVICE | Status: FUNCTIONAL

## 2025-06-11 RX ORDER — OXYCODONE HYDROCHLORIDE 30 MG/1
5 TABLET ORAL ONCE
Refills: 0 | Status: DISCONTINUED | OUTPATIENT
Start: 2025-06-11 | End: 2025-06-11

## 2025-06-11 RX ORDER — ESCITALOPRAM OXALATE 20 MG/1
10 TABLET ORAL DAILY
Refills: 0 | Status: DISCONTINUED | OUTPATIENT
Start: 2025-06-11 | End: 2025-06-11

## 2025-06-11 RX ORDER — ENOXAPARIN SODIUM 100 MG/ML
40 INJECTION SUBCUTANEOUS EVERY 24 HOURS
Refills: 0 | Status: DISCONTINUED | OUTPATIENT
Start: 2025-06-11 | End: 2025-06-11

## 2025-06-11 RX ORDER — SODIUM CHLORIDE 9 G/1000ML
1000 INJECTION, SOLUTION INTRAVENOUS ONCE
Refills: 0 | Status: DISCONTINUED | OUTPATIENT
Start: 2025-06-11 | End: 2025-06-11

## 2025-06-11 RX ORDER — PIPERACILLIN-TAZO-DEXTROSE,ISO 3.375G/5
3.38 IV SOLUTION, PIGGYBACK PREMIX FROZEN(ML) INTRAVENOUS ONCE
Refills: 0 | Status: COMPLETED | OUTPATIENT
Start: 2025-06-11 | End: 2025-06-11

## 2025-06-11 RX ORDER — ROSUVASTATIN CALCIUM 20 MG/1
10 TABLET, FILM COATED ORAL AT BEDTIME
Refills: 0 | Status: DISCONTINUED | OUTPATIENT
Start: 2025-06-11 | End: 2025-06-11

## 2025-06-11 RX ORDER — FENTANYL CITRATE-0.9 % NACL/PF 100MCG/2ML
50 SYRINGE (ML) INTRAVENOUS
Refills: 0 | Status: DISCONTINUED | OUTPATIENT
Start: 2025-06-11 | End: 2025-06-11

## 2025-06-11 RX ORDER — ONDANSETRON HCL/PF 4 MG/2 ML
4 VIAL (ML) INJECTION EVERY 6 HOURS
Refills: 0 | Status: DISCONTINUED | OUTPATIENT
Start: 2025-06-11 | End: 2025-06-11

## 2025-06-11 RX ORDER — ROSUVASTATIN CALCIUM 20 MG/1
20 TABLET, FILM COATED ORAL AT BEDTIME
Refills: 0 | Status: DISCONTINUED | OUTPATIENT
Start: 2025-06-11 | End: 2025-06-12

## 2025-06-11 RX ORDER — ACETAMINOPHEN 500 MG/5ML
1000 LIQUID (ML) ORAL ONCE
Refills: 0 | Status: COMPLETED | OUTPATIENT
Start: 2025-06-11 | End: 2025-06-11

## 2025-06-11 RX ORDER — ACETAMINOPHEN 500 MG/5ML
975 LIQUID (ML) ORAL EVERY 6 HOURS
Refills: 0 | Status: DISCONTINUED | OUTPATIENT
Start: 2025-06-11 | End: 2025-06-12

## 2025-06-11 RX ORDER — LEVETIRACETAM 10 MG/ML
1000 INJECTION, SOLUTION INTRAVENOUS
Refills: 0 | Status: DISCONTINUED | OUTPATIENT
Start: 2025-06-11 | End: 2025-06-12

## 2025-06-11 RX ORDER — PIPERACILLIN-TAZO-DEXTROSE,ISO 3.375G/5
3.38 IV SOLUTION, PIGGYBACK PREMIX FROZEN(ML) INTRAVENOUS ONCE
Refills: 0 | Status: DISCONTINUED | OUTPATIENT
Start: 2025-06-11 | End: 2025-06-11

## 2025-06-11 RX ORDER — IBUPROFEN 200 MG
600 TABLET ORAL EVERY 6 HOURS
Refills: 0 | Status: DISCONTINUED | OUTPATIENT
Start: 2025-06-11 | End: 2025-06-11

## 2025-06-11 RX ORDER — ESCITALOPRAM OXALATE 20 MG/1
10 TABLET ORAL DAILY
Refills: 0 | Status: DISCONTINUED | OUTPATIENT
Start: 2025-06-11 | End: 2025-06-12

## 2025-06-11 RX ORDER — SODIUM CHLORIDE 9 G/1000ML
1000 INJECTION, SOLUTION INTRAVENOUS
Refills: 0 | Status: DISCONTINUED | OUTPATIENT
Start: 2025-06-11 | End: 2025-06-11

## 2025-06-11 RX ORDER — OXYCODONE HYDROCHLORIDE 30 MG/1
5 TABLET ORAL EVERY 6 HOURS
Refills: 0 | Status: DISCONTINUED | OUTPATIENT
Start: 2025-06-11 | End: 2025-06-11

## 2025-06-11 RX ORDER — AMOXICILLIN AND CLAVULANATE POTASSIUM 500; 125 MG/1; MG/1
1 TABLET, FILM COATED ORAL
Qty: 8 | Refills: 0
Start: 2025-06-11 | End: 2025-06-14

## 2025-06-11 RX ORDER — AMLODIPINE BESYLATE 10 MG/1
5 TABLET ORAL DAILY
Refills: 0 | Status: DISCONTINUED | OUTPATIENT
Start: 2025-06-11 | End: 2025-06-12

## 2025-06-11 RX ORDER — INDOCYANINE GREEN AND WATER 25 MG
2.5 KIT INJECTION ONCE
Refills: 0 | Status: COMPLETED | OUTPATIENT
Start: 2025-06-11 | End: 2025-06-11

## 2025-06-11 RX ORDER — ACETAMINOPHEN 500 MG/5ML
975 LIQUID (ML) ORAL EVERY 6 HOURS
Refills: 0 | Status: DISCONTINUED | OUTPATIENT
Start: 2025-06-11 | End: 2025-06-11

## 2025-06-11 RX ORDER — ENOXAPARIN SODIUM 100 MG/ML
40 INJECTION SUBCUTANEOUS EVERY 24 HOURS
Refills: 0 | Status: DISCONTINUED | OUTPATIENT
Start: 2025-06-11 | End: 2025-06-12

## 2025-06-11 RX ORDER — AMLODIPINE BESYLATE 10 MG/1
1 TABLET ORAL
Refills: 0 | DISCHARGE

## 2025-06-11 RX ORDER — ONDANSETRON HCL/PF 4 MG/2 ML
4 VIAL (ML) INJECTION ONCE
Refills: 0 | Status: COMPLETED | OUTPATIENT
Start: 2025-06-11 | End: 2025-06-11

## 2025-06-11 RX ORDER — LEVETIRACETAM 10 MG/ML
250 INJECTION, SOLUTION INTRAVENOUS
Refills: 0 | Status: DISCONTINUED | OUTPATIENT
Start: 2025-06-11 | End: 2025-06-11

## 2025-06-11 RX ORDER — HYDROCHLOROTHIAZIDE 50 MG/1
0 TABLET ORAL
Refills: 0 | DISCHARGE

## 2025-06-11 RX ORDER — IBUPROFEN 200 MG
600 TABLET ORAL ONCE
Refills: 0 | Status: COMPLETED | OUTPATIENT
Start: 2025-06-11 | End: 2025-06-11

## 2025-06-11 RX ORDER — MELATONIN 5 MG
5 TABLET ORAL AT BEDTIME
Refills: 0 | Status: DISCONTINUED | OUTPATIENT
Start: 2025-06-11 | End: 2025-06-12

## 2025-06-11 RX ADMIN — Medication 4 MILLIGRAM(S): at 11:15

## 2025-06-11 RX ADMIN — Medication 4 MILLIGRAM(S): at 10:17

## 2025-06-11 RX ADMIN — ROSUVASTATIN CALCIUM 20 MILLIGRAM(S): 20 TABLET, FILM COATED ORAL at 23:36

## 2025-06-11 RX ADMIN — Medication 975 MILLIGRAM(S): at 23:57

## 2025-06-11 RX ADMIN — Medication 20 MILLIGRAM(S): at 10:16

## 2025-06-11 RX ADMIN — Medication 400 MILLIGRAM(S): at 19:20

## 2025-06-11 RX ADMIN — INDOCYANINE GREEN AND WATER 2.5 MILLIGRAM(S): KIT at 15:46

## 2025-06-11 RX ADMIN — ENOXAPARIN SODIUM 40 MILLIGRAM(S): 100 INJECTION SUBCUTANEOUS at 23:57

## 2025-06-11 RX ADMIN — Medication 400 MILLIGRAM(S): at 10:45

## 2025-06-11 RX ADMIN — Medication 200 GRAM(S): at 14:44

## 2025-06-11 RX ADMIN — Medication 1000 MILLIGRAM(S): at 11:45

## 2025-06-11 RX ADMIN — SODIUM CHLORIDE 75 MILLILITER(S): 9 INJECTION, SOLUTION INTRAVENOUS at 20:05

## 2025-06-11 RX ADMIN — OXYCODONE HYDROCHLORIDE 5 MILLIGRAM(S): 30 TABLET ORAL at 19:20

## 2025-06-11 RX ADMIN — Medication 4 MILLIGRAM(S): at 19:40

## 2025-06-11 RX ADMIN — Medication 600 MILLIGRAM(S): at 23:57

## 2025-06-11 RX ADMIN — Medication 1000 MILLILITER(S): at 10:17

## 2025-06-11 RX ADMIN — Medication 1000 MILLILITER(S): at 13:03

## 2025-06-11 RX ADMIN — LEVETIRACETAM 1000 MILLIGRAM(S): 10 INJECTION, SOLUTION INTRAVENOUS at 23:36

## 2025-06-11 RX ADMIN — Medication 50 MICROGRAM(S): at 19:05

## 2025-06-11 RX ADMIN — Medication 5 MILLIGRAM(S): at 23:57

## 2025-06-11 NOTE — ED PROVIDER NOTE - ATTENDING APP SHARED VISIT CONTRIBUTION OF CARE
Rosalio ATTG See MDM I performed a history and physical exam of the patient and discussed their management with the Physician assistant reviewed the PAs note and agree with the documented findings and plan of care. My medical decision making and observations are found above.     pt presenting w/ abd pain hx of gallbladder stones in neck, was previously offered surgery but this time couldn't handle pain, left last time 2/2 family emergency, pt found to have similar presentation plan for admission / surgery consult

## 2025-06-11 NOTE — ASU PREOP CHECKLIST - HAND OFF
On call note  Pt called the service on 8/3/18 to complain of a change in her vision where she saw a black squiggly line in both her eyes which lasted for about half hour to 45 minutes.  She had no associated facial weakness, dysarthria or dizziness.  She called her ophthalmologist and spoke to the physician assistant and was directed to call her family physician.  She is currently asymptomatic.  She was advised that if symptoms reoccur she should go to the ER for evaluation.  Also recommend she follow-up with the ophthalmologist as soon as possible she has an appointment with them Tuesday of next week.   Holding RN to OR RN

## 2025-06-11 NOTE — BRIEF OPERATIVE NOTE - OPERATION/FINDINGS
Robot assisted cholecystectomy , Surgicel power applied  Robot assisted cholecystectomy , hydrops GB, Surgicel power applied to gallbladder fossa, hemostasis achieved

## 2025-06-11 NOTE — ED PROVIDER NOTE - OBJECTIVE STATEMENT
60-year-old male with a history of glioma, gallstones, presents to the ED complaining of epigastric pain right upper quadrant pain after eating this morning patient. notes he was recently in [facility] for similar pain and was diagnosed with a gallstone.  Notes he was offered surgery but did not do surgery due to family emergency.  Patient notes he has been feeling well but this morning he ate something and started having epigastric pain and right upper quadrant pain again.  Pain 10 out of 10 at an intensity.  Associated nausea and vomiting.  Nonbloody nonbilious vomiting.  No fevers no chills.  No history of abdominal surgeries.  Passing gas.

## 2025-06-11 NOTE — ED ADULT TRIAGE NOTE - CHIEF COMPLAINT QUOTE
c/o mid abdominal pain after eating dinner last night. + nausea, subjective fevers, denies vomiting. Hx of blocked bile duct, unable to have cholecystectomy due to family emergency.

## 2025-06-11 NOTE — ASU DISCHARGE PLAN (ADULT/PEDIATRIC) - FINANCIAL ASSISTANCE
[facility] provides services at a reduced cost to those who are determined to be eligible through [facility]’s financial assistance program. Information regarding [facility]’s financial assistance program can be found by going to [email or web address] or by calling [phone].

## 2025-06-11 NOTE — ED PROVIDER NOTE - PHYSICAL EXAMINATION
General-alert and oriented to person place and time, nontoxic appearing, pleasant cooperative, NAD  HEENT-normocephalic, atraumatic, NT to palp, EOMI, PERRLA, no conjunctival injections, nares patent, pinna nt to palp, tympanic membrane intact bilaterally, nonbulging TM, no erythema noted, +light reflex, moist oral mucosa, tongue nonenlarged, uvula midline, tonsils nonenlarged, no exudates or erythema noted  Neck- supple, trach midline, No JVD, no LAD  Chest- Nt to palp, no reproducible pain  Cardio-s1,s2 present, regular rate and rhythm  Resp- talks in full sentences, symmetrical chest rise, CTA bilat, no evidence of wheezes, rhonchi noted  Abdomen- bowel sounds presnt in all 4 quadrants, soft, tender to ruq, ND, no guarding, no rebound tenderness  MSK- moves all extremities, able to ambulate without issues  Back- nt to palp of cervical, thoracic, lumbar spine, nt to palp of paraspinal m., No CVA tenderness  Neuro- no focal deficits, sensation intact

## 2025-06-11 NOTE — ED PROVIDER NOTE - CLINICAL SUMMARY MEDICAL DECISION MAKING FREE TEXT BOX
60-year-old male with a history of glioma, gallstones, presents to the ED complaining of epigastric pain right upper quadrant pain after eating this morning patient. mild leukocytosis, elevated liver enzymes, us showing gallstones in the neck similar to appearance from us and ct from [facility] 1 month ago. Seen by surgery will admit

## 2025-06-11 NOTE — H&P ADULT - ATTENDING COMMENTS
59yo male with PMH/PSH of glioblastoma s/p craniotomy dec 2023 and feb 2024 was found acute cholecystitis, Robotic assisted cholecystectomy was indicated.     I have seen and examined this patient as well as reviewed the labs, imaging, and reports. I have discussed the planned procedure, along with the risks, benefits, indications, and alternatives with the patient. The patient understands and wishes to proceed.    Discussed the risks and benefits of operative intervention, including risks of bleeding, infection, damage to surrounding structures, and need for further intervention. Patient voiced understanding and desire to proceed with surgery.

## 2025-06-11 NOTE — ASU DISCHARGE PLAN (ADULT/PEDIATRIC) - C. MAKE IMPORTANT PERSONAL OR BUSINESS DECISIONS
07/19/24 1215   Discharge Planning   Home or Post Acute Services Post acute facilities (Rehab/SNF/etc)   Type of Post Acute Facility Services Skilled nursing   Expected Discharge Disposition SNF   Patient Choice   Patient / Family choosing to utilize agency / facility established prior to hospitalization Yes     Therapy recommending moderate intensity rehab. Updated by palliative team that patient prefers to go to [facility] SNF as she has been there in the past. Request sent to [facility] to send referral. Patient will need precert if accepted.     1530-  Spoke with patient's daughter Hanna over the phone who confirmed that [facility] is [facility]. Spoke with admissions at [facility] over the phone and they can accept. Request sent to the direct precert team to start precert.    Statement Selected

## 2025-06-11 NOTE — ED ADULT NURSE NOTE - OBJECTIVE STATEMENT
Pt presents to ED c/o abdominal pain and nausea since last night. PT has history of blocked biliary duct but was unable to go to outpatient appointment due to recent family emergency. Reports worsening pain.

## 2025-06-11 NOTE — ASU DISCHARGE PLAN (ADULT/PEDIATRIC) - CARE PROVIDER_API CALL
Kym Mcmullen  Surgery  [address]  [place] [address]  Phone: [phone]  Fax: [phone]  Follow Up Time: 2 weeks

## 2025-06-11 NOTE — H&P ADULT - REASON FOR ADMISSION
Please review the summary from your postoperative appointment following your procedure with Dr. Nayak.      Please follow up for your 4 weeks for your postoperative appointment with Dr. Nayak. This appointment should be scheduled when you check out today.         Please continue to follow your post-operative activity restrictions as advised by your surgeon. In regards to exercise, it is recommended to continue to focus on a daily walking routine.      Outpatient physical therapy will be discussed at your next follow up appointment. Physical Therapy typically consists 2-3x a week for 4-6 weeks.      Please continue to avoid NSAIDs (i.e. Ibuprofen, aleve, meloxicam, mobic) for 6 months postoperatively.     To request a refill of your prescription please call the office 2-3 days before your medication will run out. Our office hours are Monday-Friday 8:30-5:00pm.   All prescriptions will be filled within 48 hours. If a refill request is received on a Friday after 4:00 PM it will be completed by 12:00 PM the following Monday.     If Dr. Nayak has prescribed the Biomet Spinal Bruno-Bone Growth Stimulator, please continue to wear the device as prescribed for 6 months postoperatively     If there are any questions, concerns, or changes in symptoms please notify your RN, Gabrielle Mera, at [phone]     We would love to hear from you! The most valuable feedback comes from our patients.     You may receive an e-mail from [facility] asking for feedback on your visit to our office.     Please also visit the following websites to let us know about your experience with Dr. Casey Nayak.     [email or web address]  [email or web address]  [employer]  [email or web address]        symptomatic cholelithiasis

## 2025-06-11 NOTE — H&P ADULT - ASSESSMENT
- Admit to surgery service --       - Multi-modal pain control     - Anti-emetics PRN     - Bowel regimen***     - IV Abx:***     - Diet:***     - mIVF***     - Strict I&O's     - Encourage use of IS 10x/hour     - Out of bed     - Ambulate as tolerated     - PT Eval     - DVT Prophylaxis: SCDs and HSQ     - Home meds     - AM labs, replete lytes PRN  Patient is a 61 yo M with a pmh of glioblastoma, HTN, HLD, cholithiasis presenting to the ED with a cc of RUQ abdominal pain after fatty meal yesterday evening, Patient has symptomatic cholelithiasis. patient found to have stone in neck of gallbladder on RUQ US. WBC elevated to 11.6, AST/ALT mildly elevated to 41/44. lactate elevated to 2.6. on PE patient was tender to deep palpation in RUQ      Plan:  - Admit to surgery service -- Dr. Mcmullen    -will plan for RA cholecystectomy today  - Multi-modal pain control   - Anti-emetics PRN   - zosyn  - NPO  -will give 1L fluid bolus now   - mIVF   - Strict I&O's   - Encourage use of IS 10x/hour   - Out of bed   - Ambulate as tolerated   - PT Eval   - DVT Prophylaxis: SCDs and HSQ   - Home meds   - AM labs, replete lytes PRN

## 2025-06-11 NOTE — H&P ADULT - NSHPLABSRESULTS_GEN_ALL_CORE
< from: US Abdomen Upper Quadrant Right (06.11.25 @ 12:22) >    Small gallstones better appreciated on patient's prior CT scan.    < end of copied text >

## 2025-06-12 ENCOUNTER — TRANSCRIPTION ENCOUNTER (OUTPATIENT)
Age: 60
End: 2025-06-12

## 2025-06-12 VITALS
HEART RATE: 79 BPM | SYSTOLIC BLOOD PRESSURE: 135 MMHG | DIASTOLIC BLOOD PRESSURE: 88 MMHG | TEMPERATURE: 98 F | RESPIRATION RATE: 18 BRPM | OXYGEN SATURATION: 95 %

## 2025-06-12 PROCEDURE — 96374 THER/PROPH/DIAG INJ IV PUSH: CPT

## 2025-06-12 PROCEDURE — C1889: CPT

## 2025-06-12 PROCEDURE — 96375 TX/PRO/DX INJ NEW DRUG ADDON: CPT

## 2025-06-12 PROCEDURE — 86900 BLOOD TYPING SEROLOGIC ABO: CPT

## 2025-06-12 PROCEDURE — 36415 COLL VENOUS BLD VENIPUNCTURE: CPT

## 2025-06-12 PROCEDURE — 86901 BLOOD TYPING SEROLOGIC RH(D): CPT

## 2025-06-12 PROCEDURE — 83690 ASSAY OF LIPASE: CPT

## 2025-06-12 PROCEDURE — 99285 EMERGENCY DEPT VISIT HI MDM: CPT | Mod: 25

## 2025-06-12 PROCEDURE — 88304 TISSUE EXAM BY PATHOLOGIST: CPT

## 2025-06-12 PROCEDURE — 80053 COMPREHEN METABOLIC PANEL: CPT

## 2025-06-12 PROCEDURE — 86850 RBC ANTIBODY SCREEN: CPT

## 2025-06-12 PROCEDURE — S2900: CPT

## 2025-06-12 PROCEDURE — C9399: CPT

## 2025-06-12 PROCEDURE — C1887: CPT

## 2025-06-12 PROCEDURE — 85025 COMPLETE CBC W/AUTO DIFF WBC: CPT

## 2025-06-12 PROCEDURE — 85730 THROMBOPLASTIN TIME PARTIAL: CPT

## 2025-06-12 PROCEDURE — 76705 ECHO EXAM OF ABDOMEN: CPT

## 2025-06-12 PROCEDURE — 99024 POSTOP FOLLOW-UP VISIT: CPT

## 2025-06-12 PROCEDURE — 85610 PROTHROMBIN TIME: CPT

## 2025-06-12 PROCEDURE — 93005 ELECTROCARDIOGRAM TRACING: CPT

## 2025-06-12 PROCEDURE — 83605 ASSAY OF LACTIC ACID: CPT

## 2025-06-12 RX ORDER — ACETAMINOPHEN 500 MG/5ML
3 LIQUID (ML) ORAL
Qty: 0 | Refills: 0 | DISCHARGE
Start: 2025-06-12

## 2025-06-12 RX ADMIN — Medication 975 MILLIGRAM(S): at 00:57

## 2025-06-12 RX ADMIN — Medication 600 MILLIGRAM(S): at 00:57

## 2025-06-12 RX ADMIN — Medication 40 MILLIGRAM(S): at 05:43

## 2025-06-12 RX ADMIN — LEVETIRACETAM 1000 MILLIGRAM(S): 10 INJECTION, SOLUTION INTRAVENOUS at 11:52

## 2025-06-12 RX ADMIN — AMLODIPINE BESYLATE 5 MILLIGRAM(S): 10 TABLET ORAL at 05:43

## 2025-06-12 NOTE — CHART NOTE - NSCHARTNOTEFT_GEN_A_CORE
Post-op Check    Subjective:  Pt offers no acute complaints at this time. Pain well controlled on current regiment tylenol/ motrin. Denies chest pain, SOB, palpitations. Patient tolerating po, " feeling ok" Patient was going to be discharged but preferred to stay overnight and be discharged in the am     STATUS POST:  REYNA downing    POST OPERATIVE DAY #: 0    MEDICATIONS  (STANDING):  amLODIPine   Tablet 5 milliGRAM(s) Oral daily  enoxaparin Injectable 40 milliGRAM(s) SubCutaneous every 24 hours  escitalopram 10 milliGRAM(s) Oral daily  levETIRAcetam 1000 milliGRAM(s) Oral two times a day  pantoprazole    Tablet 40 milliGRAM(s) Oral before breakfast  rosuvastatin 20 milliGRAM(s) Oral at bedtime    MEDICATIONS  (PRN):  acetaminophen     Tablet .. 975 milliGRAM(s) Oral every 6 hours PRN Mild Pain (1 - 3)  melatonin 5 milliGRAM(s) Oral at bedtime PRN Sleep      Vital Signs Last 24 Hrs  T(C): 36.7 (11 Jun 2025 21:00), Max: 36.9 (11 Jun 2025 08:41)  T(F): 98 (11 Jun 2025 21:00), Max: 98.5 (11 Jun 2025 08:41)  HR: 86 (11 Jun 2025 21:00) (78 - 86)  BP: 144/97 (11 Jun 2025 21:00) (117/79 - 160/97)  BP(mean): 110 (11 Jun 2025 20:30) (99 - 117)  RR: 18 (11 Jun 2025 21:00) (14 - 18)  SpO2: 92% (11 Jun 2025 21:00) (92% - 98%)    Parameters below as of 11 Jun 2025 21:00  Patient On (Oxygen Delivery Method): room air        Physical Exam:  Constitutional: in good spirits, appears clinically well   Respiratory: no accessory muscle use, respirations non-labored  GI: abdomen softly distended, no guarding, no peritonitis   Neurological: A&O x 3; without gross deficit    A:     P:  regular diet  Pain control  OOB as tolerated  Encourage IS  DVT ppx  dc home this am

## 2025-06-12 NOTE — DISCHARGE NOTE PROVIDER - NSDCCPCAREPLAN_GEN_ALL_CORE_FT
PRINCIPAL DISCHARGE DIAGNOSIS  Diagnosis: Cholelithiasis  Assessment and Plan of Treatment: - FOLLOW UP: Please call and make an appointment with the Acute Care Surgery Clinic 10-14 days after discharge. Also, please call and make an appointment with your primary care physician as per your usual schedule.   - ACTIVITY: May return to normal activities as tolerated, however refrain from heavy lifting > 10-15 lbs.  - DIET: May continue regular diet.  - MEDICATIONS: Please resume home medications as previously prescribed. You can take tylenol for pain relief, as needed.  - WOUND CARE: Please, keep wound site clean and dry. You may shower, but do not bathe, swim, or submerge wounds in water for extended period of time.   Patient is advised to RETURN TO THE EMERGENCY DEPARTMENT for any of the following - worsening pain, fever/chills, nausea/vomiting, altered mental status, chest pain, shortness of breath, or any other new / worsening symptom.     PRINCIPAL DISCHARGE DIAGNOSIS  Diagnosis: Cholelithiasis  Assessment and Plan of Treatment: - FOLLOW UP: Please call and make an appointment with the Acute Care Surgery Clinic 10-14 days after discharge. Also, please call and make an appointment with your primary care physician as per your usual schedule.   - ACTIVITY: May return to normal activities as tolerated, however refrain from heavy lifting > 10-15 lbs.  - DIET: May continue regular diet.  - MEDICATIONS: Please resume home medications as previously prescribed. You can take tylenol for pain relief, as needed. Continue antibiotics as prescribed.   - WOUND CARE: Please, keep wound site clean and dry. You may shower, but do not bathe, swim, or submerge wounds in water for extended period of time.   Patient is advised to RETURN TO THE EMERGENCY DEPARTMENT for any of the following - worsening pain, fever/chills, nausea/vomiting, altered mental status, chest pain, shortness of breath, or any other new / worsening symptom.

## 2025-06-12 NOTE — PATIENT PROFILE ADULT - FALL HARM RISK - HARM RISK INTERVENTIONS

## 2025-06-12 NOTE — DISCHARGE NOTE PROVIDER - NSFOLLOWUPCLINICS_GEN_ALL_ED_FT
[facility] Acute Care Surgery  Acute Care Surgery  [address] [place] [id number]  Phone: [phone]  Fax:

## 2025-06-12 NOTE — DISCHARGE NOTE PROVIDER - NSDCMRMEDTOKEN_GEN_ALL_CORE_FT
acetaminophen 325 mg oral tablet: 3 tab(s) orally every 6 hours As needed Mild Pain (1 - 3)  amLODIPine 5 mg oral tablet: 1 tab(s) orally once a day  amoxicillin-clavulanate 875 mg-125 mg oral tablet: 1 tab(s) orally 2 times a day  Crestor 20 mg oral tablet: 1 tab(s) orally once a day  Keppra 1000 mg oral tablet: 1 tab(s) orally every 12 hours  Lexapro 10 mg oral tablet: 1 tab(s) orally once a day  Protonix 40 mg oral delayed release tablet: 1 tab(s) orally once a day  senna leaf extract oral tablet: 2 tab(s) orally once a day (at bedtime) as needed for  constipation  Zofran ODT 8 mg oral tablet, disintegratin tab(s) orally prn

## 2025-06-12 NOTE — DISCHARGE NOTE PROVIDER - NSDCFUSCHEDAPPT_GEN_ALL_CORE_FT
[facility]  MRI  [facility]  Scheduled Appointment: 06/24/2025    Anatoly Calix  [place] [facility]  NEUROLOGY [address] [facility]  Scheduled Appointment: 06/24/2025

## 2025-06-12 NOTE — DISCHARGE NOTE NURSING/CASE MANAGEMENT/SOCIAL WORK - PATIENT PORTAL LINK FT
You can access the FollowMyHealth Patient Portal offered by [facility] by registering at the following website: [email or web address]. By joining [facility]’s FollowMyHealth portal, you will also be able to view your health information using other applications (apps) compatible with our system.

## 2025-06-12 NOTE — PATIENT PROFILE ADULT - HOW MUCH WEIGHT HAVE YOU LOST?
[address]  [address] [facility], [address] [place] [address]      Important Phone Numbers:  Call [phone] broth or bouillon  Chicken, vegetable, beef broth    Fruit Juice  Apple, white grape, white cranberry    Sports drinks  Powerade, Gatorade, vitamin water    Coffee or tea  No dairy! Sugar/sweeteners are ok    Jell-O  No added fruit pieces, no red/purple    Soda/carbonated beverages  No red/purple, dark sodas ok. Popsicles  No pulp, no red/purple    Gummy bears  No red/purple      *If you are not diabetic skip to the following page for frequently asked questions. Diabetic Special Instructions:    Please have a combination of low sugar and regular (with sugar) approved clear liquids. It is important to monitor your blood sugar:  With every meal, even clear liquid  At bedtime  The morning of procedure   Know your signs and symptoms of low or high blood sugar:  Signs of high blood sugar: blurry vision, sleepiness dizziness, increased thirst, frequent urination, nausea/vomiting, fruity breath. Signs of low blood sugar: headache, sweating, blurred vision, dizziness, nervous, feeling weak/tired, irritable, shaking, hungry, elevated heart rate. Aim for 45-grams of carbohydrates at meals, 15-30 grams for snacks. See Table 4 for examples. Liquids without carbohydrates: Fat-free broth, bouillon, consomme', clear diet soda, coffee, diet/unsweetened tea, seltzer, flavored water. If you are unsure about how to adjust dosing, please call your diabetic medicaton prescribing physician so they can advise you. Table 4:  Diabetic Carbohydrate Examples    Liquid  Ounces  Carbohydrate value    Apple Juice  4 Ounces  15 grams    White Grape Juice  4 Ounces  15 grams    Sports drink (Gatorade)  8 Ounces  14 grams    Gelatin (Jell-O)  Â½ Cup  15 grams    Orange popsicles/Ice pops  2  15 grams    Leonard Ice (not Andrew Cameron)  Per label  30 grams           Frequent Questions:    What is a clear stool? A clear stool can have a slight tint of yellow or brown.   It will be completely transparent, and will not contain any solid 2-13 lbs (1)

## 2025-06-12 NOTE — DISCHARGE NOTE NURSING/CASE MANAGEMENT/SOCIAL WORK - FINANCIAL ASSISTANCE
[facility] provides services at a reduced cost to those who are determined to be eligible through [facility]’s financial assistance program. Information regarding [facility]’s financial assistance program can be found by going to [email or web address] or by calling [phone].

## 2025-06-12 NOTE — PROGRESS NOTE ADULT - NS ATTEND AMEND GEN_ALL_CORE FT
Patient seen and examined at bedside. No acute events overnight. Denies any nausea or vomiting. Pain is well controlled. Afebrile. Tolerating diet. Abdomen is soft, non distended, non tender to palpation, incision sites are C/D/I    Continue diet  Discharge with 4 days of Augmentin  Surgically stable for discharge  Discharge today  F/U as outpatient

## 2025-06-12 NOTE — DISCHARGE NOTE NURSING/CASE MANAGEMENT/SOCIAL WORK - NSDCFUADDAPPT_GEN_ALL_CORE_FT
APPTS ARE READY TO BE MADE: [X] YES    Additional Information about above appointments (if needed):    1: Acute Care Surgery Clinic    Other comments or requests:

## 2025-06-12 NOTE — DISCHARGE NOTE PROVIDER - HOSPITAL COURSE
Admission HPI:  Pt is a 61yo M with pmh of glioblastoma s/p craniotomy dec 2023 and feb 2024, mild CAD, HLD, HTN presenting to the ED with a cc of RUQ abd pain, Of note patient recently presented to [facility] with same. During visit patient was found to have stone in neck of gallbladder, was told to follow up with surgeon outpatient for elective cholecystectomy, patient was scheduled for cholecystectomy at [facility] for June 13th however cancelled due to family emergency. Last night patient said he had steak and shortly after developed RUQ abd pain, subjective fever nausea and one episode of vomiting. the pain persisted into this AM which prompted him to come to ED. Patient had RUQ ultrasound done which showed stone in neck of gallbladder, when seen in ED patient denied any symptoms at this time. on physical exam patient was tender to deep palpation in RUQ. patient denies any sob/cp or n/v at this time. rest of ros-    (11 Jun 2025 13:59)    Hospital Course:  Patient was admitted to the acute care surgery service. Taken to the OR on 6/11 for robotic assisted cholecystectomy. Pt tolerated procedure well. Post-op day one pt is tolerating diet, pain controlled, OOB ambulating and passing gas. Stable for discharge home with outpatient follow-up as outlined below.    Patient is advised to RETURN TO THE EMERGENCY DEPARTMENT for any of the following - worsening pain, fever/chills, nausea/vomiting, altered mental status, chest pain, shortness of breath, or any other new / worsening symptom.

## 2025-06-12 NOTE — PROGRESS NOTE ADULT - ASSESSMENT
Pt is a 59 y/o male admitted for mgmt of acute cholecystitis. Post-op day 1 s/p robotic assisted cholecystectomy. Tolerating diet, pain mild & well controlled.  - Continue regular diet  - Tylenol for pain relief, as needed  - Encourage ambulation  - Resume home medications as prescribed  - DVT ppx w/ lovenox & SCDs while admitted  - Encourage incentive spirometer use  - Pt is stable for discharge home today

## 2025-06-12 NOTE — DISCHARGE NOTE PROVIDER - NSDCFUADDAPPT_GEN_ALL_CORE_FT
APPTS ARE READY TO BE MADE: [X] YES    Additional Information about above appointments (if needed):    1: Acute Care Surgery Clinic    Other comments or requests:  APPTS ARE READY TO BE MADE: [X] YES    Additional Information about above appointments (if needed):    1: Acute Care Surgery Clinic    Other comments or requests:     Provided patient with provider referral information, however patient prefers to schedule the appointments on their own.

## 2025-06-19 LAB — SURGICAL PATHOLOGY STUDY: SIGNIFICANT CHANGE UP

## 2025-06-24 ENCOUNTER — APPOINTMENT (OUTPATIENT)
Dept: MRI IMAGING | Facility: IMAGING CENTER | Age: 60
End: 2025-06-24

## 2025-06-24 ENCOUNTER — APPOINTMENT (OUTPATIENT)
Dept: NEUROLOGY | Facility: CLINIC | Age: 60
End: 2025-06-24
Payer: COMMERCIAL

## 2025-06-24 ENCOUNTER — OUTPATIENT (OUTPATIENT)
Dept: OUTPATIENT SERVICES | Facility: HOSPITAL | Age: 60
LOS: 1 days | End: 2025-06-24
Payer: SELF-PAY

## 2025-06-24 VITALS
RESPIRATION RATE: 18 BRPM | SYSTOLIC BLOOD PRESSURE: 133 MMHG | DIASTOLIC BLOOD PRESSURE: 94 MMHG | OXYGEN SATURATION: 98 % | TEMPERATURE: 98.3 F | HEIGHT: 70 IN | BODY MASS INDEX: 30.78 KG/M2 | WEIGHT: 215 LBS | HEART RATE: 83 BPM

## 2025-06-24 DIAGNOSIS — C71.9 MALIGNANT NEOPLASM OF BRAIN, UNSPECIFIED: ICD-10-CM

## 2025-06-24 DIAGNOSIS — Z00.8 ENCOUNTER FOR OTHER GENERAL EXAMINATION: ICD-10-CM

## 2025-06-24 DIAGNOSIS — Z98.890 OTHER SPECIFIED POSTPROCEDURAL STATES: Chronic | ICD-10-CM

## 2025-06-24 PROCEDURE — 99214 OFFICE O/P EST MOD 30 MIN: CPT

## 2025-06-24 PROCEDURE — 70553 MRI BRAIN STEM W/O & W/DYE: CPT | Mod: 26

## 2025-06-24 PROCEDURE — A9585: CPT

## 2025-06-24 PROCEDURE — 70553 MRI BRAIN STEM W/O & W/DYE: CPT

## 2025-07-01 ENCOUNTER — APPOINTMENT (OUTPATIENT)
Dept: NEUROSURGERY | Facility: CLINIC | Age: 60
End: 2025-07-01
Payer: COMMERCIAL

## 2025-07-01 PROCEDURE — 99215 OFFICE O/P EST HI 40 MIN: CPT

## 2025-07-29 ENCOUNTER — APPOINTMENT (OUTPATIENT)
Dept: NEUROSURGERY | Facility: CLINIC | Age: 60
End: 2025-07-29
Payer: SUBSIDIZED

## 2025-07-29 DIAGNOSIS — Z00.6 ENCOUNTER FOR EXAMINATION FOR NORMAL COMPARISON AND CONTROL IN CLINICAL RESEARCH PROGRAM: ICD-10-CM

## 2025-07-29 DIAGNOSIS — C71.9 MALIGNANT NEOPLASM OF BRAIN, UNSPECIFIED: ICD-10-CM

## 2025-07-29 PROCEDURE — 99215 OFFICE O/P EST HI 40 MIN: CPT

## 2025-07-29 NOTE — ED ADULT NURSE NOTE - CAS EDP DISCH TYPE
What Is The Reason For Today's Visit?: Full Body Skin Examination with No Concerns
What Is The Reason For Today's Visit? (Being Monitored For X): concerning skin lesions on an annual basis
Home

## 2025-08-27 ENCOUNTER — APPOINTMENT (OUTPATIENT)
Dept: MRI IMAGING | Facility: IMAGING CENTER | Age: 60
End: 2025-08-27

## 2025-08-27 ENCOUNTER — APPOINTMENT (OUTPATIENT)
Dept: NEUROLOGY | Facility: CLINIC | Age: 60
End: 2025-08-27
Payer: COMMERCIAL

## 2025-08-27 ENCOUNTER — OUTPATIENT (OUTPATIENT)
Dept: OUTPATIENT SERVICES | Facility: HOSPITAL | Age: 60
LOS: 1 days | End: 2025-08-27
Payer: COMMERCIAL

## 2025-08-27 VITALS
SYSTOLIC BLOOD PRESSURE: 130 MMHG | BODY MASS INDEX: 30.64 KG/M2 | WEIGHT: 214 LBS | DIASTOLIC BLOOD PRESSURE: 97 MMHG | RESPIRATION RATE: 16 BRPM | HEIGHT: 70 IN | HEART RATE: 85 BPM | OXYGEN SATURATION: 99 %

## 2025-08-27 DIAGNOSIS — C71.9 MALIGNANT NEOPLASM OF BRAIN, UNSPECIFIED: ICD-10-CM

## 2025-08-27 DIAGNOSIS — Z98.890 OTHER SPECIFIED POSTPROCEDURAL STATES: Chronic | ICD-10-CM

## 2025-08-27 DIAGNOSIS — Z00.8 ENCOUNTER FOR OTHER GENERAL EXAMINATION: ICD-10-CM

## 2025-08-27 PROCEDURE — A9585: CPT

## 2025-08-27 PROCEDURE — 70553 MRI BRAIN STEM W/O & W/DYE: CPT

## 2025-08-27 PROCEDURE — 99214 OFFICE O/P EST MOD 30 MIN: CPT

## 2025-08-27 PROCEDURE — 70553 MRI BRAIN STEM W/O & W/DYE: CPT | Mod: 26

## 2025-09-02 ENCOUNTER — APPOINTMENT (OUTPATIENT)
Dept: NEUROSURGERY | Facility: CLINIC | Age: 60
End: 2025-09-02
Payer: COMMERCIAL

## 2025-09-02 DIAGNOSIS — C71.9 MALIGNANT NEOPLASM OF BRAIN, UNSPECIFIED: ICD-10-CM

## 2025-09-02 DIAGNOSIS — Z00.6 ENCOUNTER FOR EXAMINATION FOR NORMAL COMPARISON AND CONTROL IN CLINICAL RESEARCH PROGRAM: ICD-10-CM

## 2025-09-02 PROCEDURE — 99215 OFFICE O/P EST HI 40 MIN: CPT

## (undated) DEVICE — XI OBTURATOR OPTICAL BLADELESS 8MM

## (undated) DEVICE — CATH IV INTROCAN SAFETY 14G X 1.25" (ORANGE) FEP

## (undated) DEVICE — AESCULAP SCALPFIX 10 CLIPS

## (undated) DEVICE — MARKING PEN W RULER

## (undated) DEVICE — DRSG TELFA 3 X 8

## (undated) DEVICE — XI SEAL UNIVERSIAL 5-12MM

## (undated) DEVICE — XI ARM CLIP APPLIER LARGE

## (undated) DEVICE — STRYKER SONOPET IQ TUBING SET

## (undated) DEVICE — GLV 6.5 PROTEXIS (WHITE)

## (undated) DEVICE — PREP DURAPREP 26CC

## (undated) DEVICE — ELCTR MONOPOLAR STIMULATOR PROBE FLUSH-TIP

## (undated) DEVICE — XI ENDOWRIST SUCTION IRRIGATOR 8MM

## (undated) DEVICE — DRAPE CRANIOTOMY W POUCH 100X104"

## (undated) DEVICE — STRYKER SONOPET IQ TIP 12CM STANDARD

## (undated) DEVICE — DRAIN JACKSON PRATT 3 SPRING RESERVOIR W 7FR PVC DRAIN

## (undated) DEVICE — ELCTR SUBDERMAL NDL CLASSIC 1.5M X 59" (6 COLOR)

## (undated) DEVICE — SPONGE SURGICAL STRIP 1/4 X 6"

## (undated) DEVICE — CLIPPER BLADE NEURO (BLUE)

## (undated) DEVICE — POSITIONER FOAM EGG CRATE ULNAR 2PCS (PINK)

## (undated) DEVICE — GOWN ROYAL SILK XL

## (undated) DEVICE — DRSG KERLIX ROLL 4.5"

## (undated) DEVICE — TUBING FOR SMOKE EVACUATOR (PURPLE END)

## (undated) DEVICE — SPHERE MARKER FOR BRAIN LAB IMAGE (3 SPHERES)

## (undated) DEVICE — ACRA-CUT CRANIAL PERFORATOR ADULT 14MM X 11MM (WHITE)

## (undated) DEVICE — WARMING BLANKET LOWER ADULT

## (undated) DEVICE — XI CORD BIPOLAR CAUTERY (BLUE)

## (undated) DEVICE — SUT VICRYL 3-0 18" SH (POP-OFF)

## (undated) DEVICE — ELCTR 4-DISC 20MM 49" (RED, BLUE, GREEN, BLACK)

## (undated) DEVICE — SOL IRR POUR H2O 1000ML

## (undated) DEVICE — SPONGE SURGICAL STRIP 1" X 6"

## (undated) DEVICE — DRAPE XL SHEET 77X98"

## (undated) DEVICE — PACK NEURO

## (undated) DEVICE — VENODYNE/SCD SLEEVE CALF MEDIUM

## (undated) DEVICE — XI ARM FORCEP PROGRASP 8MM

## (undated) DEVICE — LONE STAR RETRACTOR RING 12MM BLUNT DISP

## (undated) DEVICE — TROCAR APPLIED MEDICAL KII FIOS FIRST ENTRY 5MM X 100MM Z-THREAD

## (undated) DEVICE — DRSG DERMABOND 0.7ML

## (undated) DEVICE — MIDAS REX MR8 TAPERED SM BORE 2.3MM X 7CM FOOTED

## (undated) DEVICE — DRAPE INSTRUMENT POUCH 6.75" X 11"

## (undated) DEVICE — SUT VICRYL 0 18" CT-1 UNDYED (POP-OFF)

## (undated) DEVICE — ELCTR BIPOLAR PROBE

## (undated) DEVICE — DRAPE MICROSCOPE ZEISS OPMI VISIONGUARD 154 X 52"

## (undated) DEVICE — SYR LUER LOK 20CC

## (undated) DEVICE — XI DRAPE ARM

## (undated) DEVICE — TUBING CONNECTING 6MM 20FT

## (undated) DEVICE — SUT NUROLON 4-0 8-18" RB-1 (POP-OFF)

## (undated) DEVICE — SOL IRR POUR NS 0.9% 1000ML

## (undated) DEVICE — DRSG XEROFORM 1 X 8"

## (undated) DEVICE — ELCTR GROUNDING PAD ADULT COVIDIEN

## (undated) DEVICE — GLV 7.5 PROTEXIS (WHITE)

## (undated) DEVICE — XI ARM FORCEP FENESTRATED BIPOLAR 8MM

## (undated) DEVICE — CATH IV SAFE BC 18G X 1.16" (GREEN)

## (undated) DEVICE — TUBING BIPOLAR IRRIGATOR AND CORD SET

## (undated) DEVICE — STAPLER SKIN PROXIMATE

## (undated) DEVICE — ELCTR BOVIE PENCIL SMOKE EVACUATION 15FT

## (undated) DEVICE — GLV 8 PROTEXIS (WHITE)

## (undated) DEVICE — SPONGE SURGICAL STRIP 1/2 X 6"

## (undated) DEVICE — XI ARM PERMANENT CAUTERY HOOK

## (undated) DEVICE — XI DRAPE COLUMN

## (undated) DEVICE — DRSG 4 X 8

## (undated) DEVICE — TAPE SILK 3"

## (undated) DEVICE — ENDOCATCH 10MM

## (undated) DEVICE — PREP BETADINE KIT

## (undated) DEVICE — ELCTR PEDICLE SCREW PROBE 3MM BALL 1.8MM X 100MM

## (undated) DEVICE — ELCTR SUBDERMAL NDL 27G X 1/2" WITH TWISTED PAIR

## (undated) DEVICE — GLV 6.5 PROTEXIS (BLUE)

## (undated) DEVICE — Device

## (undated) DEVICE — SUT VICRYL PLUS 0 27" CT-2 UNDYED

## (undated) DEVICE — DRAPE GENERAL ENDOSCOPY

## (undated) DEVICE — MIDAS REX LEGEND TAPERED SM BORE 2.3MM X 8CM

## (undated) DEVICE — SUT MONOCRYL 4-0 27" PS-2 UNDYED

## (undated) DEVICE — DRAPE TOWEL BLUE STICKY

## (undated) DEVICE — D HELP - CLEARVIEW CLEARIFY SYSTEM

## (undated) DEVICE — APPLICATOR SURGICEL LAP TROCAR POINT 2.5MM X 150MM

## (undated) DEVICE — ENDOCATCH 5MM INZII

## (undated) DEVICE — DRAPE TOWEL BLUE 17" X 24"

## (undated) DEVICE — DRAPE MICROSCOPE ZEISS

## (undated) DEVICE — ELCTR ROCKER SWITCH PENCIL BLUE 10FT

## (undated) DEVICE — PACK ROBOTIC

## (undated) DEVICE — XI CORD MONOPOLAR CAUTERY (GREEN)